# Patient Record
Sex: MALE | Race: WHITE | Employment: PART TIME | URBAN - METROPOLITAN AREA
[De-identification: names, ages, dates, MRNs, and addresses within clinical notes are randomized per-mention and may not be internally consistent; named-entity substitution may affect disease eponyms.]

---

## 2020-05-22 ENCOUNTER — HOSPITAL ENCOUNTER (EMERGENCY)
Age: 20
Discharge: HOME | End: 2020-05-23
Attending: EMERGENCY MEDICINE
Payer: COMMERCIAL

## 2020-05-22 ENCOUNTER — APPOINTMENT (OUTPATIENT)
Dept: GENERAL RADIOLOGY | Age: 20
End: 2020-05-22
Payer: COMMERCIAL

## 2020-05-22 PROCEDURE — 73502 X-RAY EXAM HIP UNI 2-3 VIEWS: CPT

## 2020-05-22 PROCEDURE — 73562 X-RAY EXAM OF KNEE 3: CPT

## 2020-05-22 PROCEDURE — 99283 EMERGENCY DEPT VISIT LOW MDM: CPT

## 2020-05-23 VITALS
RESPIRATION RATE: 16 BRPM | SYSTOLIC BLOOD PRESSURE: 101 MMHG | BODY MASS INDEX: 25.27 KG/M2 | HEART RATE: 80 BPM | DIASTOLIC BLOOD PRESSURE: 52 MMHG | WEIGHT: 180.5 LBS | TEMPERATURE: 98.7 F | HEIGHT: 71 IN | OXYGEN SATURATION: 96 %

## 2020-05-23 RX ORDER — METHOCARBAMOL 500 MG/1
500 TABLET, FILM COATED ORAL 4 TIMES DAILY
Qty: 40 TABLET | Refills: 0 | Status: SHIPPED | OUTPATIENT
Start: 2020-05-23 | End: 2020-06-02

## 2020-05-23 RX ORDER — MELOXICAM 7.5 MG/1
7.5 TABLET ORAL DAILY
Qty: 90 TABLET | Refills: 1 | Status: SHIPPED | OUTPATIENT
Start: 2020-05-23 | End: 2020-10-20 | Stop reason: ALTCHOICE

## 2020-05-23 NOTE — ED PROVIDER NOTES
of intermittent pain there is no obvious fracture or dislocation is no focal neurologic deficits, possible muscle skeletal irritation, if he has persistent symptoms he may require CT or MRI imaging. Outpatient follow-up orthopedics. With regard to his chronic abdominal pain this is been an ongoing issue since the age of 11 he was supposed to get a outpatient colonoscopy he will be followed up outpatient with GI and was given referral numbers. Muscle actions anti-inflammatories and is to return if he is worse or new symptoms    FINAL IMPRESSION    1. Hip pain left   2.   Chronic  abdominal pain       Collette Paschal, MD  00/06/54 7376

## 2020-05-23 NOTE — ED NOTES
Pt c/o left hip and knee pain that he has had for years but pain has been getting worse recently. States it is 8/10 with movement. Also c/o abd pain. Pt denies needs at this time. Pt placed cycling on monitor. VSS, see flowsheets. Dr. Laurel Gutierres at bedside. Will continue to monitor.           Nehemiah Connolly RN  05/22/20 7247

## 2020-05-28 ENCOUNTER — OFFICE VISIT (OUTPATIENT)
Dept: ORTHOPEDIC SURGERY | Age: 20
End: 2020-05-28
Payer: COMMERCIAL

## 2020-05-28 VITALS — BODY MASS INDEX: 25.2 KG/M2 | TEMPERATURE: 98.3 F | WEIGHT: 180 LBS | RESPIRATION RATE: 16 BRPM | HEIGHT: 71 IN

## 2020-05-28 PROBLEM — M54.32 LEFT SIDED SCIATICA: Status: ACTIVE | Noted: 2020-05-28

## 2020-05-28 PROCEDURE — G8427 DOCREV CUR MEDS BY ELIG CLIN: HCPCS | Performed by: ORTHOPAEDIC SURGERY

## 2020-05-28 PROCEDURE — G8419 CALC BMI OUT NRM PARAM NOF/U: HCPCS | Performed by: ORTHOPAEDIC SURGERY

## 2020-05-28 PROCEDURE — 99203 OFFICE O/P NEW LOW 30 MIN: CPT | Performed by: ORTHOPAEDIC SURGERY

## 2020-05-28 PROCEDURE — 4004F PT TOBACCO SCREEN RCVD TLK: CPT | Performed by: ORTHOPAEDIC SURGERY

## 2020-05-28 RX ORDER — METHYLPREDNISOLONE 4 MG/1
TABLET ORAL
Qty: 1 KIT | Refills: 0 | Status: SHIPPED | OUTPATIENT
Start: 2020-05-28 | End: 2020-08-03 | Stop reason: ALTCHOICE

## 2020-05-29 ENCOUNTER — TELEPHONE (OUTPATIENT)
Dept: ORTHOPEDIC SURGERY | Age: 20
End: 2020-05-29

## 2020-05-29 NOTE — TELEPHONE ENCOUNTER
Patient would like to clarify was he being referred to Neurology or Ortho for his spine. Please advise. [phone].  No referral in system to advise

## 2020-06-01 ENCOUNTER — OFFICE VISIT (OUTPATIENT)
Dept: ORTHOPEDIC SURGERY | Age: 20
End: 2020-06-01
Payer: COMMERCIAL

## 2020-06-01 VITALS — RESPIRATION RATE: 16 BRPM | HEIGHT: 71 IN | BODY MASS INDEX: 25.2 KG/M2 | TEMPERATURE: 98.1 F | WEIGHT: 180 LBS

## 2020-06-01 PROCEDURE — 4004F PT TOBACCO SCREEN RCVD TLK: CPT | Performed by: PHYSICIAN ASSISTANT

## 2020-06-01 PROCEDURE — G8427 DOCREV CUR MEDS BY ELIG CLIN: HCPCS | Performed by: PHYSICIAN ASSISTANT

## 2020-06-01 PROCEDURE — G8419 CALC BMI OUT NRM PARAM NOF/U: HCPCS | Performed by: PHYSICIAN ASSISTANT

## 2020-06-01 PROCEDURE — 99213 OFFICE O/P EST LOW 20 MIN: CPT | Performed by: PHYSICIAN ASSISTANT

## 2020-06-08 ENCOUNTER — HOSPITAL ENCOUNTER (OUTPATIENT)
Dept: PHYSICAL THERAPY | Age: 20
Setting detail: THERAPIES SERIES
Discharge: HOME | End: 2020-06-08
Payer: COMMERCIAL

## 2020-06-08 PROCEDURE — 97530 THERAPEUTIC ACTIVITIES: CPT

## 2020-06-08 PROCEDURE — 97110 THERAPEUTIC EXERCISES: CPT

## 2020-06-08 PROCEDURE — 97163 PT EVAL HIGH COMPLEX 45 MIN: CPT

## 2020-06-08 PROCEDURE — G0283 ELEC STIM OTHER THAN WOUND: HCPCS

## 2020-06-08 ASSESSMENT — PAIN SCALES - QUEBEC BACK PAIN DISABILITY SCALE
RIDE IN A CAR: 1
QUEBEC DISABILITY INDEX: 80-99%
WALK A FEW BLOCKS OR 300 TO 400M: 5
PULL OR PUSH HEAVY DOORS: 5
WALK SEVERAL KILOMETERS  OR MILES: 5
RUN ONE BLOCK OR 100M: 5
PUT ON SOCKS OR PANYHOSE: 2
MOVE A CHAIR: 3
TOTAL SCORE: 81
CARRY TWO BAGS OF GROCERIES: 5
REACH UP TO HIGH SHELVES: 5
TAKE FOOD OUT OF THE REFRIGERATOR: 2
SLEEP THROUGH THE NIGHT: 4
GET OUT OF BED: 2
QUEBEC CMS MODIFIER: CM
SIT IN A CHAIR FOR SEVERAL HOURS: 3
THROW A BALL: 5
LIFT AND CARRY A HEAVY SUITCASE: 5
CLIMB ONE FLIGHT OF STAIRS: 4
STAND UP FOR 20 TO 30 MINUTES: 5
TURN OVER IN BED: 5
MAKE YOUR BED: 5
BEND OVER TO CLEAN THE BATHTUB: 5

## 2020-06-08 NOTE — PLAN OF CARE
Outpatient Physical Therapy  [] [facility]    Phone: [phone]   Fax: [phone]   [x] [facility]  Phone: [phone]              Fax: [phone]  [] [facility]   Phone: [phone]   Fax: [phone]     To: Referring Practitioner: Briana Hutton      Patient: Jesu Schaefer   : 2000   MRN: [id number]  Evaluation Date: 2020      Diagnosis Information:  · Diagnosis: Left sided sciatica (M54.32 ICD-10-CM)   · Treatment Diagnosis: Lumbar sacral hypomobility/ mal alignment consistant with radiculopathy     Physical Therapy Certification/Re-Certification Form  Dear Briana Hutton  The following patient has been evaluated for physical therapy services and for therapy to continue, Medicare requires monthly physician review of the treatment plan. Please review the attached evaluation and/or summary of the patient's plan of care, and verify that you agree therapy should continue by signing the attached document and sending it back to our office. Plan of Care/Treatment to date:  [x] Therapeutic Exercise    [x] Modalities:  [x] Therapeutic Activity     [x] Ultrasound  [x] Electrical Stimulation  [] Gait Training      [] Cervical Traction [x] Lumbar Traction  [x] Neuromuscular Re-education    [x] Cold/hotpack [] Iontophoresis   [x] Instruction in HEP     Other:  [x] Manual Therapy      []             [] Aquatic Therapy      []           ? Frequency/Duration:  # Days per week: [] 1 day # Weeks: [] 1 week [] 5 weeks     [x] 2 days? [] 2 weeks [x] 6 weeks     [] 3 days   [] 3 weeks [] 7 weeks     [] 4 days   [] 4 weeks [x] 8 weeks    Rehab Potential: [] Excellent [x] Good [x] Fair  [] Poor       Electronically signed by:  Yoana Lemus PT      If you have any questions or concerns, please don't hesitate to call.   Thank you for your referral.      Physician Signature:________________________________Date:__________________  By signing above, therapists plan is approved by physician

## 2020-06-08 NOTE — PROGRESS NOTES
Physical Therapy  Initial Assessment  Date: 2020  Patient Name: Raquel Danielson  MRN: [id number]  : 2000     Treatment Diagnosis: Lumbar sacral hypomobility/ mal alignment consistent with radiculopathy    Restrictions  Position Activity Restriction  Other position/activity restrictions: not a fall risk    Subjective   General  Chart Reviewed:  Yes  Additional Pertinent Hx: h/o back pain  Referring Practitioner: Griselda Ink  Referral Date : 20  Diagnosis: Left sided sciatica (M54.32 ICD-10-CM)  General Comment  Comments: Pt fell at home while self medicating, landing on knees and then fell back, 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, activities include LARP, combat training  PT Visit Information  Onset Date: 20  PT Insurance Information: [employer] 34 PLAN  Subjective  Subjective: c/o constant LBP 3-9/10 that radiates from back to ankle but also has parethesia in his left foot, increased pain with standing, transfering from sit to stand, general movement, decreased pain with lying on right side, sleep is disturbed by pain, pain has gotten worse from onset,        Vision/Hearing       Orientation  Orientation  Overall Orientation Status: Within Normal Limits    Social/Functional History       Objective     Observation/Palpation  Posture: Poor  Palpation: TTP at Bilat dorssal LS pvm's Left > Right   Observation:  flexed at lumbar and laterally shifted to right   Body Mechanics: Gait is antalgic decreased step length, pace and slight decrease in LLE stance phase    AROM RLE (degrees)  RLE AROM: WFL  AROM LLE (degrees)  LLE AROM : WFL  Spine  Lumbar: Flexion 15-55* (* increased LS pain), Ext *-15 from neutral, lateral flexion Left 0-10* and right 0-15, rotation Left and right each WFL's    Strength RLE  Strength RLE: WFL  Strength LLE  L Hip Flexion: 4+/5  L Knee Flexion: 4+/5  L Knee Extension: 4+/5  L Ankle Dorsiflexion: 4/5  L Ankle

## 2020-06-08 NOTE — FLOWSHEET NOTE
lumbar support 6/8/20   standing     lifting               Therapeutic Activities; 6/8/20 Neutral Spine (NS) Instruction. The patient demonstrated good tolerance, technique  and verbalized understanding with and of NS instruction respectively. [facility] spine posture hand out issued. Manual Treatments:   Right lateral shift correction (tolerated well)  Left Sciatic N. Glides clementina fair to well   Man. PTx clementina. Poor (LLE symptoms)    Modalities:   IFC prone on mob table 15 min    Progression Towards Functional goals:  [] Patient is progressing as expected towards functional goals listed. [] Progression is slowed due to complexities listed. [] Progression has been slowed due to co-morbidities. [x] Plan just implemented, too soon to assess goals progression  [] Other:    Charges: Therapeutic Exercise:  [] (41334) Provided verbal/tactile cueing for activities to restore or maintain strength, flexibility, endurance, ROM for improvements with self-care, mobility, lifting and ambulation. Neuromuscular Re-Education  [] (72626) Provided verbal/tactile cueing for activities to restore or maintain balance, coordination, kinesthetic sense, posture, motor skill, proprioception for self-care, mobility, lifting, and ambulation. Therapeutic Activities:    [] (56018) Provided verbal/tactile cueing to address functional limitations related to loss of mobility, strength, balance, and coordination.      Gait Training:  [] (91245) Provided training and instruction to the patient for proper postural muscle recruitment and positioning with ambulation re-education     Home Exercise Program:    [] (33028) Reviewed/Progressed HEP activities related to strengthening, flexibility, endurance, ROM for functional self-care, mobility, lifting and ambulation   [] (26003) Reviewed/Progressed HEP activities related to improving balance, coordination, kinesthetic sense, posture, motor skill, proprioception for self-care, mobility, lifting, and ambulation      Manual Treatments:  MFR / STM / Oscillations-Mobs:  G-I, II, III, IV / Manipulation / MLD  [] (78460) Provided manual therapy to mobilize  soft tissue/joints/fluid for the purpose of modulating pain, promoting relaxation, increasing ROM, reducing/eliminating soft tissue swelling/inflammation/restriction, improving soft tissue extensibility and allowing for proper ROM for normal function with self- care, mobility, lifting and ambulation. Timed Code Treatment Minutes: 25   Total Treatment Minutes: 70     [] EVAL (LOW) 32771   [] EVAL (MOD) 96844   [x] EVAL (HIGH) 20048   [] RE-EVAL   [x] TE (18010) x     [] Aquatic (57464) x  [] NMR (48270)   x  [] Aquatic Group (84349) x  [] Manual (57704) x    [] Ultrasound (12388) x  [x] TA (92201) x  [] Mech Traction (24369)  [] Ionto (78377)           [x] ES (un) (08223):   [] Vasopump (81902) [] Other:      Assessment  [] Patient tolerated treatment well [] Patient limited by fatigue  [] Patient limited by pain  [] Patient limited by other medical complications  [x] Other: Javier.  Rx fair to well on mob table with flexion    Prognosis: [x] Good [x] Fair  [] Poor    Goals:    Short term goals  Time Frame for Short term goals: 2-3 weeks  Short term goal 1: Pt will stand upright without increased c/o pain to facilitate pt's goal      Long term goals  Time Frame for Long term goals : 6-8 weeks  Long term goal 1: All TLS spine ranges wnl's without increased c/o pain  Long term goal 2: Decrease c/o pain at LLE and Back 80% or better with standing, walking and transfers  Long term goal 3: improve LLE strength  1 step of a MMT grade to facilitate greater ease of mobility     Patient Requires Follow-up: [x] Yes  [] No    Plan:   [x] Continue per plan of care [] Alter current plan (see comments)  [] Plan of care initiated [] Hold pending MD visit [] Discharge    Plan for Next Session:  E stim on flex mob table, consider US,  postural correction, core

## 2020-06-18 ENCOUNTER — HOSPITAL ENCOUNTER (OUTPATIENT)
Dept: PHYSICAL THERAPY | Age: 20
Setting detail: THERAPIES SERIES
Discharge: HOME | End: 2020-06-18
Payer: COMMERCIAL

## 2020-06-18 PROCEDURE — G0283 ELEC STIM OTHER THAN WOUND: HCPCS

## 2020-06-18 PROCEDURE — 97035 APP MDLTY 1+ULTRASOUND EA 15: CPT

## 2020-06-18 PROCEDURE — 97110 THERAPEUTIC EXERCISES: CPT

## 2020-06-18 NOTE — FLOWSHEET NOTE
rotation L/R 20 x ea    Piriformis stretch 30\" x 4 L/R ea    SKTC 30\" 4 x L/R ea              Neutral Spine (NS) Activities     Bed mobility Seated <> SL'ing and log rolling 6/8/20   Seated  Pillow lumbar support 6/8/20   standing     lifting               Therapeutic Activities; 6/8/20 Neutral Spine (NS) Instruction. The patient demonstrated good tolerance, technique  and verbalized understanding with and of NS instruction respectively. [employer] spine posture hand out issued. 6/18/20 Reviewed NS trunk rolling R anD SL'ing <> sit. Pt demonstrated good tolerance and tech. Access Code: [email or web address]   URL: [email or web address]   Date: 06/18/2020   Prepared by: Neena Feliciano     Exercises   Supine Posterior Pelvic Tilt - 15 reps - 1-2 sets - 5 hold - 2-3x daily - 7x weekly   Hooklying Single Knee to Chest Stretch - 4 reps - 1 sets - 30 hold - 2-3x daily - 7x weekly   Sidelying Lumbar Rotation - 20 reps - 1-3 sets - 1-2 hold - 2-3x daily - 7x weekly   Supine Piriformis Stretch with Foot on Ground - 2-4 reps - 1 sets - 30 hold - 2-3x daily - 7x weekly     The patient demonstrated good tolerance to and understanding of the HEP. Written instructions have been issued. Manual Treatments:   Right lateral shift correction (tolerated well)  Left Sciatic N. Glides clementina fair to well   Man. PTx clementina. Poor (LLE symptoms)    Modalities:   IFC prone on mob table 15 min    Progression Towards Functional goals:  [] Patient is progressing as expected towards functional goals listed. [] Progression is slowed due to complexities listed. [] Progression has been slowed due to co-morbidities. [x] Plan just implemented, too soon to assess goals progression  [] Other:    Charges: Therapeutic Exercise:  [] (42473) Provided verbal/tactile cueing for activities to restore or maintain strength, flexibility, endurance, ROM for improvements with self-care, mobility, lifting and ambulation.     Neuromuscular Re-Education  [] (70595) Provided verbal/tactile cueing for activities to restore or maintain balance, coordination, kinesthetic sense, posture, motor skill, proprioception for self-care, mobility, lifting, and ambulation. Therapeutic Activities:    [] (67342) Provided verbal/tactile cueing to address functional limitations related to loss of mobility, strength, balance, and coordination. Gait Training:  [] (08092) Provided training and instruction to the patient for proper postural muscle recruitment and positioning with ambulation re-education     Home Exercise Program:    [] (09862) Reviewed/Progressed HEP activities related to strengthening, flexibility, endurance, ROM for functional self-care, mobility, lifting and ambulation   [] (74608) Reviewed/Progressed HEP activities related to improving balance, coordination, kinesthetic sense, posture, motor skill, proprioception for self-care, mobility, lifting, and ambulation      Manual Treatments:  MFR / STM / Oscillations-Mobs:  G-I, II, III, IV / Manipulation / MLD  [] (14028) Provided manual therapy to mobilize  soft tissue/joints/fluid for the purpose of modulating pain, promoting relaxation, increasing ROM, reducing/eliminating soft tissue swelling/inflammation/restriction, improving soft tissue extensibility and allowing for proper ROM for normal function with self- care, mobility, lifting and ambulation.         Timed Code Treatment Minutes: 45   Total Treatment Minutes: 65     [] EVAL (LOW) 43146   [] EVAL (MOD) 03706   [] EVAL (HIGH) 90682   [] RE-EVAL   [x] TE (83577) x2     [] Aquatic (72647) x  [] NMR (85802)   x  [] Aquatic Group (21071) x  [] Manual (16014) x    [x] Ultrasound (01099) x  [] TA (17763) x  [] Mech Traction (41780)  [] Ionto (94258)           [x] ES (un) (09463):   [] Vasopump (85604) [] Other:      Assessment  [] Patient tolerated treatment well [] Patient limited by fatigue  [] Patient limited by pain  [] Patient limited by other medical complications  [x] Other: Javier.  Rx fair to well on mob table with flexion    Prognosis: [x] Good [x] Fair  [] Poor    Goals:    Short term goals  Time Frame for Short term goals: 2-3 weeks  Short term goal 1: Pt will stand upright without increased c/o pain to facilitate pt's goal      Long term goals  Time Frame for Long term goals : 6-8 weeks  Long term goal 1: All TLS spine ranges wnl's without increased c/o pain  Long term goal 2: Decrease c/o pain at LLE and Back 80% or better with standing, walking and transfers  Long term goal 3: improve LLE strength  1 step of a MMT grade to facilitate greater ease of mobility     Patient Requires Follow-up: [x] Yes  [] No    Plan:   [x] Continue per plan of care [] Alter current plan (see comments)  [] Plan of care initiated [] Hold pending MD visit [] Discharge    Plan for Next Session:    E stim on flex mob table, consider US,  postural correction, core stabilization progress with Stevie Cranker ex if tolerated otherwise ( Lamine flexion)    Electronically signed by:  Rayshawn Javed, PT

## 2020-06-22 ENCOUNTER — HOSPITAL ENCOUNTER (OUTPATIENT)
Dept: PHYSICAL THERAPY | Age: 20
Setting detail: THERAPIES SERIES
Discharge: HOME | End: 2020-06-22
Payer: COMMERCIAL

## 2020-06-22 PROCEDURE — G0283 ELEC STIM OTHER THAN WOUND: HCPCS

## 2020-06-22 PROCEDURE — 97140 MANUAL THERAPY 1/> REGIONS: CPT

## 2020-06-22 PROCEDURE — 97110 THERAPEUTIC EXERCISES: CPT

## 2020-06-22 PROCEDURE — 97035 APP MDLTY 1+ULTRASOUND EA 15: CPT

## 2020-06-22 NOTE — FLOWSHEET NOTE
gluteal sets 5\" x 10 Clementina. Fair to poor   LTR mid rom        Piriformis stretch 30\" x 4 L/R ea    SKTC 30\" 4 x L/R ea              Neutral Spine (NS) Activities     Bed mobility Seated <> SL'ing and log rolling 6/8/20   Seated  Pillow lumbar support 6/8/20   standing     lifting               Therapeutic Activities; 6/8/20 Neutral Spine (NS) Instruction. The patient demonstrated good tolerance, technique  and verbalized understanding with and of NS instruction respectively. [facility] spine posture hand out issued. 6/18/20 Reviewed NS trunk rolling R anD SL'ing <> sit. Pt demonstrated good tolerance and tech. Access Code: [email or web address]   URL: [email or web address]   Date: 06/18/2020   Prepared by: Vic Coughlin     Exercises   Supine Posterior Pelvic Tilt - 15 reps - 1-2 sets - 5 hold - 2-3x daily - 7x weekly   Hooklying Single Knee to Chest Stretch - 4 reps - 1 sets - 30 hold - 2-3x daily - 7x weekly   Sidelying Lumbar Rotation - 20 reps - 1-3 sets - 1-2 hold - 2-3x daily - 7x weekly   Supine Piriformis Stretch with Foot on Ground - 2-4 reps - 1 sets - 30 hold - 2-3x daily - 7x weekly     The patient demonstrated good tolerance to and understanding of the HEP. Written instructions have been issued. Manual Treatments:   Right lateral shift correction (tolerated well)  Left Sciatic N. Glides clementina fair to well   Man. PTx clementina. Poor (LLE symptoms)  Right upslip gentle right leg pull, mfr to right quadratus which caused pain in left lb, (he has a left on right post sacral torsion but did not want to put him in position of correction )    Modalities:   ifc and hp supine with bolster x 15 min, us 1.5 w/cm2 to left L45S1 area x 10 min    Progression Towards Functional goals:  [] Patient is progressing as expected towards functional goals listed. [] Progression is slowed due to complexities listed. [] Progression has been slowed due to co-morbidities.   [x] Plan just implemented, too soon to assess goals

## 2020-06-29 ENCOUNTER — HOSPITAL ENCOUNTER (OUTPATIENT)
Dept: PHYSICAL THERAPY | Age: 20
Setting detail: THERAPIES SERIES
Discharge: HOME | End: 2020-06-29
Payer: COMMERCIAL

## 2020-06-29 PROCEDURE — 97035 APP MDLTY 1+ULTRASOUND EA 15: CPT

## 2020-06-29 PROCEDURE — G0283 ELEC STIM OTHER THAN WOUND: HCPCS

## 2020-06-29 PROCEDURE — 97140 MANUAL THERAPY 1/> REGIONS: CPT

## 2020-06-29 ASSESSMENT — PAIN SCALES - QUEBEC BACK PAIN DISABILITY SCALE
THROW A BALL: 5
TOTAL SCORE: 81
MAKE YOUR BED: 5
WALK A FEW BLOCKS OR 300 TO 400M: 5
BEND OVER TO CLEAN THE BATHTUB: 5
SLEEP THROUGH THE NIGHT: 4
PUT ON SOCKS OR PANYHOSE: 2
STAND UP FOR 20 TO 30 MINUTES: 5
QUEBEC CMS MODIFIER: CM
TAKE FOOD OUT OF THE REFRIGERATOR: 2
GET OUT OF BED: 2
RIDE IN A CAR: 1
QUEBEC DISABILITY INDEX: 80-99%
SIT IN A CHAIR FOR SEVERAL HOURS: 3
CARRY TWO BAGS OF GROCERIES: 5
REACH UP TO HIGH SHELVES: 5
TURN OVER IN BED: 5
RUN ONE BLOCK OR 100M: 5
CLIMB ONE FLIGHT OF STAIRS: 4
PULL OR PUSH HEAVY DOORS: 5
LIFT AND CARRY A HEAVY SUITCASE: 5
WALK SEVERAL KILOMETERS  OR MILES: 5
MOVE A CHAIR: 3

## 2020-06-29 NOTE — PROGRESS NOTES
should continue to improve in reasonable time if they continue HEP              []Patient has plateaued and is no longer responding to skilled PT intervention                        [x]Patient is getting worse and would benefit from return to referring MD              []Patient unable to adhere to initial POC              []Other:     Goals: Short term goals  Time Frame for Short term goals: 2-3 weeks  Short term goal 1: Pt will stand upright without increased c/o pain to facilitate pt's goal      Long term goals  Time Frame for Long term goals : 6-8 weeks  Long term goal 1: All TLS spine ranges wnl's without increased c/o pain  Long term goal 2: Decrease c/o pain at LLE and Back 80% or better with standing, walking and transfers  Long term goal 3: improve LLE strength  1 step of a MMT grade to facilitate greater ease of mobility     Goal Status:  [] Achieved [] Partially Achieved  [x] Not Achieved     Reason for Discharge:  [] Goals Met   [] Patient did not return after initial evaluation   [] Progress Plateaued [] Missed _____ scheduled appointments   [] No insurance coverage [x] Patient terminated therapy   [] Other:        PT recommendation:   [x] Patient now discharged with HEP      [] Other:    Discharge discussed with:  [x] Patient  [] Caregiver       [] Other        Electronically signed by:  Praneeth Gaspar PT    If you have any questions or concerns, please don't hesitate to call.   Thank you for your referral.

## 2020-06-29 NOTE — FLOWSHEET NOTE
hp supine with bolster x 15 min, us 1.5 w/cm2 to left L45S1 area x 8 min    Progression Towards Functional goals:  [] Patient is progressing as expected towards functional goals listed. [] Progression is slowed due to complexities listed. [] Progression has been slowed due to co-morbidities. [x] Plan just implemented, too soon to assess goals progression  [] Other:    Charges: Therapeutic Exercise:  [] (24127) Provided verbal/tactile cueing for activities to restore or maintain strength, flexibility, endurance, ROM for improvements with self-care, mobility, lifting and ambulation. Neuromuscular Re-Education  [] (93206) Provided verbal/tactile cueing for activities to restore or maintain balance, coordination, kinesthetic sense, posture, motor skill, proprioception for self-care, mobility, lifting, and ambulation. Therapeutic Activities:    [] (39779) Provided verbal/tactile cueing to address functional limitations related to loss of mobility, strength, balance, and coordination.      Gait Training:  [] (84142) Provided training and instruction to the patient for proper postural muscle recruitment and positioning with ambulation re-education     Home Exercise Program:    [] (59962) Reviewed/Progressed HEP activities related to strengthening, flexibility, endurance, ROM for functional self-care, mobility, lifting and ambulation   [] (85850) Reviewed/Progressed HEP activities related to improving balance, coordination, kinesthetic sense, posture, motor skill, proprioception for self-care, mobility, lifting, and ambulation      Manual Treatments:  MFR / STM / Oscillations-Mobs:  G-I, II, III, IV / Manipulation / MLD  [] (78095) Provided manual therapy to mobilize  soft tissue/joints/fluid for the purpose of modulating pain, promoting relaxation, increasing ROM, reducing/eliminating soft tissue swelling/inflammation/restriction, improving soft tissue extensibility and allowing for proper ROM for normal function with self- care, mobility, lifting and ambulation. Timed Code Treatment Minutes: 45   Total Treatment Minutes: 65     [] EVAL (LOW) 94215   [] EVAL (MOD) 37589   [] EVAL (HIGH) 02804   [] RE-EVAL   [] TE (85604) x     [] Aquatic (98432) x  [] NMR (97988)   x  [] Aquatic Group (72631) x  [x] Manual (17719) x 2  [x] Ultrasound (94057) x  [] TA (77034) x  [] Mech Traction (50147)  [] Ionto (91385)           [x] ES (un) (04626):   [] Vasopump (33228) [] Other:      Assessment  [] Patient tolerated treatment well [] Patient limited by fatigue  [] Patient limited by pain  [] Patient limited by other medical complications  [x] Other: Javier.  Rx fair to well on mob table with flexion    Prognosis: [x] Good [x] Fair  [] Poor    Goals:    Short term goals  Time Frame for Short term goals: 2-3 weeks  Short term goal 1: Pt will stand upright without increased c/o pain to facilitate pt's goal      Long term goals  Time Frame for Long term goals : 6-8 weeks  Long term goal 1: All TLS spine ranges wnl's without increased c/o pain  Long term goal 2: Decrease c/o pain at LLE and Back 80% or better with standing, walking and transfers  Long term goal 3: improve LLE strength  1 step of a MMT grade to facilitate greater ease of mobility     Patient Requires Follow-up: [x] Yes  [x] No    Plan:   [] Continue per plan of care [] Alter current plan (see comments)  [] Plan of care initiated [] Hold pending MD visit [x] Discharge    Plan for Next Session:        Electronically signed by:  Samira Noel PT

## 2020-06-30 ENCOUNTER — TELEPHONE (OUTPATIENT)
Dept: ORTHOPEDIC SURGERY | Age: 20
End: 2020-06-30

## 2020-06-30 NOTE — TELEPHONE ENCOUNTER
----- Message from Isaac Lozada PA-C sent at 6/30/2020  8:58 AM EDT -----  Received note from PT that his pain is worsening and unable to complete treatment. I would suggest calling patient to order lumbar MRI. Thanks.

## 2020-06-30 NOTE — TELEPHONE ENCOUNTER
Attempted to call patient to see about getting an Mri ordered. However number was not accepting calls at this time. I will try again later.

## 2020-07-01 NOTE — TELEPHONE ENCOUNTER
Attempted to call patient to see about getting an MRI ordered. However, patients number is not available for incoming calls.  Will try again later

## 2020-07-02 NOTE — TELEPHONE ENCOUNTER
Attempted to call patient find out where he wanted the MRI to be placed at. However the primary number isnt in service and the secondary number is incorrect. Patient is scheduled for a follow up visit with Everette Hawkins on 7.8.2020. I will try him again next week before his appt. If not then this can be discussed and placed then.

## 2020-07-08 ENCOUNTER — OFFICE VISIT (OUTPATIENT)
Dept: ORTHOPEDIC SURGERY | Age: 20
End: 2020-07-08
Payer: COMMERCIAL

## 2020-07-08 VITALS — HEIGHT: 71 IN | BODY MASS INDEX: 25.2 KG/M2 | WEIGHT: 180 LBS | TEMPERATURE: 98.4 F

## 2020-07-08 PROCEDURE — G8419 CALC BMI OUT NRM PARAM NOF/U: HCPCS | Performed by: PHYSICIAN ASSISTANT

## 2020-07-08 PROCEDURE — 99213 OFFICE O/P EST LOW 20 MIN: CPT | Performed by: PHYSICIAN ASSISTANT

## 2020-07-08 PROCEDURE — 4004F PT TOBACCO SCREEN RCVD TLK: CPT | Performed by: PHYSICIAN ASSISTANT

## 2020-07-08 PROCEDURE — G8427 DOCREV CUR MEDS BY ELIG CLIN: HCPCS | Performed by: PHYSICIAN ASSISTANT

## 2020-07-08 RX ORDER — CYCLOBENZAPRINE HCL 5 MG
5 TABLET ORAL 3 TIMES DAILY PRN
Qty: 30 TABLET | Refills: 0 | Status: SHIPPED | OUTPATIENT
Start: 2020-07-08 | End: 2020-07-18

## 2020-07-08 NOTE — PROGRESS NOTES
History of present illness:   Mr. Greg Sheppard  is a pleasant 23 y.o. male with a PMH of anxiety returning to the office regarding his LBP and left leg pain. He states his pain began after a fall about 2 months ago. Patient went to [facility] ED on 5/22/20. His pain has increased since last office visit. He rates his pain 9/10 today. He describes the pain as shooting, sharp, and constant that is worse with any movement and better with rest. The leg pain radiates down the lateral aspect of his left leg to his foot. He admits numbness and tingling in his left leg. He denies progressive weakness of his left leg and denies bowel or bladder dysfunction. His left hip was evaluated by Dr. Tejas Jorgensen and found no significant arthritis or hip pathology. He has tried medrol dose pack with some relief. Has been doing PT which initially helped but now feels his pain is worse. Physical examination:  Mr. Indio Cordoba most recent vitals: There is no height or weight on file to calculate BMI. General exam:  He is well-developed and well-nourished, is in obvious pain and alert and oriented to person, place, and time. He demonstrates appropriate mood and affect. His skin is warm and dry. His gait is normal and he walks heel to toe without limp or instability. Back:  He stands and walks with moderate lumbar flexion. His lumbar flexion, extension and lateral bending are moderately reduced with pain. He has moderate tenderness over his lumbar spine without obvious muscle spasm. Tenderness over left lumbar paraspinal muscles. The skin over his lumbar spine is normal without a surgical scar. Lower extremities:  He has 5/5 motor strength of bilateral lower extremities. He has a negative straight leg raise, bilaterally. Deep tendon reflexes at knees and achilles are 2+. Sensation is intact to light touch L3 to S1 bilaterally. He has no clonus. Hip range of motion painless.     Imaging:  I reviewed AP and lateral xray images of his lumbar spine from last office visit. Patient is in forward flexed position during xrays. No evidence of fracture or instability. Spondylosis most notable L4-5 and L5-S1    Assessment:  Lumbar spondylosis with radiculopathy    Plan:  We discussed treatment options including observation, additional oral steroids, physical therapy, epidural injection and additional imaging. He has not improved with outpatient PT therefore we will order lumbar MRI without contrast. We will call him with results. May try GILMA pending results.     Judy Rojas PA-C

## 2020-07-10 ENCOUNTER — TELEPHONE (OUTPATIENT)
Dept: ORTHOPEDIC SURGERY | Age: 20
End: 2020-07-10

## 2020-07-14 ENCOUNTER — HOSPITAL ENCOUNTER (OUTPATIENT)
Dept: MRI IMAGING | Age: 20
Discharge: HOME | End: 2020-07-14
Payer: COMMERCIAL

## 2020-07-14 PROCEDURE — 72148 MRI LUMBAR SPINE W/O DYE: CPT

## 2020-07-16 ENCOUNTER — TELEPHONE (OUTPATIENT)
Dept: ORTHOPEDIC SURGERY | Age: 20
End: 2020-07-16

## 2020-07-16 NOTE — TELEPHONE ENCOUNTER
Spoke with patient and went over his MRI results. Patient would like to proceed with an GILMA. A referral was placed to Dr. Carol Smith as he wanted someone who went to the [facility] side [facility].

## 2020-07-16 NOTE — TELEPHONE ENCOUNTER
----- Message from Marshal Hernandez PA-C sent at 7/15/2020 12:32 PM EDT -----  Please call patient with lumbar MRI results. They show left sided disc protrusion at L5-S1 which would explain his symptoms. He is welcome to try GILMA as discussed in office. Thanks.

## 2020-07-20 ENCOUNTER — TELEPHONE (OUTPATIENT)
Dept: ORTHOPEDIC SURGERY | Age: 20
End: 2020-07-20

## 2020-07-20 NOTE — TELEPHONE ENCOUNTER
Patient was referred to Dr. Manda Hammond for lumbar  MRI yes  PT reported in referring physician office note  MEDICAL HX negative  Patient is  approved to be seen at this time. Message sent to schedulers    Please notify referring physician if denied.

## 2020-08-03 ENCOUNTER — OFFICE VISIT (OUTPATIENT)
Dept: ORTHOPEDIC SURGERY | Age: 20
End: 2020-08-03
Payer: COMMERCIAL

## 2020-08-03 VITALS — RESPIRATION RATE: 12 BRPM | HEIGHT: 71 IN | BODY MASS INDEX: 25.19 KG/M2 | TEMPERATURE: 98.2 F | WEIGHT: 179.9 LBS

## 2020-08-03 PROCEDURE — 99244 OFF/OP CNSLTJ NEW/EST MOD 40: CPT | Performed by: PHYSICIAN ASSISTANT

## 2020-08-03 PROCEDURE — G8427 DOCREV CUR MEDS BY ELIG CLIN: HCPCS | Performed by: PHYSICIAN ASSISTANT

## 2020-08-03 PROCEDURE — G8419 CALC BMI OUT NRM PARAM NOF/U: HCPCS | Performed by: PHYSICIAN ASSISTANT

## 2020-08-03 NOTE — LETTER
Please schedule the following with:     Date:   20 @ 8:30    Account: [id number]  Patient: Beverley Alexander    : 2000  Address:  [address]    Phone (F):  [phone] (home)      ----------------------------------------------------------------------------------------------  Diagnosis:     ICD-10-CM    1. Lumbar radiculopathy  M54.16    2. HNP (herniated nucleus pulposus), lumbar  M51.26    3. DDD (degenerative disc disease), lumbar  M51.36      Procedure: Transforaminal epidural steroid injection     Levels:L5 and S1  Side: Left  CPT Codes 14485, 20366    ----------------------------------------------------------------------------------------------  Injection # 1  [facility]    Attending Physician       James Cordoba.  Anne Marie Archer MD.    ----------------------------------------------------------------------------------------------  Injection Scheduled For:    At:    1st Insurance [employer]  Pre-Cert#    2nd Insurance     Pre-Cert#    Comments:    · Infection control  · Tested positive for MRSA in past 12 months:  no  · Tested positive for MSSA \"staph infection\" in past 12 months: no  · Tested positive for VRE (Vancomycin Resistant Enterococci) in past 12 months:   no  · Currently on any antibiotics for an infection: no  · Anticoagulants:  · On a blood thinner:  no   · Any history of bleeding disorder: no   · Advanced Liver disease: no   · Advanced Renal disease: no   · Glaucoma: no   · Diabetes: no     Sedation:  Yes  -----------------------------------------------------------------------------------------------  No Known Allergies

## 2020-08-03 NOTE — PROGRESS NOTES
New Patient: SPINE    Referring Provider:  Elana Aguilar PA-C    Chief Complaint   Patient presents with    Lower Back Pain     NP LSP     Leg Pain     NP L LEG       HISTORY OF PRESENT ILLNESS:      · The patient is being sent at the request of Elana Aguilar PA-C in consultation as a new spine patient for low back pain and left leg pain. The patient is a 23 y.o. male whom reports symptoms for 3 months. Symptoms progressed over the last  3 months. Patient reports there was not a significant event to cause the symptoms, however he did have a fall in May that could have started these symptoms. Today discomfort is report at 8 out of 10, describing it as numbness, stabbing. Symptoms are aggravated by: changing positions. Patient has undergone recent treatment including, oral medications, activity modifications, physical therapy, heat, ice, TENS unit. Patient denies previous lumbar spine surgery. · Mr. Everardo Huerta presents today for low back pain with radicular symptoms in the entire left leg, into the foot. He describes his pain as constant in nature. He sustained a fall a few months ago, however this is not when his pain started. The symptoms have been getting progressively worse. There is more stabbing pain in the low back and numbness, shooting, electric pain in the left leg. His low back pain is worse with extension. The pain occasionally wakes him up at night. · Flexeril and Ibuprofen help him the most. Use of a TENS unit also helps, however he feels a significant plateau with his pain after attending physical therapy. He notes weakness and instability in his left leg. He denies falling due to this symptom. Patient does not present today with any ambulatory aids or bracing.      Pain Assessment  Location of Pain: Back(LSP)  Location Modifiers: Left(LEG)  Severity of Pain: 8  Quality of Pain: (NUMBNESS, STABBING)  Duration of Pain: Persistent  Frequency of Pain: Constant  Date Pain First Started: (3 cigarettes. He has been smoking about 0.50 packs per day. His smokeless tobacco use includes chew. He reports current alcohol use. He reports previous drug use. Family History:   Family History   Problem Relation Age of Onset    No Known Problems Mother     No Known Problems Father          REVIEW OF SYSTEMS: ROS - 14 point    Constitutional: No fevers, chills, night sweats, unexplained weight loss  Eye: No vision changes or diplopia  ENT: No nasal congestion, postnasal drip or sore throat. No tinnitus  Respiratory: No cough or SOB  CV: No chest pain or palpitations  GI: No nausea, abdominal pain, stool changes  : No dysuria or hematuria  Skin: No new or changing skin lesions, no rashes  MSK: No joint swelling, morning stiffness, unusual joint pain, + low back and left leg pain  Neurological: No headache, confusion, syncope  Psychiatric: No excessive anxiety or depression  Endocrine: No polyuria or polydipsia  Hematologic: No lymph node enlargement or excessive bleeding  Immunologic:No history of immune deficiency or immunomodulating drugs           PHYSICAL EXAM:    Vitals: Temperature 98.2 °F (36.8 °C), resp. rate 12, height 5' 10.98\" (1.803 m), weight 179 lb 14.3 oz (81.6 kg). GENERAL EXAM:  · General Apparence: Patient is adequately groomed with no evidence of malnutrition. · Psychiatric: Orientation: The patient is oriented to time, place and person. The patient's mood and affect are appropriate   · Vascular: Examination reveals no swelling and palpation reveals no tenderness in upper or lower extremities. Good capillary refill. · The lymphatic examination of the neck, axillae and groin reveals all areas to be without enlargement or induration   Sensation is intact without deficit in the upper  extremities to light touch and pinprick. Sensation is decreased to light touch and pinprick in the left lower extremity compared to right.    · Coordination of the upper and lower extremities are normal.    CERVICAL EXAMINATION:  · Inspection: Local inspection shows no step-off or bruising. Cervical alignment is normal. No instability is noted. · Palpation and Percussion: No evidence of tenderness at the midline. Paraspinal tenderness is not present. There is no paraspinal spasm. · Range of Motion:  pain-free ROM   · Strength: 5/5 bilateral upper extremities  · Special Tests:   Spurling's and Jameson's are negative bilaterally. Kiran and Impingement tests are negative bilaterally. · Skin:There are no rashes, ulcerations or lesions. · Reflexes: Bilaterally triceps, biceps and brachioradialis are 2+. Clonus absent bilaterally at the feet. No pathological reflexes are noted. · Gait & station:  antalgic on the left and no ataxia  · Additional Examinations:  · RIGHT UPPER EXTREMITY:  Inspection/examination of the right upper extremity does not show any tenderness, deformity or injury. Range of motion is normal and pain-free. There is no gross instability. There are no rashes, ulcerations or lesions. Strength and tone are normal. No atrophy or abnormal movements are noted. · LEFT UPPER EXTREMITY: Inspection/examination of the left upper extremity does not show any tenderness, deformity or injury. Range of motion is normal and pain-free. There is no gross instability. There are no rashes, ulcerations or lesions. Strength and tone are normal. No atrophy or abnormal movements are noted. LUMBAR/SACRAL EXAMINATION:  · Inspection: Local inspection shows no step-off or bruising. Lumbar alignment is normal. No instability is noted. · Palpation:   No evidence of tenderness at the midline. Lumbar paraspinal tenderness Mild L4/5 and L5/S1 tenderness  Bursal tenderness No tenderness bilaterally  There is no paraspinal spasm. · Range of Motion: very limited due to pain  · Strength:   Strength testing is 5/5 in all muscle groups tested.   · Special Tests:   Straight leg raise positive on the left, negative on the right and crossed SLR positive on the right. Gordon's testing is negative bilaterally. FADIR's testing is negative bilaterally. Slump test negative. Bowstring test negative  · Skin: There are no rashes, ulcerations or lesions. · Reflexes: Reflexes are symmetrically 2+ at the patellar and ankle tendons. Clonus absent bilaterally at the feet. · Gait & station: antalgic on the left and no ataxia  · Additional Examinations:  · RIGHT LOWER EXTREMITY: Inspection/examination of the right lower extremity does not show any tenderness, deformity or injury. Range of motion is unremarkable. There is no gross instability. There are no rashes, ulcerations or lesions. Strength and tone are normal. No atrophy or abnormal movements are noted. · LEFT LOWER EXTREMITY:  Inspection/examination of the left lower extremity does not show any tenderness, deformity or injury. Range of motion is unremarkable. There is no gross instability. There are no rashes, ulcerations or lesions. Strength and tone are normal. No atrophy or abnormal movements are noted. Diagnostic Testing:    Xrays:   I personally reviewed images of the lumbar films taken 6/1/2020.     MRI or CT: MRI of the Lumbar Spine from 7/14/2020 :   L1-L2: There is no significant disc herniation, spinal canal stenosis or    neural foraminal narrowing.         L2-L3: There is no significant disc herniation, spinal canal stenosis or    neural foraminal narrowing.         L3-L4: There is no significant disc herniation, spinal canal stenosis or    neural foraminal narrowing.         L4-L5: There is no significant disc herniation, spinal canal stenosis or    neural foraminal narrowing.         L5-S1: Broad disc osteophyte complex is identified with associated small left    paracentral disc protrusion.  There is resultant compression of the left S1    nerve root and impingement upon the right S1 nerve root.         At S1-S2 no significant focal disc protrusion or stenosis is appreciated.         EMG:  None  Results for orders placed or performed during the hospital encounter of 05/27/18   Urine Culture    Specimen: Urine, clean catch   Result Value Ref Range    Urine Culture, Routine      Organism Staphylococcus epidermidis (A)     Urine Culture, Routine >100,000 CFU/ml        Susceptibility    Staphylococcus epidermidis - BACTERIAL SUSCEPTIBILITY PANEL BY ALAINA     ceFAZolin <=4 Sensitive mcg/mL     ciprofloxacin <=1 Sensitive mcg/mL     nitrofurantoin <=32 Sensitive mcg/mL     oxacillin <=0.25 Sensitive mcg/mL     tetracycline <=4 Sensitive mcg/mL     trimethoprim-sulfamethoxazole >2/38 Resistant mcg/mL   CBC Auto Differential   Result Value Ref Range    WBC 8.6 4.0 - 11.0 K/uL    RBC 5.43 4.20 - 5.90 M/uL    Hemoglobin 16.0 13.5 - 17.5 g/dL    Hematocrit 46.7 40.5 - 52.5 %    MCV 86.0 80.0 - 100.0 fL    MCH 29.5 26.0 - 34.0 pg    MCHC 34.3 31.0 - 36.0 g/dL    RDW 12.7 12.4 - 15.4 %    Platelets 495 081 - 199 K/uL    MPV 7.9 5.0 - 10.5 fL    Neutrophils % 61.7 %    Lymphocytes % 28.7 %    Monocytes % 7.3 %    Eosinophils % 1.9 %    Basophils % 0.4 %    Neutrophils Absolute 5.3 1.7 - 7.7 K/uL    Lymphocytes Absolute 2.5 1.0 - 5.1 K/uL    Monocytes Absolute 0.6 0.0 - 1.3 K/uL    Eosinophils Absolute 0.2 0.0 - 0.6 K/uL    Basophils Absolute 0.0 0.0 - 0.2 K/uL   Comprehensive Metabolic Panel   Result Value Ref Range    Sodium 138 136 - 145 mmol/L    Potassium 4.0 3.5 - 5.1 mmol/L    Chloride 100 99 - 110 mmol/L    CO2 27 21 - 32 mmol/L    Anion Gap 11 3 - 16    Glucose 93 70 - 99 mg/dL    BUN 12 7 - 20 mg/dL    CREATININE 0.7 (L) 0.9 - 1.3 mg/dL    GFR Non-African American >60 >60    GFR African American >60 >60    Calcium 9.6 8.3 - 10.6 mg/dL    Total Protein 7.3 6.4 - 8.2 g/dL    Alb 4.4 3.4 - 5.0 g/dL    Albumin/Globulin Ratio 1.5 1.1 - 2.2    Total Bilirubin 0.4 0.0 - 1.0 mg/dL    Alkaline Phosphatase 120 40 - 129 U/L    ALT 15 10 - 40 U/L    AST 13 (L) 15 - 37 U/L    Globulin 2.9 g/dL   Urinalysis Reflex to Culture    Specimen: Urine, clean catch   Result Value Ref Range    Color, UA YELLOW Straw/Yellow    Clarity, UA Clear Clear    Glucose, Ur Negative Negative mg/dL    Bilirubin Urine Negative Negative    Ketones, Urine Negative Negative mg/dL    Specific Gravity, UA 1.024 1.005 - 1.030    Blood, Urine Negative Negative    pH, UA 6.0 5.0 - 8.0    Protein, UA Negative Negative mg/dL    Urobilinogen, Urine 0.2 <2.0 E.U./dL    Nitrite, Urine POSITIVE (A) Negative    Leukocyte Esterase, Urine Negative Negative    Microscopic Examination YES     Urine Reflex to Culture Yes     Urine Type Not Specified    Protime-INR   Result Value Ref Range    Protime 11.7 9.6 - 13.0 sec    INR 1.04 0.85 - 1.15   Lipase   Result Value Ref Range    Lipase 26.0 13.0 - 60.0 U/L   Blood Occult Stool Screen #1   Result Value Ref Range    Occult Blood Screening Result: Negative  Normal range: Negative      Microscopic Urinalysis   Result Value Ref Range    Bacteria, UA 4+ (A) /HPF    Hyaline Casts, UA 0 0 - 8 /LPF    WBC, UA 2 0 - 5 /HPF    RBC, UA 0 0 - 4 /HPF    Epithelial Cells, UA 0 0 - 5 /HPF       Impression (Medical Decision Making):       1. Lumbar radiculopathy    2. HNP (herniated nucleus pulposus), lumbar    3. DDD (degenerative disc disease), lumbar        Plan (Medical Decision Making):    I discussed the diagnosis and the treatment options with Kavita Ryan today. In Summary:  The various treatment options were outlined and discussed with Kavita Ryan including:  Conservative care options: physical therapy, ice, medications, bracing, and activity modification. The indications for therapeutic injections. The indications for additional imaging/laboratory studies. The indications for (possible future) interventions. After considering the various options discussed, Kavita Ryan elected to pursue a course of treatment that includes the followin. Medications:  The patient will continue to take Flexeril 10 mg 1 p.o. nightly. The patient will inform his pharmacy when a refill is needed and we will refill this at this time. 2. PT:  PT not indicated due to increased pain. 3. Further studies: COVID-19 testing prior to procedure. 4. Interventional:  We discussed pursuing a Left L5 and S1 transforaminal  epidural steroid injection to address the pain. Radiologic imaging and symptoms confirm the pain etiology. Risks, benefits and alternatives of interventional options were discussed. These include and are not limited to bleeding, infection, spinal headache, nerve injury, increased pain and lack of pain relief. The patient verbalized understanding and would like to proceed. The patient will be scheduled accordingly. GILMA #1. First Epidural steroid injection for therapeutic purposes    As such, I have confirmed the patient has met the general requirements including failure of conservative management including prescription strength analgesics, adjunctive medications were utilized , therapeutic exercise program, and no underlying addiction or behavioral disorders were identified to the ability of the provider. Symptoms are impacting their ADLs or iADLs such as walking, transferring, standing and significant pain was noted in the HPI. Imaging studies as noted in the diagnostic imaging section of the consultation were reviewed and correlated with clinical findings. Fluoroscopy is utilized for interventional procedures. The patient presents with radicular pain or claudication type symptoms associated with functional impairment. Review of diagnostic imaging in the diagnostic studies section of the consultation shows nerve root compression and correlates with clinical findings. The patient has failed at least 4 weeks of appropriate conservative management.     5. Healthy Lifestyle Measures:  Patient education material reviewing the following was distributed to [facility]  Anatomic drawings  Healthy lifestyle education  Osteoporosis prevention,   Back and neck pain educational information   Advanced imaging preparedness    Posture education   Proper lifting and carrying techniques,   Weight management  Quitting smoking and   Minor ways to treat back pain  For further information regarding the spine conditions and to review interventional treatments the patient was directed to [employer].    6.  Follow up:  4-6 weeks    Ga Escaalnte was instructed to call the office if his symptoms worsen or if new symptoms appear prior to the next scheduled visit. He is specifically instructed to contact the office between now & his scheduled appointment if he has concerns related to his condition or if he needs assistance in scheduling the above tests. He is welcome to call for an appointment sooner if he has any additional concerns or questions. Matt Barton ATC, am scribing for and in the presence of Eran Enriquez PA-C.    08/03/20 2:05 PM Sonido Young ATC    The physical examination was performed between the patient and Heavenly Wiseman PA-C All counseling during the appointment was performed between the patient and provider. Brigido Rizzo PA-C, personally performed the services described in this documentation as scribed by YULIANA Akhtar in my presence and it is both accurate and complete. GEORGE Monsivais PA-C  Board Certified by the [employer] on Certification of Physician Assistants  Marcia Franklin 56  Partner of [facility] ([facility])       This dictation was performed with a verbal recognition program [facility] ) and it was checked for errors. It is possible that there are still dictated errors within this office note. If so, please bring any errors to my attention for an addendum. All efforts were made to ensure that this office note is accurate.

## 2020-08-13 ENCOUNTER — TELEPHONE (OUTPATIENT)
Dept: ORTHOPEDIC SURGERY | Age: 20
End: 2020-08-13

## 2020-08-13 RX ORDER — CYCLOBENZAPRINE HCL 5 MG
5 TABLET ORAL NIGHTLY
Qty: 30 TABLET | Refills: 0 | Status: SHIPPED | OUTPATIENT
Start: 2020-08-13 | End: 2020-09-12

## 2020-08-13 NOTE — TELEPHONE ENCOUNTER
Medication has been sent to the pharmacy.
Pt called and stated prescription is not at the pharmacy and needs it called in
utilized , therapeutic exercise program, and no underlying addiction or behavioral disorders were identified to the ability of the provider. Symptoms are impacting their ADLs or iADLs such as walking, transferring, standing and significant pain was noted in the HPI. Imaging studies as noted in the diagnostic imaging section of the consultation were reviewed and correlated with clinical findings. Fluoroscopy is utilized for interventional procedures.     The patient presents with radicular pain or claudication type symptoms associated with functional impairment. Review of diagnostic imaging in the diagnostic studies section of the consultation shows nerve root compression and correlates with clinical findings. The patient has failed at least 4 weeks of appropriate conservative management.     5. Healthy Lifestyle Measures:  Patient education material reviewing the following was distributed to [facility]  Anatomic drawings  Healthy lifestyle education  Osteoporosis prevention,   Back and neck pain educational information   Advanced imaging preparedness    Posture education   Proper lifting and carrying techniques,   Weight management  Quitting smoking and   Minor ways to treat back pain  For further information regarding the spine conditions and to review interventional treatments the patient was directed to [employer].     6. Follow up:  4-6 weeks     [facility] was instructed to call the office if his symptoms worsen or if new symptoms appear prior to the next scheduled visit. He is specifically instructed to contact the office between now & his scheduled appointment if he has concerns related to his condition or if he needs assistance in scheduling the above tests. He is welcome to call for an appointment sooner if he has any additional concerns or questions.

## 2020-08-17 ENCOUNTER — TELEPHONE (OUTPATIENT)
Dept: ORTHOPEDIC SURGERY | Age: 20
End: 2020-08-17

## 2020-08-17 NOTE — TELEPHONE ENCOUNTER
Drea Kellogg [id number]    Date: 8/31/2020 thru 9/30/2020  Type of SX:  Outpatient GILMA  Location: [facility]  CPT: 25815, 60894   DX Code: M54.16, M51.26, M51.36  SX area: Lumbar spine  Insurance: [employer]

## 2020-08-26 ENCOUNTER — OFFICE VISIT (OUTPATIENT)
Dept: PRIMARY CARE CLINIC | Age: 20
End: 2020-08-26
Payer: COMMERCIAL

## 2020-08-26 PROCEDURE — G8419 CALC BMI OUT NRM PARAM NOF/U: HCPCS | Performed by: NURSE PRACTITIONER

## 2020-08-26 PROCEDURE — G8428 CUR MEDS NOT DOCUMENT: HCPCS | Performed by: NURSE PRACTITIONER

## 2020-08-26 PROCEDURE — 99211 OFF/OP EST MAY X REQ PHY/QHP: CPT | Performed by: NURSE PRACTITIONER

## 2020-08-26 NOTE — PATIENT INSTRUCTIONS
You have received a viral test for COVID-19. Below is education on quarantine per the CDC guidelines. For any symptoms, seek care from your PCP, call [phone] to establish care with a doctor, or go directly to an urgent care or the emergency room. Test results will take 2-7 days and will be sent to you in your [employer] account. If you test positive, you will be contacted via phone. If you test negative, the ONLY communication will be through [address] GO TO [employer] AND SIGN UP FOR [employer]  (LOWER LEFT OF THE HOME PAGE)  No test is 100%. If you have symptoms, you should follow the guidance of quarantine as previously stated. You can still be contagious if you have symptoms. Your [facility] will reach out to you if you have a positive result. They will provide you with a return to work date and note. If you were tested for a pre-op, then you should remain in quarantine until your procedure. How do I know if I need to be in quarantine? If you live in a community where COVID-19 is or might be spreading (currently, that is virtually everywhere in the [place])  Be alert for symptoms. Watch for fever, cough, shortness of breath, or other symptoms of COVID-19.  Take your temperature if symptoms develop.  Practice social distancing. Maintain 6 feet of distance from others and stay out of crowded places.  Follow CDC guidance if symptoms develop. If you feel healthy but:   Recently had close contact with a person with COVID-19 you need to Quarantine:   Stay home until 14 days after your last exposure.  Check your temperature twice a day and watch for symptoms of COVID-19.  If possible, stay away from people who are at higher-risk for getting very sick from COVID-19.   Stay Home and Monitor Your Health if you:   Have been diagnosed with COVID-19, or   Are waiting for test results, or   Have cough, fever, or shortness of breath, or symptoms of COVID-19      When You Can

## 2020-08-26 NOTE — PROGRESS NOTES
Phi Rodas received a viral test for COVID-19. They were educated on isolation and quarantine as appropriate. For any symptoms, they were directed to seek care from their PCP, given contact information to establish with a doctor, directed to an urgent care or the emergency room.

## 2020-08-28 LAB — SARS-COV-2, NAA: NOT DETECTED

## 2020-08-31 ENCOUNTER — HOSPITAL ENCOUNTER (OUTPATIENT)
Age: 20
Setting detail: OUTPATIENT SURGERY
Discharge: HOME | End: 2020-08-31
Attending: PHYSICAL MEDICINE & REHABILITATION | Admitting: PHYSICAL MEDICINE & REHABILITATION
Payer: COMMERCIAL

## 2020-08-31 ENCOUNTER — APPOINTMENT (OUTPATIENT)
Dept: GENERAL RADIOLOGY | Age: 20
End: 2020-08-31
Attending: PHYSICAL MEDICINE & REHABILITATION
Payer: COMMERCIAL

## 2020-08-31 VITALS
DIASTOLIC BLOOD PRESSURE: 78 MMHG | SYSTOLIC BLOOD PRESSURE: 123 MMHG | HEART RATE: 68 BPM | HEIGHT: 71 IN | OXYGEN SATURATION: 100 % | BODY MASS INDEX: 25.9 KG/M2 | RESPIRATION RATE: 16 BRPM | WEIGHT: 185 LBS | TEMPERATURE: 98.1 F

## 2020-08-31 PROCEDURE — 3610000056 HC PAIN LEVEL 4 BASE (NON-OR): Performed by: PHYSICAL MEDICINE & REHABILITATION

## 2020-08-31 PROCEDURE — 2500000003 HC RX 250 WO HCPCS: Performed by: PHYSICAL MEDICINE & REHABILITATION

## 2020-08-31 PROCEDURE — 99152 MOD SED SAME PHYS/QHP 5/>YRS: CPT | Performed by: PHYSICAL MEDICINE & REHABILITATION

## 2020-08-31 PROCEDURE — 2709999900 HC NON-CHARGEABLE SUPPLY: Performed by: PHYSICAL MEDICINE & REHABILITATION

## 2020-08-31 PROCEDURE — 6360000002 HC RX W HCPCS: Performed by: PHYSICAL MEDICINE & REHABILITATION

## 2020-08-31 PROCEDURE — 3209999900 FLUORO FOR SURGICAL PROCEDURES

## 2020-08-31 RX ORDER — LIDOCAINE HYDROCHLORIDE 10 MG/ML
INJECTION, SOLUTION INFILTRATION; PERINEURAL
Status: COMPLETED | OUTPATIENT
Start: 2020-08-31 | End: 2020-08-31

## 2020-08-31 RX ORDER — BUPIVACAINE HYDROCHLORIDE 5 MG/ML
INJECTION, SOLUTION EPIDURAL; INTRACAUDAL
Status: COMPLETED | OUTPATIENT
Start: 2020-08-31 | End: 2020-08-31

## 2020-08-31 RX ORDER — FENTANYL CITRATE 50 UG/ML
INJECTION, SOLUTION INTRAMUSCULAR; INTRAVENOUS
Status: COMPLETED | OUTPATIENT
Start: 2020-08-31 | End: 2020-08-31

## 2020-08-31 RX ORDER — BETAMETHASONE SODIUM PHOSPHATE AND BETAMETHASONE ACETATE 3; 3 MG/ML; MG/ML
INJECTION, SUSPENSION INTRA-ARTICULAR; INTRALESIONAL; INTRAMUSCULAR; SOFT TISSUE
Status: COMPLETED | OUTPATIENT
Start: 2020-08-31 | End: 2020-08-31

## 2020-08-31 RX ORDER — MIDAZOLAM HYDROCHLORIDE 1 MG/ML
INJECTION INTRAMUSCULAR; INTRAVENOUS
Status: COMPLETED | OUTPATIENT
Start: 2020-08-31 | End: 2020-08-31

## 2020-08-31 ASSESSMENT — PAIN - FUNCTIONAL ASSESSMENT
PAIN_FUNCTIONAL_ASSESSMENT: PREVENTS OR INTERFERES SOME ACTIVE ACTIVITIES AND ADLS
PAIN_FUNCTIONAL_ASSESSMENT: 0-10

## 2020-08-31 ASSESSMENT — PAIN SCALES - GENERAL
PAINLEVEL_OUTOF10: 0
PAINLEVEL_OUTOF10: 0

## 2020-08-31 ASSESSMENT — PAIN DESCRIPTION - DESCRIPTORS: DESCRIPTORS: OTHER (COMMENT)

## 2020-08-31 NOTE — OP NOTE
Patient:  Bhumi Morales  YOB: 2000  Medical Record #:  [id number]   Place: [address] [place] [address]  Date:  8/31/2020   Physician:  Jama Yi MD, YESSICA    Procedure: 1. Transforaminal Lumbar Epidural Steroid Injection -  left L5  CPT 87065          2. Transforaminal Lumbar Epidural Steroid Injection -  left S1  CPT 23030    Pre-Procedure Diagnosis: Lumbar radiculopathy    GILMA #1    Post-Procedure Diagnosis: Same    Sedation: Local with 1% Lidocaine 3 ml and 1 mg of IV Versed with 25 mcg of IV Fentanyl    EBL: None    Complications: None    Procedure Summary:        The patient was brought to the procedure suite and placed in the prone position. The skin overlying the lumbar spine was prepped and draped in the usual sterile fashion. Using fluoroscopic guidance, the left L5 foramen was identified. Through anesthetized skin, a 22 gauge 3.5 inch curved tip spinal needle was advanced into the foramen. Isovue M 300 was instilled showing an epidurogram/nerve root outline pattern without evidence of vascular or intrathecal spread. Following which, 7.5 mg of Celestone mixed with 1 ml of 0.5% Marcaine was instilled. The needle was removed. Using fluoroscopic guidance, the left S1 foramen was identified. Through anesthetized skin, a 22 gauge 3.5 inch curved tip spinal needle was advanced into the foramen. Isovue M 300 was instilled showing an epidurogram/nerve root outline pattern without evidence of vascular or intrathecal spread. Following which, 7.5 mg of Celestone mixed with 1 ml of 0.5% Marcaine was instilled. The needle was removed and band-aids were applied. The patient was transferred to the post-operative area in stable condition.

## 2020-08-31 NOTE — OP NOTE
Patient:  Sheila Schroeder  YOB: 2000  Medical Record #:  [id number]   Place: [facility]  Date:  8/31/2020   Physician:  Shelby Waterman MD, YESSICA    Procedure: 1. Transforaminal Lumbar Epidural Steroid Injection -  left L5  CPT 41682          2. Transforaminal Lumbar Epidural Steroid Injection -  left S1  CPT 51686    Pre-Procedure Diagnosis: Lumbar radiculopathy      Post-Procedure Diagnosis: Same    Sedation: Local with 1% Lidocaine 3 ml and 1 mg of IV Versed     EBL: None    Complications: None    Procedure Summary:        The patient was brought to the procedure suite and placed in the prone position. The skin overlying the lumbar spine was prepped and draped in the usual sterile fashion. Using fluoroscopic guidance, the left L5 foramen was identified. Through anesthetized skin, a 22 gauge 3.5 inch curved tip spinal needle was advanced into the foramen. Isovue M 300 was instilled showing an epidurogram/nerve root outline pattern without evidence of vascular or intrathecal spread. Following which, 7.5 mg of Celestone mixed with 1 ml of 0.5% Marcaine was instilled. The needle was removed. Using fluoroscopic guidance, the left S1 foramen was identified. (The S1 level is lumbarized per the MRI.) Through anesthetized skin, a 22 gauge 3.5 inch curved tip spinal needle was advanced into the foramen. Isovue M 300 was instilled showing an epidurogram/nerve root outline pattern without evidence of vascular or intrathecal spread. Following which, 7.5 mg of Celestone mixed with 1 ml of 0.5% Marcaine was instilled. The needle was removed and band-aids were applied. The patient was transferred to the post-operative area in stable condition.

## 2020-08-31 NOTE — H&P
HISTORY AND PHYSICAL/PRE-SEDATION ASSESSMENT    Patient:  Link Screen   :  2000  Medical Record No.:  [id number]   Date:  2020  Physician:  Checo Hernandez M.D. Facility: [address] [place] [address]    HISTORY OF PRESENT ILLNESS:                 The patient is a 23 y.o. male whom presents with low back and left leg pain. Review of the imaging and physical exam of the patient confirmed the pre-procedure diagnosis. After a thorough discussion of risks, benefits and alternatives informed consent was obtained. Past Medical History:   Past Medical History:   Diagnosis Date    Anxiety     Medical history reviewed with no changes     Sciatica, left side     left leg      Past Surgical History:     History reviewed. No pertinent surgical history. Current Medications:   Prior to Admission medications    Medication Sig Start Date End Date Taking? Authorizing Provider   cyclobenzaprine (FLEXERIL) 5 MG tablet Take 1 tablet by mouth nightly 20 Yes CAPRI Anderson   meloxicam (MOBIC) 7.5 MG tablet Take 1 tablet by mouth daily    Elizabeth Ryan MD     Allergies:  Patient has no known allergies. Social History:    reports that he has been smoking cigarettes. He has been smoking about 0.50 packs per day. He has never used smokeless tobacco. He reports current alcohol use. He reports previous drug use. Family History:   Family History   Problem Relation Age of Onset    No Known Problems Mother     No Known Problems Father        Vitals: Blood pressure 114/74, pulse 92, temperature 98.1 °F (36.7 °C), temperature source Temporal, resp. rate 17, height 5' 11\" (1.803 m), weight 185 lb (83.9 kg), SpO2 100 %. PHYSICAL EXAM:including affected areas  HENT: Airway patent and reviewed  Cardiovascular: Normal rate, regular rhythm, normal heart sounds. Pulmonary/Chest: No wheezes. No rhonchi. No rales. Abdominal: Soft.  Bowel sounds are normal. No distension. Extremities: Moves all extremities equally  Cervical and Lumbar Spine: Painful range of motion, no midline tenderness       Diagnosis:Lumbar radiculopathy  M54.16   M51.26   M51.36    Plan: Proceed with planned procedure      ASA CLASS:         []   I. Normal, healthy adult           [x]   II.  Mild systemic disease            []   III. Severe systemic disease      Mallampati: Mallampati Class II - (soft palate, fauces & uvula are visible)      Sedation plan:   [x]  Local              []  Minimal                  []  General anesthesia    Patient's condition acceptable for planned procedure/sedation. Post Procedure Plan   Return to same level of care   ______________________     The patient was counseled at length about the risks of aileen Covid-19 in the cuca-operative and post-operative states including the recovery window of their procedure. The patient was made aware that aileen Covid-19 after a surgical procedure may worsen their prognosis for recovering from the virus and lend to a higher morbidity and or mortality risk. The patient was given the options of postponing their procedure. All of the risks, benefits, and alternatives were discussed. The patient does wish to proceed with the procedure. The risks and benefits as well as alternatives to the procedure have been discussed with the patient and or family. The patient and or next of kin understands and agrees to proceed.     Hank Alexander M.D.

## 2020-09-21 ENCOUNTER — OFFICE VISIT (OUTPATIENT)
Dept: ORTHOPEDIC SURGERY | Age: 20
End: 2020-09-21
Payer: COMMERCIAL

## 2020-09-21 VITALS — TEMPERATURE: 98 F | WEIGHT: 185 LBS | BODY MASS INDEX: 25.9 KG/M2 | HEIGHT: 71 IN | RESPIRATION RATE: 12 BRPM

## 2020-09-21 PROCEDURE — 99213 OFFICE O/P EST LOW 20 MIN: CPT | Performed by: PHYSICIAN ASSISTANT

## 2020-09-21 PROCEDURE — 4004F PT TOBACCO SCREEN RCVD TLK: CPT | Performed by: PHYSICIAN ASSISTANT

## 2020-09-21 PROCEDURE — G8419 CALC BMI OUT NRM PARAM NOF/U: HCPCS | Performed by: PHYSICIAN ASSISTANT

## 2020-09-21 PROCEDURE — G8427 DOCREV CUR MEDS BY ELIG CLIN: HCPCS | Performed by: PHYSICIAN ASSISTANT

## 2020-09-21 NOTE — PROGRESS NOTES
Follow up: SPINE    Farida Plants  2000  [id number]         Chief Complaint   Patient presents with    Lower Back Pain     F/u Left L5 & Left S1 TF 8/31         HISTORY OF PRESENT ILLNESS:  Mr. Jaret Kennedy is a 23 y.o. male returns for a follow up visit for multiple medical problems. His current presenting problems are   1. Lumbar radiculopathy    2. HNP (herniated nucleus pulposus), lumbar    3. DDD (degenerative disc disease), lumbar    . As per information/history obtained from the PADT(patient assessment and documentation tool) - He complains of pain in the lower back. He rates the pain 0/10 and describes it as pressure. Pain is made worse by: nothing. He denies side effects from the current pain regimen. Patient reports that since the last follow up visit the physical functioning is better, family/social relationships are better, mood is better and sleep patterns are better, and that the overall functioning is better. Patient denies neurological bowel or bladder. Mr. Jaret Kennedy presents today for follow up of his ongoing low back pain. He was previously having left leg numbness but reports this has since improved. He recently underwent a left L5 and left S1 transforamial epidural steroid injection on 8/31/20. He reports overall feeling 99% improved from this procedure in regards to his back pain and functional ability. He notes having some continued mild \"pressure\" in his low back from time to time since his procedure but reports overall this does not stop him from function day to day. The patient denies any new injuries or triggers to his back since his procedure. He also denies any side effects to his procedure. He does not present today using any ambulatory devices or braces for support.        Associated signs and symptoms:   Neurogenic bowel or bladder symptoms:  no   Perceived weakness:  no   Difficulty walking:  no            Past medical, surgical, social and family history reviewed with the patient. Past Medical History:   Past Medical History:   Diagnosis Date    Anxiety     Medical history reviewed with no changes     Sciatica, left side     left leg      Past Surgical History:     Past Surgical History:   Procedure Laterality Date    PAIN MANAGEMENT PROCEDURE Left 8/31/2020    LEFT L5 AND S1 TRANSFORAMINAL EPIDURAL STEROID INJECTION performed by Jennifer Ellington MD at [facility]     Current Medications:     Current Outpatient Medications:     meloxicam (MOBIC) 7.5 MG tablet, Take 1 tablet by mouth daily, Disp: 90 tablet, Rfl: 1  Allergies:  Patient has no known allergies. Social History:    reports that he has been smoking cigarettes. He has been smoking about 0.50 packs per day. He has never used smokeless tobacco. He reports current alcohol use. He reports previous drug use. Family History:   Family History   Problem Relation Age of Onset    No Known Problems Mother     No Known Problems Father        REVIEW OF SYSTEMS:   CONSTITUTIONAL: Denies unexplained weight loss, fevers, chills or fatigue  NEUROLOGICAL: Denies unsteady gait or progressive weakness  MUSCULOSKELETAL: Denies joint swelling or redness  GI: Denies nausea, vomiting, diarrhea   : Denies bowel or bladder issues       PHYSICAL EXAM:    Vitals: Temperature 98 °F (36.7 °C), resp. rate 12, height 5' 11\" (1.803 m), weight 185 lb (83.9 kg). GENERAL EXAM:  · General Apparence: Patient is adequately groomed with no evidence of malnutrition. · Psychiatric: Orientation: The patient is oriented to time, place and person. The patient's mood and affect are appropriate   · Vascular: Examination reveals no swelling and palpation reveals no tenderness in upper or lower extremities. Good capillary refill.    · The lymphatic examination of the neck, axillae and groin reveals all areas to be without enlargement or induration  · Sensation is intact without deficit in the upper and lower extremities to light touch and pinprick  · Coordination of the upper and lower extremities are normal.  · RIGHT UPPER EXTREMITY:  Inspection/examination of the right upper extremity does not show any tenderness, deformity or injury. Range of motion is unremarkable and pain-free. There is no gross instability. There are no rashes, ulcerations or lesions. Strength and tone are normal. No atrophy or abnormal movements are noted. · LEFT UPPER EXTREMITY: Inspection/examination of the left upper extremity does not show any tenderness, deformity or injury. Range of motion is unremarkable and pain-free. There is no gross instability. There are no rashes, ulcerations or lesions. Strength and tone are normal. No atrophy or abnormal movements are noted. LUMBAR/SACRAL EXAMINATION:  · Inspection: Local inspection shows no step-off or bruising. Lumbar alignment is normal. No instability is noted. · Palpation:   No evidence of tenderness at the midline. Lumbar paraspinal tenderness: No tenderness to palpation of the lumbar paraspinals  Bursal tenderness No tenderness bilaterally  There is no paraspinal spasm. · Range of Motion: pain-free ROM  · Strength:   Strength testing is 5/5 in all muscle groups tested. · Special Tests:   Straight leg raise and crossed SLR negative. Gordon's testing is negative bilaterally. FADIR's testing is negative bilaterally. Bowstring test negative. Slump test negative. · Skin: There are no rashes, ulcerations or lesions. · Reflexes: Reflexes are symmetrically 1+ at the patellar and ankle tendons. Clonus absent bilaterally at the feet. · Gait & station: normal, patient ambulates without assistance and no ataxia  · Additional Examinations:  · RIGHT LOWER EXTREMITY: Inspection/examination of the right lower extremity does not show any tenderness, deformity or injury. Range of motion is normal and pain-free. There is no gross instability. There are no rashes, ulcerations or lesions.  Strength and tone are normal. No atrophy or abnormal movements are noted. · LEFT LOWER EXTREMITY:  Inspection/examination of the left lower extremity does not show any tenderness, deformity or injury. Range of motion is normal and pain-free. There is no gross instability. There are no rashes, ulcerations or lesions. Strength and tone are normal. No atrophy or abnormal movements are noted. Diagnostic Testing:    MR Lumbar spine shows  20:  Impression    Broad disc osteophyte complex at L5-S1 with a small associated focal left    paracentral component.  These changes result in compression of the left S1    nerve root and mild impingement upon the right S1 nerve.         The S1 vertebral body is transitional and lumbarized. Results for orders placed or performed in visit on 20   COVID-19   Result Value Ref Range    SARS-CoV-2, SMITHA NOT DETECTED NOT DETECTED     Impression:       1. Lumbar radiculopathy    2. HNP (herniated nucleus pulposus), lumbar    3. DDD (degenerative disc disease), lumbar        Plan:  Clinical Course: Above diagnoses are improving     I discussed the diagnosis and the treatment options with Amado Machado today. In Summary:  The various treatment options were outlined and discussed with Amado Machado including:  Conservative care options: physical therapy, ice, medications, bracing, and activity modification. The indications for therapeutic injections. The indications for additional imaging/laboratory studies. The indications for (possible future) interventions. After considering the various options discussed, Amado Machado elected to pursue a course of treatment that includes the followin. Medications:  No further recommendations for new medications. 2. PT:  Encouraged to continue with Home exercise program.    3. Further studies: No further studies. 4. Interventional:  No further intervention at this time. The patient is 99% improved at this time following a LESI.      5. Follow up:  As needed. Cassi Chappell was instructed to call the office if his symptoms worsen or if new symptoms appear prior to the next scheduled visit. He is specifically instructed to contact the office between now & his scheduled appointment if he has concerns related to his condition or if he needs assistance in scheduling the above tests. He is welcome to call for an appointment sooner if he has any additional concerns or questions. IMichelle, am scribing for [employer] Osorio.  09/21/20 1:58 PM Michelle Truong. The physical examination was performed between the patient and CAPRI Castillo. All counseling during the appointment was performed between the patient and the provider. Kevin Haywood PA-C, personally performed the services described in this documentation as scribed by Michelle Turcios ATC in my presence and it is both accurate and complete. GEORGE Gonzales PA-C  Board Certified by the [employer] on Certification of Physician Assistants  [address] [place] [address]  Partner of [facility] ([facility])           This dictation was performed with a verbal recognition program [facility]) and it was checked for errors. It is possible that there are still dictated errors within this office note. If so, please bring any errors to my attention for an addendum. All efforts were made to ensure that this office note is accurate.

## 2020-10-01 ENCOUNTER — TELEPHONE (OUTPATIENT)
Dept: INTERNAL MEDICINE CLINIC | Age: 20
End: 2020-10-01

## 2020-10-01 NOTE — TELEPHONE ENCOUNTER
----- Message from Gisela De La Paz sent at 10/1/2020  3:32 PM EDT -----  Subject: Message to Provider    QUESTIONS  Information for Provider? Patient has appt with Juanis Dill on 10/20 but is   need of refill of medication before then. Meds are not on list but is 5mg   tab 1x daily Cylobenzaprine.  ---------------------------------------------------------------------------  --------------  CALL BACK INFO  What is the best way for the office to contact you? OK to leave message on   voicemail  Preferred Call Back Phone Number? [phone]  ---------------------------------------------------------------------------  --------------  SCRIPT ANSWERS  Relationship to Patient?  Self

## 2020-10-19 ASSESSMENT — ENCOUNTER SYMPTOMS
SORE THROAT: 0
SHORTNESS OF BREATH: 0
WHEEZING: 0
COUGH: 0
VOMITING: 0
SINUS PAIN: 0
DIARRHEA: 0
NAUSEA: 0
CONSTIPATION: 0
ABDOMINAL PAIN: 0
BLOOD IN STOOL: 0

## 2020-10-19 NOTE — PROGRESS NOTES
New Patient Office Visit  10/20/2020    Subjective:  Chief Complaint   Patient presents with    Establish Care     muscle spasms-  was taking cyclobenzaprine      HPI:   Julia Adan is a 23 y.o. male who presents to the clinic today to establish care. Low back/left leg pain-following with Dr. Winter Taylor, pain management. Injections have been given. Also saw ortho. Reports his pain is resolved at this time-he is following with this provider on an as-needed basis. Muscle spasms- states his muscles are weak d/t the months of immobility from sciatica pains. Performed PT and states he does the exercises. Muscles \"throughout my body- my neck, arms, legs, stomach, back, anywhere really. \" States he takes cyclobenzaprine to help relieve this. States this gives him more energy. He would like to take muscle relaxants daily- states he needs a script. Has used anti-inflammatories in the past per pt- states he is unsure what he used. He states he is unsure how much water he drinks per day. States he thinks he is hydrated. This has been present since birth per pt. States he thinks these are worsening since being off of muscle relaxants. Abdominal spasms occur are the worst per pt-occurring 6-7 days a week-occurs for 1-2 hours and resolves spontaneously. States that this occurs worse when he lies down. States this \"feels like acid. \" He also reports constipation - takes OTC stool softeners with relief. No blood in stool today- states he did when he was younger and he \"did not follow through with treatment. \" Uses TUMS and states this helps intermittently. Anxiety- pt reports this was in the past. States this is now well controlled with lifestyle modifications. Intermittent panic attacks- 2x/month. Does not take medications or see psychology. Previously took medications- but states he forgets what this is called. ADHD- states he thinks he has this. Previously took medications-  Adderall.  Has not seen psychology. Smoker- 0.5 ppd. Trying to cut down. Not ready to quit yet per pt. Using vape occasionally to cut down. Mom lives nearby. 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 Enjoys video games and computers. Reports: \"I am kind of a hermit. \"    Review of Systems   Constitutional: Negative for chills, fatigue, fever and unexpected weight change. HENT: Negative for congestion, postnasal drip, sinus pain, sore throat and tinnitus. Respiratory: Negative for cough, shortness of breath and wheezing. Cardiovascular: Negative for chest pain, palpitations and leg swelling. Gastrointestinal: Negative for abdominal pain, blood in stool, constipation, diarrhea, nausea and vomiting. Genitourinary: Negative for dysuria, frequency, hematuria and urgency. Musculoskeletal: Negative for gait problem, myalgias and neck pain. Muscle spasms throughout body   Skin: Negative for pallor and rash. Neurological: Negative for dizziness, weakness, light-headedness, numbness and headaches. Psychiatric/Behavioral: Negative for behavioral problems, confusion, dysphoric mood, sleep disturbance and suicidal ideas. The patient is nervous/anxious.       No Known Allergies     Family History   Problem Relation Age of Onset    Depression Mother     Anemia Mother     High Blood Pressure Mother     Alcohol Abuse Mother     Alcohol Abuse Father     Prostate Cancer Neg Hx     Heart Attack Neg Hx     Stroke Neg Hx      Social History     Socioeconomic History    Marital status: Single     Spouse name: Not on file    Number of children: Not on file    Years of education: Not on file    Highest education level: Not on file   Occupational History    Not on file   Social Needs    Financial resource strain: Not on file    Food insecurity     Worry: Not on file     Inability: Not on file    Transportation needs     Medical: Not on file     Non-medical: Not on file   Tobacco Use    Smoking status: Current Every Day Smoker Packs/day: 0.50     Types: Cigarettes    Smokeless tobacco: Never Used   Substance and Sexual Activity    Alcohol use: Yes     Comment: every three months    Drug use: Not Currently     Comment: pt reports he smokes lavendar.  Sexual activity: Not on file   Lifestyle    Physical activity     Days per week: Not on file     Minutes per session: Not on file    Stress: Not on file   Relationships    Social connections     Talks on phone: Not on file     Gets together: Not on file     Attends Manda[employer] service: Not on file     Active member of club or organization: Not on file     Attends meetings of clubs or organizations: Not on file     Relationship status: Not on file    Intimate partner violence     Fear of current or ex partner: Not on file     Emotionally abused: Not on file     Physically abused: Not on file     Forced sexual activity: Not on file   Other Topics Concern    Not on file   Social History Narrative    Mom lives nearby. 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 Enjoys video games and [employer]. Reports: \"I am kind of a hermit. \"     Past Medical History:   Diagnosis Date    Anxiety     Sciatica, left side     left leg     Patient Active Problem List   Diagnosis    Left sided sciatica    Anxiety      Wt Readings from Last 3 Encounters:   10/20/20 194 lb (88 kg)   09/21/20 185 lb (83.9 kg)   08/31/20 185 lb (83.9 kg)     BP Readings from Last 3 Encounters:   10/20/20 120/79   08/31/20 123/78   05/23/20 (!) 101/52     PHQ-9 Total Score: 0 (10/20/2020 12:38 PM)    Objective/Physical Exam:  /79   Pulse 82   Temp 96.9 °F (36.1 °C) (Temporal)   Ht 5' 11\" (1.803 m)   Wt 194 lb (88 kg)   SpO2 98%   BMI 27.06 kg/m²   Body mass index is 27.06 kg/m². Physical Exam  Vitals signs reviewed. Constitutional:       General: He is not in acute distress. Appearance: He is well-developed. He is not diaphoretic. HENT:      Head: Normocephalic and atraumatic.       Right Ear: Tympanic membrane and external ear normal.      Left Ear: Tympanic membrane and external ear normal.   Eyes:      Conjunctiva/sclera: Conjunctivae normal.      Pupils: Pupils are equal, round, and reactive to light. Cardiovascular:      Rate and Rhythm: Normal rate and regular rhythm. Pulmonary:      Effort: Pulmonary effort is normal. No respiratory distress. Breath sounds: Normal breath sounds. No wheezing or rales. Chest:      Chest wall: No tenderness. Abdominal:      General: Bowel sounds are normal. There is no distension. Palpations: Abdomen is soft. Tenderness: There is no abdominal tenderness. There is no guarding. Genitourinary:     Rectum: Normal. Guaiac result negative. Musculoskeletal: Normal range of motion. General: No tenderness or deformity. Skin:     General: Skin is warm and dry. Coloration: Skin is not pale. Findings: No erythema or rash. Neurological:      Mental Status: He is alert and oriented to person, place, and time. Coordination: Coordination normal.   Psychiatric:         Mood and Affect: Mood normal.       A chaperone, Keiry Lewis MA was present during the entire rectal exam.    Assessment and Plan:  Anushka Dillard was seen today for establish care. Diagnoses and all orders for this visit:    Need for influenza vaccination  -     INFLUENZA, QUADV, 3 YRS AND OLDER, IM PF, PREFILL SYR OR SDV, 0.5ML ([employer], PF) - patient education handout provided and reviewed with the pt. Encounter to establish care with new doctor   - Lifestyle modifications such as exercise, weight loss and healthy diet encouraged and reviewed with the pt. - Labs today     Anxiety  -    Reviewed options on medications. The patient declines. - Recommend psychology referral.  Patient is agreeable to the plan. - Ambulatory referral to Psychology    Left sided sciatica   - Well controlled at this time.     - Continue with pain management    Smoker   - Smoking cessation recommended    Muscle spasm  -    No joint pains. Patient reports muscle spasms occurring since he was a child. He states that since he was started on the muscle relaxants, he realized how much they help and he does not want to stop them. - Reviewed the risks of long-term muscle relaxants. Recommended that we find the cause of the muscle spasms to prevent them. Recommend labs. - Patient reports muscle spasms are worse in his stomach. Worse when lying down and feel \"like acid. \"  Recommend the patient take Prilosec daily and reevaluate. Pt agreeable. Lifestyle modifications also reviewed. - Will prescribe 10 tablets of tizanidine PRN. Reviewed this will not be continued long term. - Recommend pt keep a symptom log of when/where symptoms occur/What makes them go away/How long they last/What he was doing when they began. Pt agreeable. - COMPREHENSIVE METABOLIC PANEL; Future  -     CBC Auto Differential; Future  -     MAGNESIUM; Future  -     tiZANidine (ZANAFLEX) 2 MG tablet; Take 1 tablet by mouth every 8 hours as needed (muscle spasms) - patient education handout provided and reviewed with the pt. Side effects reviewed. -     omeprazole (PRILOSEC) 40 MG delayed release capsule; Take 1 capsule by mouth daily - patient education handout provided and reviewed with the pt. - Pt will call if symptoms worsen or fail to improve. Screening, lipid  -     Lipid, Fasting; Future    Screening for HIV (human immunodeficiency virus)  -     HIV Screen; Future    Blood in stool  -    Patient denies recent blood in the stool. However, he states this was present in the past and he was supposed to follow-up but never did. He states he was noncompliant and lost to follow-up. - Patient reports intermittent constipation, relieved with over-the-counter stool softeners. - POCT occult blood stool; negative  - Pt will call if symptoms occur again.  - Reviewed that further imaging such as colonoscopy may be needed.  Pt states he will monitor symptoms. Over 50 minutes were spent coordinating care of the patient; over 45 minutes of which were spent face to face reviewing health education, health maintenance, and coordination of care. Return in about 4 weeks (around 11/17/2020) for muscle/abdominal spasms f/u, or sooner if needed. Pt will call if symptoms worsen or fail to improve. All questions answered. Pt states no further questions or concerns at this time.    Electronically signed by: Handy Echeverria 10/20/20

## 2020-10-20 ENCOUNTER — OFFICE VISIT (OUTPATIENT)
Dept: INTERNAL MEDICINE CLINIC | Age: 20
End: 2020-10-20
Payer: COMMERCIAL

## 2020-10-20 VITALS
HEART RATE: 82 BPM | SYSTOLIC BLOOD PRESSURE: 120 MMHG | BODY MASS INDEX: 27.16 KG/M2 | TEMPERATURE: 96.9 F | DIASTOLIC BLOOD PRESSURE: 79 MMHG | HEIGHT: 71 IN | OXYGEN SATURATION: 98 % | WEIGHT: 194 LBS

## 2020-10-20 PROBLEM — F41.9 ANXIETY: Status: ACTIVE | Noted: 2020-10-20

## 2020-10-20 LAB
HEMOCCULT STL QL: NORMAL

## 2020-10-20 PROCEDURE — G8427 DOCREV CUR MEDS BY ELIG CLIN: HCPCS | Performed by: NURSE PRACTITIONER

## 2020-10-20 PROCEDURE — 90686 IIV4 VACC NO PRSV 0.5 ML IM: CPT | Performed by: NURSE PRACTITIONER

## 2020-10-20 PROCEDURE — 4004F PT TOBACCO SCREEN RCVD TLK: CPT | Performed by: NURSE PRACTITIONER

## 2020-10-20 PROCEDURE — 99204 OFFICE O/P NEW MOD 45 MIN: CPT | Performed by: NURSE PRACTITIONER

## 2020-10-20 PROCEDURE — G8419 CALC BMI OUT NRM PARAM NOF/U: HCPCS | Performed by: NURSE PRACTITIONER

## 2020-10-20 PROCEDURE — 82270 OCCULT BLOOD FECES: CPT | Performed by: NURSE PRACTITIONER

## 2020-10-20 PROCEDURE — G8482 FLU IMMUNIZE ORDER/ADMIN: HCPCS | Performed by: NURSE PRACTITIONER

## 2020-10-20 RX ORDER — TIZANIDINE 2 MG/1
2 TABLET ORAL EVERY 8 HOURS PRN
Qty: 10 TABLET | Refills: 0 | Status: SHIPPED | OUTPATIENT
Start: 2020-10-20 | End: 2020-11-17

## 2020-10-20 RX ORDER — OMEPRAZOLE 40 MG/1
40 CAPSULE, DELAYED RELEASE ORAL DAILY
Qty: 30 CAPSULE | Refills: 0 | Status: SHIPPED | OUTPATIENT
Start: 2020-10-20 | End: 2020-11-17

## 2020-10-20 ASSESSMENT — PATIENT HEALTH QUESTIONNAIRE - PHQ9
SUM OF ALL RESPONSES TO PHQ9 QUESTIONS 1 & 2: 0
SUM OF ALL RESPONSES TO PHQ QUESTIONS 1-9: 0
SUM OF ALL RESPONSES TO PHQ QUESTIONS 1-9: 0
1. LITTLE INTEREST OR PLEASURE IN DOING THINGS: 0
SUM OF ALL RESPONSES TO PHQ QUESTIONS 1-9: 0
2. FEELING DOWN, DEPRESSED OR HOPELESS: 0

## 2020-10-20 NOTE — PATIENT INSTRUCTIONS
Please get your fasting lab work (no food or drink for 10-12 hours prior besides water) completed M-F [id number] at our office. [facility] lab has walk-in hours available as well - they are open Saturday 7a-3p - no appointment is needed. We will call with your results. Schedule with psychology    Keep a symptom log- when/where do these occur. What makes them go away. How long do they last. What were you doing when they began. Patient Education        omeprazole  Pronunciation:  oh MEP ra zol  Brand:  FIRST Omeprazole, Omeprazole + SyrSpend SF Мария, PriLOSEC, PriLOSEC OTC  What is the most important information I should know about omeprazole? Omeprazole can cause kidney problems. Tell your doctor if you are urinating less than usual, or if you have blood in your urine. Diarrhea may be a sign of a new infection. Call your doctor if you have diarrhea that is watery or has blood in it. Omeprazole may cause new or worsening symptoms of lupus. Tell your doctor if you have joint pain and a skin rash on your cheeks or arms that worsens in sunlight. You may be more likely to have a broken bone while taking this medicine long term or more than once per day. What is omeprazole? Omeprazole is a proton pump inhibitor that decreases the amount of acid produced in the stomach. Omeprazole is used to treat symptoms of gastroesophageal reflux disease (GERD) and other conditions caused by excess stomach acid. Omeprazole is also used to promote healing of erosive esophagitis (damage to your esophagus caused by stomach acid). Omeprazole may also be given together with antibiotics to treat gastric ulcer caused by infection with Helicobacter pylori (H. pylori). Over-the-counter (OTC) omeprazole is used in adults to help control heartburn that occurs 2 or more days per week. This medicine not for immediate relief of heartburn symptoms.  OTC omeprazole must be taken on a regular basis for 14 days in a (liquid) before you measure a dose. Use the dosing syringe provided, or use a medicine dose-measuring device (not a kitchen spoon). If you cannot swallow a capsule whole, open it and sprinkle the medicine into a spoonful of applesauce. Swallow the mixture right away without chewing. Do not save it for later use. You must dissolve omeprazole powder in a small amount of water. This mixture can either be swallowed or given through a nasogastric (NG) feeding tube using a catheter-tipped syringe. Use this medicine for the full prescribed length of time, even if your symptoms quickly improve. OTC omeprazole should be taken for only 14 days in a row. It may take 1 to 4 days before your symptoms improve. Allow at least 4 months to pass before you start a new 14-day course of treatment. Call your doctor if your symptoms do not improve, or if they get worse. Some conditions are treated with a combination of omeprazole and antibiotics. Use all medications as directed. This medicine can affect the results of certain medical tests. Tell any doctor who treats you that you are using omeprazole. Store at room temperature away from moisture and heat. What happens if I miss a dose? Take the medicine as soon as you can, but skip the missed dose if it is almost time for your next dose. Do not take two doses at one time. What happens if I overdose? Seek emergency medical attention or call the Poison Help line at 1-439.532.7723. What should I avoid while taking omeprazole? This medicine can cause diarrhea, which may be a sign of a new infection. If you have diarrhea that is watery or bloody, call your doctor before using anti-diarrhea medicine. What are the possible side effects of omeprazole? Get emergency medical help if you have signs of an allergic reaction: hives; difficulty breathing; swelling of your face, lips, tongue, or throat.   Stop using omeprazole and call your doctor at once if you have:  · severe stomach pain, diarrhea that is watery or bloody;  · new or unusual pain in your wrist, thigh, hip, or back;  · seizure (convulsions);  · kidney problems --little or no urination, blood in your urine, swelling, rapid weight gain;  · low magnesium --dizziness, irregular heartbeats, feeling jittery, muscle cramps, muscle spasms, cough or choking feeling; or  · new or worsening symptoms of lupus --joint pain, and a skin rash on your cheeks or arms that worsens in sunlight. Taking omeprazole long-term may cause you to develop stomach growths called fundic gland polyps. Talk with your doctor about this risk. If you use omeprazole for longer than 3 years, you could develop a vitamin B-12 deficiency. Talk to your doctor about how to manage this condition if you develop it. Common side effects may include:  · stomach pain, gas;  · nausea, vomiting, diarrhea; or  · headache. This is not a complete list of side effects and others may occur. Call your doctor for medical advice about side effects. You may report side effects to FDA at 2-303-FDA-4878. What other drugs will affect omeprazole? Sometimes it is not safe to use certain medications at the same time. Some drugs can affect your blood levels of other drugs you take, which may increase side effects or make the medications less effective. Tell your doctor about all your current medicines. Many drugs can affect omeprazole, especially:  · clopidogrel;  · methotrexate;  · Yordy's wort; or  · an antibiotic --amoxicillin, clarithromycin, rifampin. This list is not complete and many other drugs may affect omeprazole. This includes prescription and over-the-counter medicines, vitamins, and herbal products. Not all possible drug interactions are listed here. Where can I get more information? Your pharmacist can provide more information about omeprazole.   Remember, keep this and all other medicines out of the reach of children, never share your medicines with others, and use this medication only for the indication prescribed. Every effort has been made to ensure that the information provided [facility] is accurate, up-to-date, and complete, but no guarantee is made to that effect. Drug information contained herein may be time sensitive. [facility] information has been compiled for use by healthcare practitioners and consumers in the [place] and therefore [facility] does not warrant that uses outside of the [place] are appropriate, unless specifically indicated otherwise. [facility]'s drug information does not endorse drugs, diagnose patients or recommend therapy. [facility]'s drug information is an informational resource designed to assist licensed healthcare practitioners in caring for their patients and/or to serve consumers viewing this service as a supplement to, and not a substitute for, the expertise, skill, knowledge and judgment of healthcare practitioners. The absence of a warning for a given drug or drug combination in no way should be construed to indicate that the drug or drug combination is safe, effective or appropriate for any given patient. [facility] does not assume any responsibility for any aspect of healthcare administered with the aid of information [facility] provides. The information contained herein is not intended to cover all possible uses, directions, precautions, warnings, drug interactions, allergic reactions, or adverse effects. If you have questions about the drugs you are taking, check with your doctor, nurse or pharmacist.  Copyright 9714-8107 [address]: 20.01. Revision date: 4/11/2019. Care instructions adapted under license by [facility] ([facility]). If you have questions about a medical condition or this instruction, always ask your healthcare professional. [place] [address] any warranty or liability for your use of this information.        Patient Education        tizanidine  Pronunciation:  viktoriya Cleaning i valentine  Brand:  Zanaflex  What is the most important information I should know about tizanidine? You should not take tizanidine if you are also taking fluvoxamine (Luvox) or ciprofloxacin (Cipro). Do not use tizanidine at a time when you need muscle tone for safe balance and movement during certain activities. What is tizanidine? Tizanidine is a short-acting muscle relaxer. It works by blocking nerve impulses (pain sensations) that are sent to your brain. Tizanidine is used to treat spasticity by temporarily relaxing muscle tone. Tizanidine may also be used for purposes not listed in this medication guide. What should I discuss with my healthcare provider before taking tizanidine? You should not use tizanidine if you are allergic to it, or if:  · you also take the antidepressant fluvoxamine (Luvox); or  · you also take the antibiotic ciprofloxacin (Cipro). To make sure tizanidine is safe for you, tell your doctor if you have:  · liver disease;  · kidney disease; or  · low blood pressure. It is not known whether this medicine will harm an unborn baby. Tell your doctor if you are pregnant or plan to become pregnant. It is not known whether tizanidine passes into breast milk or if it could harm a nursing baby. Tell your doctor if you are breast-feeding a baby. How should I take tizanidine? Follow all directions on your prescription label. Your doctor may occasionally change your dose to make sure you get the best results. Do not use this medicine in larger or smaller amounts or for longer than recommended. In most cases you may take tizanidine up to 3 times in one day if needed. Allow 6 to 8 hours to pass between doses. You may take tizanidine with or without food, but take it the same way each time. Switching between taking tizanidine with food and taking it without food can make the medicine less effective or cause increased side effects.   Switching between tizanidine tablets and capsules can also cause changes in side effects or how well the medicine works. · Taking the tablets with food can increase your blood levels of tizanidine. · Taking the capsules with food can decrease your blood levels of tizanidine. Follow your doctor's instructions carefully. After making any changes in how you take tizanidine, contact your doctor if you notice any change in side effects or in how well the medicine works. Tizanidine is a short-acting medication, and its effects will be most noticeable between 1 and 3 hours after you take it. You should take tizanidine only for daily activities that require relief from muscle spasms. Do not take more than three doses (36 mg) in a 24-hour period. Too much of this medicine can damage your liver. You will need frequent blood tests to check your liver function. If you stop using tizanidine suddenly after long-term use, you may have withdrawal symptoms such as dizziness, fast heartbeats, tremors, and anxiety. Ask your doctor how to safely stop using this medicine. Store at room temperature away from moisture and heat. What happens if I miss a dose? Take the missed dose as soon as you remember. Skip the missed dose if it is almost time for your next scheduled dose. Do not take extra medicine to make up the missed dose. What happens if I overdose? Seek emergency medical attention or call the Poison Help line at 1-960.372.2945. Overdose symptoms may include weakness, drowsiness, confusion, slow heart rate, shallow breathing, feeling light-headed, or fainting. What should I avoid while taking tizanidine? Do not use tizanidine at a time when you need muscle tone for safe balance and movement during certain activities. In some situations, it may be dangerous for you to have reduced muscle tone. Drinking alcohol with this medicine can cause side effects. This medicine may impair your thinking or reactions. Be careful if you drive or do anything that requires you to be alert.  Avoid getting up too fast from a sitting or lying position, or you may feel dizzy. Get up slowly and steady yourself to prevent a fall. What are the possible side effects of tizanidine? Get emergency medical help if you have signs of an allergic reaction: hives; difficult breathing; swelling of your face, lips, tongue, or throat. Call your doctor at once if you have:  · a light-headed feeling, like you might pass out;  · weak or shallow breathing;  · confusion, hallucinations; or  · pain or burning when you urinate. Common side effects may include:  · drowsiness, dizziness, weakness;  · feeling nervous;  · blurred vision;  · flu-like symptoms;  · dry mouth, trouble speaking;  · abnormal liver function tests;  · runny nose, sore throat;  · urination problems;  · vomiting, constipation; or  · uncontrolled muscle movements. This is not a complete list of side effects and others may occur. Call your doctor for medical advice about side effects. You may report side effects to FDA at 0-466-FDA-7032. What other drugs will affect tizanidine? Taking tizanidine with other drugs that make you sleepy or slow your breathing can cause dangerous side effects or death. Ask your doctor before taking a sleeping pill, narcotic pain medicine, prescription cough medicine, a muscle relaxer, or medicine for anxiety, depression, or seizures. Tell your doctor about all your current medicines and any you start or stop using, especially:  · acyclovir;  · ticlopidine;  · zileuton;  · birth control pills;  · an antibiotic --ciprofloxacin, gemifloxacin, levofloxacin, moxifloxacin, or ofloxacin;  · blood pressure medicine --clonidine, guanfacine, methyldopa;  · heart rhythm medicine --amiodarone, mexiletine, propafenone, verapamil; or  · stomach acid medicine --cimetidine, famotidine. This list is not complete. Other drugs may interact with tizanidine, including prescription and over-the-counter medicines, vitamins, and herbal products.  Not all possible interactions are listed in this medication guide. Where can I get more information? Your pharmacist can provide more information about tizanidine. Remember, keep this and all other medicines out of the reach of children, never share your medicines with others, and use this medication only for the indication prescribed. Every effort has been made to ensure that the information provided by [facility] is accurate, up-to-date, and complete, but no guarantee is made to that effect. Drug information contained herein may be time sensitive. [facility] information has been compiled for use by healthcare practitioners and consumers in the [place] and therefore [facility] does not warrant that uses outside of the [place] are appropriate, unless specifically indicated otherwise. [facility]'s drug information does not endorse drugs, diagnose patients or recommend therapy. [facility]'s drug information is an informational resource designed to assist licensed healthcare practitioners in caring for their patients and/or to serve consumers viewing this service as a supplement to, and not a substitute for, the expertise, skill, knowledge and judgment of healthcare practitioners. The absence of a warning for a given drug or drug combination in no way should be construed to indicate that the drug or drug combination is safe, effective or appropriate for any given patient. [facility] does not assume any responsibility for any aspect of healthcare administered with the aid of information [facility] provides. The information contained herein is not intended to cover all possible uses, directions, precautions, warnings, drug interactions, allergic reactions, or adverse effects. If you have questions about the drugs you are taking, check with your doctor, nurse or pharmacist.  Copyright 7558-5730 [address] [place] [address] Dr HENDRICKS. Version: 3.01. Revision date: 2/27/2017. Care instructions adapted under license by [facility] ([facility]). If you have questions about a medical condition or this instruction, always ask your healthcare professional. [place] [address] any warranty or liability for your use of this information.

## 2020-10-22 DIAGNOSIS — Z11.4 SCREENING FOR HIV (HUMAN IMMUNODEFICIENCY VIRUS): ICD-10-CM

## 2020-10-22 DIAGNOSIS — Z13.220 SCREENING, LIPID: ICD-10-CM

## 2020-10-22 DIAGNOSIS — M62.838 MUSCLE SPASM: ICD-10-CM

## 2020-10-22 LAB
A/G RATIO: 2 (ref 1.1–2.2)
ALBUMIN SERPL-MCNC: 4.3 G/DL (ref 3.4–5)
ALP BLD-CCNC: 97 U/L (ref 40–129)
ALT SERPL-CCNC: 22 U/L (ref 10–40)
ANION GAP SERPL CALCULATED.3IONS-SCNC: 12 MMOL/L (ref 3–16)
AST SERPL-CCNC: 15 U/L (ref 15–37)
BASOPHILS ABSOLUTE: 0 K/UL (ref 0–0.2)
BASOPHILS RELATIVE PERCENT: 0.3 %
BILIRUB SERPL-MCNC: 0.6 MG/DL (ref 0–1)
BUN BLDV-MCNC: 11 MG/DL (ref 7–20)
CALCIUM SERPL-MCNC: 8.8 MG/DL (ref 8.3–10.6)
CHLORIDE BLD-SCNC: 100 MMOL/L (ref 99–110)
CHOLESTEROL, FASTING: 140 MG/DL (ref 0–199)
CO2: 28 MMOL/L (ref 21–32)
CREAT SERPL-MCNC: 0.7 MG/DL (ref 0.9–1.3)
EOSINOPHILS ABSOLUTE: 0.1 K/UL (ref 0–0.6)
EOSINOPHILS RELATIVE PERCENT: 2 %
GFR AFRICAN AMERICAN: >60
GFR NON-AFRICAN AMERICAN: >60
GLOBULIN: 2.1 G/DL
GLUCOSE BLD-MCNC: 83 MG/DL (ref 70–99)
HCT VFR BLD CALC: 44.2 % (ref 40.5–52.5)
HDLC SERPL-MCNC: 45 MG/DL (ref 40–60)
HEMOGLOBIN: 15.3 G/DL (ref 13.5–17.5)
LDL CHOLESTEROL CALCULATED: 83 MG/DL
LYMPHOCYTES ABSOLUTE: 1.8 K/UL (ref 1–5.1)
LYMPHOCYTES RELATIVE PERCENT: 28.2 %
MAGNESIUM: 2.3 MG/DL (ref 1.8–2.4)
MCH RBC QN AUTO: 30.7 PG (ref 26–34)
MCHC RBC AUTO-ENTMCNC: 34.5 G/DL (ref 31–36)
MCV RBC AUTO: 89 FL (ref 80–100)
MONOCYTES ABSOLUTE: 0.5 K/UL (ref 0–1.3)
MONOCYTES RELATIVE PERCENT: 7.3 %
NEUTROPHILS ABSOLUTE: 3.9 K/UL (ref 1.7–7.7)
NEUTROPHILS RELATIVE PERCENT: 62.2 %
PDW BLD-RTO: 12.6 % (ref 12.4–15.4)
PLATELET # BLD: 149 K/UL (ref 135–450)
PMV BLD AUTO: 9 FL (ref 5–10.5)
POTASSIUM SERPL-SCNC: 4.1 MMOL/L (ref 3.5–5.1)
RBC # BLD: 4.97 M/UL (ref 4.2–5.9)
SODIUM BLD-SCNC: 140 MMOL/L (ref 136–145)
TOTAL PROTEIN: 6.4 G/DL (ref 6.4–8.2)
TRIGLYCERIDE, FASTING: 62 MG/DL (ref 0–150)
VLDLC SERPL CALC-MCNC: 12 MG/DL
WBC # BLD: 6.3 K/UL (ref 4–11)

## 2020-10-23 LAB
HIV AG/AB: NORMAL
HIV ANTIGEN: NORMAL
HIV-1 ANTIBODY: NORMAL
HIV-2 AB: NORMAL

## 2020-11-17 ENCOUNTER — OFFICE VISIT (OUTPATIENT)
Dept: INTERNAL MEDICINE CLINIC | Age: 20
End: 2020-11-17
Payer: COMMERCIAL

## 2020-11-17 VITALS
WEIGHT: 187 LBS | SYSTOLIC BLOOD PRESSURE: 134 MMHG | HEART RATE: 95 BPM | BODY MASS INDEX: 26.08 KG/M2 | TEMPERATURE: 97.5 F | OXYGEN SATURATION: 98 % | DIASTOLIC BLOOD PRESSURE: 84 MMHG

## 2020-11-17 DIAGNOSIS — M62.838 MUSCLE SPASM: ICD-10-CM

## 2020-11-17 LAB
C-REACTIVE PROTEIN: 2.5 MG/L (ref 0–5.1)
RHEUMATOID FACTOR: <10 IU/ML

## 2020-11-17 PROCEDURE — 4004F PT TOBACCO SCREEN RCVD TLK: CPT | Performed by: NURSE PRACTITIONER

## 2020-11-17 PROCEDURE — G8482 FLU IMMUNIZE ORDER/ADMIN: HCPCS | Performed by: NURSE PRACTITIONER

## 2020-11-17 PROCEDURE — G8419 CALC BMI OUT NRM PARAM NOF/U: HCPCS | Performed by: NURSE PRACTITIONER

## 2020-11-17 PROCEDURE — G8427 DOCREV CUR MEDS BY ELIG CLIN: HCPCS | Performed by: NURSE PRACTITIONER

## 2020-11-17 PROCEDURE — 99214 OFFICE O/P EST MOD 30 MIN: CPT | Performed by: NURSE PRACTITIONER

## 2020-11-17 RX ORDER — DICYCLOMINE HCL 20 MG
20 TABLET ORAL 3 TIMES DAILY PRN
Qty: 90 TABLET | Refills: 0 | Status: SHIPPED | OUTPATIENT
Start: 2020-11-17 | End: 2020-12-15 | Stop reason: SDUPTHER

## 2020-11-17 RX ORDER — FAMOTIDINE 20 MG/1
20 TABLET, FILM COATED ORAL
Qty: 60 TABLET | Refills: 0 | Status: SHIPPED | OUTPATIENT
Start: 2020-11-17 | End: 2020-12-15 | Stop reason: SDUPTHER

## 2020-11-17 ASSESSMENT — ENCOUNTER SYMPTOMS
SHORTNESS OF BREATH: 0
NAUSEA: 0
VOMITING: 0
CONSTIPATION: 0
WHEEZING: 0
ABDOMINAL PAIN: 0
DIARRHEA: 0
COUGH: 0

## 2020-11-17 NOTE — PATIENT INSTRUCTIONS
breast-feeding a baby. To make sure you can safely take dicyclomine, tell your doctor if you have any of these other conditions:  · ulcerative colitis;  · an ileostomy or colostomy;  · a nerve problem (such as numbness or tingling);  · liver or kidney disease;  · heart disease, congestive heart failure, high blood pressure, or a heart rhythm disorder;  · hiatal hernia; or  · an enlarged prostate. FDA pregnancy category B. This medication is not expected to be harmful to an unborn baby. Tell your doctor if you are pregnant or plan to become pregnant during treatment. Dicyclomine can pass into breast milk and can cause breathing problems or other life-threatening side effects in infants younger than 10months of age. Do not breast feed a baby while taking this medication. Older adults may be more likely to have side effects from this medicine. Dicyclomine should not be given to a child younger than 7 months old. How should I take dicyclomine? Take exactly as prescribed by your doctor. Do not take in larger or smaller amounts or for longer than recommended. Follow the directions on your prescription label. Dicyclomine is usually taken 4 times each day. Your doctor may occasionally change your dose to make sure you get the best results. Take this medicine with a full glass of water. Measure liquid medicine with a special dose-measuring spoon or cup, not a regular table spoon. If you do not have a dose-measuring device, ask your pharmacist for one. Talk with your doctor if your symptoms do not improve after 2 weeks of treatment. Store at room temperature away from moisture and heat. What happens if I miss a dose? Take the missed dose as soon as you remember. Skip the missed dose if it is almost time for your next scheduled dose. Do not take extra medicine to make up the missed dose. What happens if I overdose? Seek emergency medical attention or call the Poison Help line at 1-438.933.7476.   Overdose symptoms may include nausea, vomiting, dilated pupils, weakness or loss of movement in any part of your body, trouble swallowing, fainting, or seizure (convulsions). What should I avoid while taking dicyclomine? This medication may cause blurred vision and may impair your thinking or reactions. Be careful if you drive or do anything that requires you to be alert and able to see clearly. Avoid becoming overheated or dehydrated during exercise and in hot weather. Dicyclomine can cause decreased sweating, which can lead to heat stroke in a hot environment. Drinking alcohol can increase certain side effects of dicyclomine. Avoid using antacids without your doctor's advice. Use only the type of antacid your doctor recommends. Some antacids can make it harder for your body to absorb dicyclomine. What are the possible side effects of dicyclomine? Get emergency medical help if you have any of these signs of an allergic reaction: hives; difficult breathing; swelling of your face, lips, tongue, or throat. Stop using dicyclomine and call your doctor at once if you have a serious side effect such as:  · severe constipation, bloating, or stomach pain;  · worsening of diarrhea or other irritable bowel symptoms;  · feeling very thirsty or hot, being unable to urinate, heavy sweating, or hot and dry skin  · confusion, hallucinations, unusual thoughts or behavior; or  · pounding heartbeats or fluttering in your chest.  Less serious side effects may include:  · drowsiness, dizziness, weakness, nervousness;  · blurred vision;  · dry mouth, stuffy nose; or  · mild constipation. This is not a complete list of side effects and others may occur. Call your doctor for medical advice about side effects. You may report side effects to FDA at 3-586-FDA-8514. What other drugs will affect dicyclomine?   Before using dicyclomine, tell your doctor if you regularly use other medicines that make you sleepy (such as cold or allergy medicine, sedatives, narcotic pain medicine, sleeping pills, muscle relaxers, and medicine for seizures, depression, or anxiety). They can add to sleepiness caused by dicyclomine. Tell your doctor about all other medicines you use, especially:  · amantadine (Symmetrel);  · digoxin (Lanoxin, Lanoxicaps);  · metoclopramide (Reglan);  · atropine (Atreza, Tyrone-Tropine), belladonna (Donnatal, and others), benztropine (Cogentin), dimenhydrinate (Dramamine), methscopolamine (Pamine), or scopolamine (Transderm Scop);  · bronchodilators such as ipratropium (Atrovent) or tiotropium (Spiriva);  · bladder or urinary medications such as darifenacin (Enablex), flavoxate (Urispas), oxybutynin (Ditropan, Oxytrol), tolterodine (Detrol), or solifenacin (Vesicare);  · a heart rhythm medication such as quinidine (Janay-G), procainamide (Procan, Pronestyl), disopyramide (Norpace), flecaininde (Tambocor), mexiletine (Mexitil), propafenone, (Rythmol), and others;  · irritable bowel medications such as hyoscyamine (Hyomax) or propantheline (Pro Banthine);  · an MAO inhibitor such as furazolidone (Furoxone), isocarboxazid (Marplan), phenelzine (Nardil), rasagiline (Azilect), selegiline (Eldepryl, Emsam, Zelapar), or tranylcypromine (Parnate);  · nitrate medication, such as nitroglycerin (Nitro Dur, Nitrolingual, Nitrostat, Transderm Nitro, and others), isosorbide dinitrate (Dilatrate, Isordil, Isochron), or isosorbide mononitrate (Imdur, ISMO, Monoket);  · phenothiazines such as chlorpromazine (Thorazine), fluphenazine (Permitil, Prolixin), perphenazine (Trilafon), prochlorperazine (Compazine, Compro), promethazine (Pentazine, Phenergan, Anergan, Antinaus), thioridazine (Mellaril), or trifluoperazine (Stelazine);  · steroid medication such as prednisone and others; or  · ulcer medication such as glycopyrrolate (Robinul) or mepenzolate (Cantil). This list is not complete and other drugs may interact with dicyclomine.  Tell your doctor about all medications you use. This includes prescription, over-the-counter, vitamin, and herbal products. Do not start a new medication without telling your doctor. Where can I get more information? Your pharmacist can provide more information about dicyclomine. Remember, keep this and all other medicines out of the reach of children, never share your medicines with others, and use this medication only for the indication prescribed. Every effort has been made to ensure that the information provided by [facility] is accurate, up-to-date, and complete, but no guarantee is made to that effect. Drug information contained herein may be time sensitive. [facility] information has been compiled for use by healthcare practitioners and consumers in the [place] and therefore [facility] does not warrant that uses outside of the [place] are appropriate, unless specifically indicated otherwise. [facility]'s drug information does not endorse drugs, diagnose patients or recommend therapy. [facility]'s drug information is an informational resource designed to assist licensed healthcare practitioners in caring for their patients and/or to serve consumers viewing this service as a supplement to, and not a substitute for, the expertise, skill, knowledge and judgment of healthcare practitioners. The absence of a warning for a given drug or drug combination in no way should be construed to indicate that the drug or drug combination is safe, effective or appropriate for any given patient. [facility] does not assume any responsibility for any aspect of healthcare administered with the aid of information [facility] provides. The information contained herein is not intended to cover all possible uses, directions, precautions, warnings, drug interactions, allergic reactions, or adverse effects.  If you have questions about the drugs you are taking, check with your doctor, nurse or pharmacist.  Copyright 7128-2012 [address] [place] Version: 5.01. Revision date: 11/8/2011. Care instructions adapted under license by [facility] ([facility]). If you have questions about a medical condition or this instruction, always ask your healthcare professional. [address] any warranty or liability for your use of this information. Patient Education        famotidine (oral/injection)  Pronunciation:  fam OH ti valentine  Brand:  Heartburn Relief, Leader Acid Reducer, Pepcid, Pepcid AC, Pepcid AC Maximum Strength  What is the most important information I should know about famotidine? Follow all directions on your medicine label and package. Tell each of your healthcare providers about all your medical conditions, allergies, and all medicines you use. What is famotidine? Famotidine is used to treat and prevent ulcers in the stomach and intestines. It also treats conditions in which the stomach produces too much acid, such as Zollinger-Mari syndrome. Famotidine also treats gastroesophageal reflux disease (GERD) and other conditions in which acid backs up from the stomach into the esophagus, causing heartburn. Famotidine may also be used for purposes not listed in this medication guide. What should I discuss with my healthcare provider before taking famotidine? Heartburn can mimic early symptoms of a heart attack. Get emergency medical help if you have chest pain that spreads to your jaw or shoulder and you feel anxious or light-headed. You should not use this medicine if you are allergic to famotidine or similar medicines such as ranitidine (Zantac), cimetidine (Tagamet), or nizatidine (Axid). Ask a doctor or pharmacist if this medicine is safe to use if you have:  · kidney disease;  · liver disease;  · cancer stomach; or  · long QT syndrome (in you or a family member). Ask a doctor before using this medicine if you are pregnant or breastfeeding. How should I take famotidine?   Use exactly as directed on the label, or as prescribed by your doctor. Famotidine oral is taken by mouth. Famotidine injection is given as an infusion into a vein. A healthcare provider will give you this injection if you are unable to take the medicine by mouth. You may take famotidine oral  with or without food. Measure liquid medicine carefully. Use the dosing syringe provided, or use a medicine dose-measuring device (not a kitchen spoon). Most ulcers heal within 4 weeks of famotidine treatment, but it may take up to 8 weeks of using this medicine before your ulcer heals. Keep using the medication as directed. Call your doctor if the condition you are treating with famotidine does not improve, or if it gets worse while using famotidine. Famotidine may be only part of a complete program of treatment that also includes changes in diet or lifestyle habits. Follow all instructions of your doctor or dietitian. Store at room temperature away from moisture, heat, and light. Do not allow the liquid  medicine to freeze. Throw away any unused famotidine liquid that is older than 30 days. What happens if I miss a dose? Take the medicine as soon as you can, but skip the missed dose if it is almost time for your next dose. Do not take two doses at one time. Because you will receive famotidine injection in a clinical setting, you are not likely to miss a dose. What happens if I overdose? Seek emergency medical attention or call the Poison Help line at 1-940.816.2191. What should I avoid while taking famotidine? Avoid drinking alcohol. It can increase the risk of damage to your stomach. Avoid taking other stomach acid reducers unless your doctor has told you to. However, you may take an antacid (such as Maalox, Mylanta, Gaviscon, Milk of Magnesia, Rolaids, or Tums) with famotidine. What are the possible side effects of famotidine?   Get emergency medical help if you have signs of an allergic reaction: hives; difficult breathing; swelling of your face, lips, tongue, or throat. Stop using famotidine and call your doctor at once if you have:  · confusion, hallucinations, agitation, lack of energy;  · a seizure;  · fast or pounding heartbeats, sudden dizziness (like you might pass out); or  · unexplained muscle pain, tenderness, or weakness especially if you also have fever, unusual tiredness, and dark colored urine. Some side effects may be more likely in older adults and in people who have severe kidney disease. Common side effects may include:  · headache;  · dizziness; or  · constipation or diarrhea. This is not a complete list of side effects and others may occur. Call your doctor for medical advice about side effects. You may report side effects to FDA at 9-937-DYK-3932. What other drugs will affect famotidine? Famotidine oral can make it harder for your body to absorb other medicines you take by mouth. Tell your doctor if you are taking:  · cefditoren;  · dasatinib;  · delavirdine;  · fosamprenavir; or  · tizanidine (if you are taking famotidine liquid). This list is not complete. Other drugs may affect famotidine oral or injection, including prescription and over-the-counter medicines, vitamins, and herbal products. Not all possible drug interactions are listed here. Where can I get more information? Your doctor or pharmacist can provide more information about famotidine. Remember, keep this and all other medicines out of the reach of children, never share your medicines with others, and use this medication only for the indication prescribed. Every effort has been made to ensure that the information provided by [address] is accurate, up-to-date, and complete, but no guarantee is made to that effect. Drug information contained herein may be time sensitive.  [facility] information has been compiled for use by healthcare practitioners and consumers in the [place] and therefore [facility] does not warrant that uses outside of the [place] are appropriate, unless specifically indicated otherwise. [facility] drug information does not endorse drugs, diagnose patients or recommend therapy. [facility] drug information is an informational resource designed to assist licensed healthcare practitioners in caring for their patients and/or to serve consumers viewing this service as a supplement to, and not a substitute for, the expertise, skill, knowledge and judgment of healthcare practitioners. The absence of a warning for a given drug or drug combination in no way should be construed to indicate that the drug or drug combination is safe, effective or appropriate for any given patient. [facility] does not assume any responsibility for any aspect of healthcare administered with the aid of information [facility] provides. The information contained herein is not intended to cover all possible uses, directions, precautions, warnings, drug interactions, allergic reactions, or adverse effects. If you have questions about the drugs you are taking, check with your doctor, nurse or pharmacist.  Copyright 2826-0427 [address]: .01. Revision date: 3/25/2020. Care instructions adapted under license by [facility] ([facility]). If you have questions about a medical condition or this instruction, always ask your healthcare professional. [place] [address] any warranty or liability for your use of this information.

## 2020-11-17 NOTE — PROGRESS NOTES
Office Visit  11/17/2020    Subjective:  Chief Complaint   Patient presents with    Spasms    Gastroesophageal Reflux     HPI:   Joe Gomez is a 23 y.o. male who presents to the clinic today for follow up. Low back/left leg pain-following with Dr. Cely Titus, pain management and ortho. Injections have been given. Reports his pain is resolved at this time-he is following with this provider on an as-needed basis. Muscle spasms- states his muscles are weak d/t the months of immobility from sciatica pains. Performed PT and states he does the exercises. Muscles \"throughout my body- my neck, arms, legs, stomach, back, anywhere really. \" Also, pain in hands. Only occurs at rest- states he forgets about pains when he is focused on something else. Pains have been present since birth per pt. Used tizanidine and states he did not like the effects of the medication. He no longer wants to take as needed medication for this. Abdominal spasms that occur are the worst per pt-occurring 6-7 days a week-occurs for 1-2 hours and resolves spontaneously. States that this occurs worse when he lies down. States this \"feels like acid. \" This has been present since birth per pt. Last visit, prilosec was prescribed. Pt reports prilosec helps-denies nausea and acid reflux. States that this medication can make spasms more manageable, but they are still occurring. States this medication feels too strong and he would like to decrease the dose. Constipation - resolved since last visit. No blood in stool today.      Anxiety-pt reports that he forgot to schedule. Intermittent panic attacks- 2x/month. Referred to psychology- Has not seen psychology. ADHD- Previously took medications-   Adderall.     Smoker- 0.5 ppd. Not ready to quit yet per pt. Using vape occasionally to cut down. Review of Systems   Constitutional: Negative for chills, fatigue, fever and unexpected weight change. Eyes: Negative for visual disturbance. Respiratory: Negative for cough, shortness of breath and wheezing. Cardiovascular: Negative for chest pain, palpitations and leg swelling. Gastrointestinal: Negative for abdominal pain, constipation, diarrhea, nausea and vomiting. Intermittent abdominal spasms   Musculoskeletal:        Intermittent joint and muscle pains   Skin: Negative for pallor and rash. Neurological: Negative for dizziness, weakness, light-headedness, numbness and headaches. Psychiatric/Behavioral: Negative for dysphoric mood, self-injury, sleep disturbance and suicidal ideas. The patient is not nervous/anxious. No Known Allergies    Current Outpatient Rx   Medication Sig Dispense Refill    famotidine (PEPCID) 20 MG tablet Take 1 tablet by mouth every morning (before breakfast) As needed for GERD 60 tablet 0    dicyclomine (BENTYL) 20 MG tablet Take 1 tablet by mouth 3 times daily as needed (abdominal spasms) 90 tablet 0    tiZANidine (ZANAFLEX) 2 MG tablet Take 1 tablet by mouth every 8 hours as needed (muscle spasms) (Patient not taking: Reported on 11/17/2020) 10 tablet 0     Patient Active Problem List   Diagnosis    Left sided sciatica    Anxiety     Wt Readings from Last 3 Encounters:   11/17/20 187 lb (84.8 kg)   10/20/20 194 lb (88 kg)   09/21/20 185 lb (83.9 kg)     BP Readings from Last 3 Encounters:   11/17/20 134/84   10/20/20 120/79   08/31/20 123/78     Objective/Physical Exam:  /84   Pulse 95   Temp 97.5 °F (36.4 °C) (Temporal)   Wt 187 lb (84.8 kg)   SpO2 98%   BMI 26.08 kg/m²   Body mass index is 26.08 kg/m². Physical Exam  Vitals signs reviewed. Constitutional:       General: He is not in acute distress. Appearance: He is well-developed. He is not diaphoretic. HENT:      Head: Normocephalic and atraumatic. Eyes:      Pupils: Pupils are equal, round, and reactive to light. Cardiovascular:      Rate and Rhythm: Normal rate and regular rhythm.    Pulmonary:      Effort: Pulmonary psychology. Smoker   - Cessation recommended. Return for 4-6 weeks abdominal/muscle spasms, or sooner if needed. Pt will call if symptoms worsen or fail to improve. All questions answered. Pt states no further questions or concerns at this time.    Electronically signed by: Pia Otero 11/17/20

## 2020-11-18 ENCOUNTER — TELEPHONE (OUTPATIENT)
Dept: INTERNAL MEDICINE CLINIC | Age: 20
End: 2020-11-18

## 2020-11-18 LAB — CYCLIC CITRULLINATED PEPTIDE ANTIBODY IGG: <0.5 U/ML (ref 0–2.9)

## 2020-12-15 ENCOUNTER — OFFICE VISIT (OUTPATIENT)
Dept: INTERNAL MEDICINE CLINIC | Age: 20
End: 2020-12-15
Payer: COMMERCIAL

## 2020-12-15 VITALS
WEIGHT: 182 LBS | HEART RATE: 64 BPM | DIASTOLIC BLOOD PRESSURE: 60 MMHG | SYSTOLIC BLOOD PRESSURE: 100 MMHG | BODY MASS INDEX: 25.38 KG/M2 | TEMPERATURE: 97.1 F

## 2020-12-15 PROCEDURE — G8482 FLU IMMUNIZE ORDER/ADMIN: HCPCS | Performed by: NURSE PRACTITIONER

## 2020-12-15 PROCEDURE — 4004F PT TOBACCO SCREEN RCVD TLK: CPT | Performed by: NURSE PRACTITIONER

## 2020-12-15 PROCEDURE — G8427 DOCREV CUR MEDS BY ELIG CLIN: HCPCS | Performed by: NURSE PRACTITIONER

## 2020-12-15 PROCEDURE — 99213 OFFICE O/P EST LOW 20 MIN: CPT | Performed by: NURSE PRACTITIONER

## 2020-12-15 PROCEDURE — G8419 CALC BMI OUT NRM PARAM NOF/U: HCPCS | Performed by: NURSE PRACTITIONER

## 2020-12-15 RX ORDER — DICYCLOMINE HCL 20 MG
20 TABLET ORAL 3 TIMES DAILY PRN
Qty: 90 TABLET | Refills: 0 | Status: SHIPPED | OUTPATIENT
Start: 2020-12-15 | End: 2021-06-22 | Stop reason: SDUPTHER

## 2020-12-15 RX ORDER — FAMOTIDINE 20 MG/1
20 TABLET, FILM COATED ORAL
Qty: 90 TABLET | Refills: 0 | Status: SHIPPED | OUTPATIENT
Start: 2020-12-15 | End: 2021-04-12 | Stop reason: SDUPTHER

## 2020-12-15 ASSESSMENT — ENCOUNTER SYMPTOMS
SHORTNESS OF BREATH: 0
VOMITING: 0
COUGH: 0
DIARRHEA: 0
ABDOMINAL PAIN: 0
BLOOD IN STOOL: 0
NAUSEA: 0
CONSTIPATION: 0

## 2020-12-15 NOTE — PROGRESS NOTES
Office Visit  12/15/2020    Subjective:  Chief Complaint   Patient presents with    Gastroesophageal Reflux     muscle spasms and GERD better on meds -- asking for refill     HPI:  Marissa Lozano is a 23 y.o. male who presents to the clinic today for follow up. Low back/left leg painfollowing with Dr. Ratna Chauhan, pain management and ortho. Controlled.      Muscle spasms- states these are \"all gone. \" Well controlled per pt.      Abdominal spasms- Last visit, Pepcid daily was prescribed with as needed Bentyl (uses this 1-2x/day). States this is working \"great, fantastic. \"  Denies D/C/N/V. No blood in stool today. Last BM was today.     Anxiety-pt reports that he forgot to schedule with psychology. States mood is better now that pains have improved. Denies SI/HI.     Smoker- 3 cig - 0.5 ppd. Not ready to quit yet per pt. Using vape occasionally to cut down. Review of Systems   Constitutional: Negative for appetite change, chills, fatigue and fever. Respiratory: Negative for cough and shortness of breath. Cardiovascular: Negative for chest pain. Gastrointestinal: Negative for abdominal pain, blood in stool, constipation, diarrhea, nausea and vomiting. Genitourinary: Negative for decreased urine volume, difficulty urinating, dysuria, flank pain, frequency, genital sores, hematuria and urgency. Skin: Negative for pallor. Psychiatric/Behavioral: Negative for dysphoric mood, self-injury, sleep disturbance and suicidal ideas. The patient is nervous/anxious.       No Known Allergies    Current Outpatient Rx   Medication Sig Dispense Refill    famotidine (PEPCID) 20 MG tablet Take 1 tablet by mouth every morning (before breakfast) As needed for GERD 90 tablet 0    dicyclomine (BENTYL) 20 MG tablet Take 1 tablet by mouth 3 times daily as needed (abdominal spasms) 90 tablet 0     Patient Active Problem List   Diagnosis    Left sided sciatica    Anxiety      Wt Readings from Last 3 Encounters: 12/15/20 182 lb (82.6 kg)   11/17/20 187 lb (84.8 kg)   10/20/20 194 lb (88 kg)     BP Readings from Last 3 Encounters:   12/15/20 100/60   11/17/20 134/84   10/20/20 120/79     Objective/Physical Exam:  /60   Pulse 64   Temp 97.1 °F (36.2 °C)   Wt 182 lb (82.6 kg)   BMI 25.38 kg/m²   Body mass index is 25.38 kg/m². Physical Exam  Vitals signs reviewed. Constitutional:       General: He is not in acute distress. Appearance: He is well-developed. He is not diaphoretic. HENT:      Head: Normocephalic. Mouth/Throat:      Mouth: Mucous membranes are moist.   Cardiovascular:      Rate and Rhythm: Normal rate and regular rhythm. Pulmonary:      Effort: Pulmonary effort is normal. No respiratory distress. Breath sounds: Normal breath sounds. No wheezing. Abdominal:      General: Bowel sounds are normal. There is no distension. Palpations: Abdomen is soft. There is no mass. Tenderness: There is no abdominal tenderness. There is no guarding or rebound. Genitourinary:     Comments: No CVA tenderness noted  Skin:     General: Skin is warm and dry. Coloration: Skin is not pale. Findings: No erythema or rash. Neurological:      Mental Status: He is alert and oriented to person, place, and time. Psychiatric:         Mood and Affect: Mood normal.       Assessment and Plan:  Salvatore Bonilla was seen today for gastroesophageal reflux. Diagnoses and all orders for this visit:    Left sided sciatica   - Continue with ortho. Muscle spasm   - Resolved. Abdominal spasms  -     Well controlled on the current regimen. Denies side effects.   - Continue current regimen.  - famotidine (PEPCID) 20 MG tablet; Take 1 tablet by mouth every morning (before breakfast) As needed for GERD - refilled today  -     dicyclomine (BENTYL) 20 MG tablet;  Take 1 tablet by mouth 3 times daily as needed (abdominal spasms)- refilled today    Anxiety - Recommend pt schedule with psychology as previously discussed. Pt agreeable. Smoker   - Cessation recommended. Return in about 3 months (around 3/15/2021) for abdominal spasm f/u, or sooner if needed. Pt will call if symptoms worsen or fail to improve. All questions answered. Pt states no further questions or concerns at this time.    Electronically signed by: Priti Shah 12/15/20

## 2021-01-04 ENCOUNTER — VIRTUAL VISIT (OUTPATIENT)
Dept: PSYCHOLOGY | Age: 21
End: 2021-01-04
Payer: COMMERCIAL

## 2021-01-04 DIAGNOSIS — F41.0 PANIC ATTACK: Primary | ICD-10-CM

## 2021-01-04 PROCEDURE — 90791 PSYCH DIAGNOSTIC EVALUATION: CPT | Performed by: PSYCHOLOGIST

## 2021-01-04 NOTE — PROGRESS NOTES
Behavioral Health Consultation  Alysha Quan, Ph.D.  Clinical Psychologist  1/4/2021  10:16 AM      Time spent with Patient: 30 minutes  This is patient's first [facility] appointment. Reason for Consult:  anxiety    Pt provided informed consent for the behavioral health program. Discussed with patient model of service to include the limits of confidentiality (i.e. abuse reporting, suicide intervention, etc.) and short-term intervention focused approach. Pt indicated understanding. Feedback given to PCP. Pursuant to the emergency declaration under the [address] [place] [address], [facility] waiver authority and the [employer] Act, this Virtual Visit was conducted, with patient's consent, to reduce the patient's risk of exposure to COVID-19 and provide continuity of care for an established patient. Services were provided through a video synchronous discussion virtually to substitute for in-person clinic visit. Pt gave verbal informed consent to participate in telehealth services. Conducted a risk-benefit analysis and determined that the patient's presenting problems are consistent with the use of telepsychology. Determined that the patient has sufficient knowledge and skills in the use of technology enabling them to adequately benefit from telepsychology. It was determined that this patient was able to be properly treated without an in-person session. Patient verified that they were currently located at the [facility] address that was provided during registration.     Verified the following information:  Patient's identification: Yes  Patient location: [address] [place] [address]   Patient's call back number: [phone]   Patient's emergency contact's name and number, as well as permission to contact them if needed: Extended Emergency Contact Information  Primary Emergency Contact: Shadia Rodas  Address: [address] [facility] [address] [place], [address] [facility]  Home Phone: [phone]  Mobile Phone: [phone]  Relation: Brother/Sister  Secondary Emergency Contact: Karen Walden  Address: [address], [address] Phone: [phone]  Mobile Phone: [phone]  Relation: Parent     Provider location: [place], [place]    S:  Pt seen per PCP re: panic attacks and ADHD. Visit began 13 mins late d/t pt oversleeping. Then experienced technology issues, so visit was abbreviated. Pt reported sxs of panic attack, including heart palpitations, trembling, SOB, chocking sensation, chest pain, nausea, dizziness, chills/hot flushes, paresthesias, derealization/depersonalization, fear of losing control, fear of dying. Stated he will rarely close his eyes and an image of a person or animal will come into his mind and then that picture will expand \"like a balloon\" and start spinning, pt will feel dizzy. Stated he has not been sleeping well \"I want to clean my home all the time. \" Later noted he gets 12-14 hrs/night and 8 hrs on work nights. Stated other days he will be awake most of the night bc he wants to clean. Stated this began 2 mos ago when he moved into his own trailer, the first time he has lived alone. Formerly would have this desire to clean excessively about 1x every 3 mos. Stated he is not generally a tidy person. Stated during this time his thoughts race during this time. Noted his self-esteem has improved lately, but not to the level of grandiosity. Wants to talk about things he's come to terms w from his past, but not today. 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 His brother and nephew lives next door and pt visits 3x/wk. Most of his social support is through his online community through [employer], hasn't been inocencio much. Formerly smoked marijuana, but stopped bc it made anxiety worse. Smokes about 10 cig/day. Had gotten it down to 4/day, but had money to buy more. Caffeine: Drinks a Monster Tea and 1 can or bottle of caffeinnated soda/day. Provided psychoeducation on relationship between caffeine and anxiety. Pt agreed to consider reduction. O:  MSE:    Appearance    alert, cooperative  Impulsive behavior No  Speech    spontaneous, normal rate, normal volume and well articulated  Mood    Anxious  Affect    anxiety  Thought Content    intact and cognitive distortions  Thought Process    goal directed and coherent  Associations    logical connections  Insight    Fair  Judgment    Intact  Orientation    oriented to person, place, time, and general circumstances  Memory    recent and remote memory intact  Attention/Concentration    impaired  Morbid ideation No  Suicide Assessment    no suicidal ideation    History:    Social History:   Social History     Socioeconomic History    Marital status: Single     Spouse name: Not on file    Number of children: Not on file    Years of education: Not on file    Highest education level: Not on file   Occupational History    Not on file   Social Needs    Financial resource strain: Not on file    Food insecurity     Worry: Not on file     Inability: Not on file   [employer] needs     Medical: Not on file     Non-medical: Not on file   Tobacco Use    Smoking status: Current Every Day Smoker     Packs/day: 0.50     Types: Cigarettes    Smokeless tobacco: Never Used   Substance and Sexual Activity    Alcohol use: Yes     Comment: every three months    Drug use: Not Currently     Comment: pt reports he smokes lavendar.      Sexual activity: Not on file   Lifestyle    Physical activity     Days per week: Not on file     Minutes per session: Not on file    Stress: Not on file   Relationships    Social connections     Talks on phone: Not on file     Gets together: Not on file     Attends Sikhism service: Not on file

## 2021-02-04 ENCOUNTER — NURSE TRIAGE (OUTPATIENT)
Dept: OTHER | Facility: CLINIC | Age: 21
End: 2021-02-04

## 2021-02-04 ENCOUNTER — HOSPITAL ENCOUNTER (EMERGENCY)
Age: 21
Discharge: HOME | End: 2021-02-04
Attending: EMERGENCY MEDICINE
Payer: COMMERCIAL

## 2021-02-04 VITALS
HEIGHT: 71 IN | DIASTOLIC BLOOD PRESSURE: 68 MMHG | BODY MASS INDEX: 25.84 KG/M2 | OXYGEN SATURATION: 97 % | TEMPERATURE: 98.9 F | WEIGHT: 184.56 LBS | HEART RATE: 77 BPM | SYSTOLIC BLOOD PRESSURE: 119 MMHG | RESPIRATION RATE: 16 BRPM

## 2021-02-04 DIAGNOSIS — M54.16 LUMBAR RADICULOPATHY: Primary | ICD-10-CM

## 2021-02-04 PROCEDURE — 6370000000 HC RX 637 (ALT 250 FOR IP): Performed by: EMERGENCY MEDICINE

## 2021-02-04 PROCEDURE — 6360000002 HC RX W HCPCS: Performed by: EMERGENCY MEDICINE

## 2021-02-04 PROCEDURE — 99283 EMERGENCY DEPT VISIT LOW MDM: CPT

## 2021-02-04 PROCEDURE — 96372 THER/PROPH/DIAG INJ SC/IM: CPT

## 2021-02-04 RX ORDER — KETOROLAC TROMETHAMINE 30 MG/ML
15 INJECTION, SOLUTION INTRAMUSCULAR; INTRAVENOUS ONCE
Status: COMPLETED | OUTPATIENT
Start: 2021-02-04 | End: 2021-02-04

## 2021-02-04 RX ORDER — METHOCARBAMOL 750 MG/1
750-1500 TABLET, FILM COATED ORAL EVERY 8 HOURS PRN
Qty: 30 TABLET | Refills: 0 | Status: SHIPPED | OUTPATIENT
Start: 2021-02-04 | End: 2021-02-14

## 2021-02-04 RX ORDER — NAPROXEN 500 MG/1
500 TABLET ORAL 2 TIMES DAILY WITH MEALS
Qty: 20 TABLET | Refills: 0 | Status: SHIPPED | OUTPATIENT
Start: 2021-02-04 | End: 2021-03-16

## 2021-02-04 RX ORDER — ORPHENADRINE CITRATE 30 MG/ML
60 INJECTION INTRAMUSCULAR; INTRAVENOUS ONCE
Status: COMPLETED | OUTPATIENT
Start: 2021-02-04 | End: 2021-02-04

## 2021-02-04 RX ORDER — DEXAMETHASONE SODIUM PHOSPHATE 10 MG/ML
10 INJECTION, SOLUTION INTRAMUSCULAR; INTRAVENOUS ONCE
Status: COMPLETED | OUTPATIENT
Start: 2021-02-04 | End: 2021-02-04

## 2021-02-04 RX ORDER — ACETAMINOPHEN 325 MG/1
650 TABLET ORAL ONCE
Status: COMPLETED | OUTPATIENT
Start: 2021-02-04 | End: 2021-02-04

## 2021-02-04 RX ADMIN — ACETAMINOPHEN 650 MG: 325 TABLET ORAL at 14:50

## 2021-02-04 RX ADMIN — ORPHENADRINE CITRATE 60 MG: 30 INJECTION INTRAMUSCULAR; INTRAVENOUS at 14:59

## 2021-02-04 RX ADMIN — KETOROLAC TROMETHAMINE 15 MG: 30 INJECTION, SOLUTION INTRAMUSCULAR at 14:59

## 2021-02-04 RX ADMIN — DEXAMETHASONE SODIUM PHOSPHATE 10 MG: 10 INJECTION, SOLUTION INTRAMUSCULAR; INTRAVENOUS at 14:50

## 2021-02-04 ASSESSMENT — PAIN SCALES - GENERAL
PAINLEVEL_OUTOF10: 4
PAINLEVEL_OUTOF10: 5

## 2021-02-04 NOTE — ED PROVIDER NOTES
EMERGENCY DEPARTMENT PROVIDER NOTE    Patient Identification  Pt Name: Carl Yuan  MRN: [id number]  Art[place]fgerson 2000  Date of evaluation: 2/4/2021  Provider: Mario Mccartney DO  PCP: RADHA Neri CNP    Chief Complaint  Back Pain (c/o left sided back pain x 2 weeks. had trouble walking this morning. hx of sciatica)      HPI  (History provided by patient)  This is a 23 y.o. male with pertinent past medical history of chronic low back pain and sciatica who was brought in by friend for left sided low back and leg pain worsening over the past 2 weeks. Patient states this morning woke up and his leg pain was acutely worse and his leg felt weak. Has been having difficulty walking. Pain worsened with movement, nothing seems to make it better. 9/10 in severity. Denies any fevers, chills, numbness, tingling or  symptoms. No trauma. Denies IVDA. ROS  . Const:  No fevers, no chills, no generalized weakness  Skin:  No rash, no lesions  Card:  No chest pain, no palpitations, no edema  Resp:  No shortness of breath, no cough, no wheezing  Abd:  No abdominal pain, no nausea, no vomiting, no diarrhea  Genitourinary:  No dysuria, no hematuria, no urinary retention or incontinence  MSK:  +low back pain, no myalgia  Neuro:  +focal weakness, no headache, no paresthesia    All other systems reviewed and negative unless otherwise noted in HPI        I have reviewed the following nursing documentation:  Allergies: Patient has no known allergies.     Past medical history:   Past Medical History:   Diagnosis Date    Anxiety     Sciatica, left side     left leg     Past surgical history:   Past Surgical History:   Procedure Laterality Date    PAIN MANAGEMENT PROCEDURE Left 8/31/2020    LEFT L5 AND S1 TRANSFORAMINAL EPIDURAL STEROID INJECTION performed by Jony Chin MD at [address] [place] [address] medications:   Discharge Medication List as of 2/4/2021  4:00 PM CONTINUE these medications which have NOT CHANGED    Details   famotidine (PEPCID) 20 MG tablet Take 1 tablet by mouth every morning (before breakfast) As needed for GERD, Disp-90 tablet, R-0Normal      dicyclomine (BENTYL) 20 MG tablet Take 1 tablet by mouth 3 times daily as needed (abdominal spasms), Disp-90 tablet, R-0Normal             Social history:  reports that he has been smoking cigarettes. He has been smoking about 0.50 packs per day. He has never used smokeless tobacco. He reports current alcohol use. He reports previous drug use. Family history:    Family History   Problem Relation Age of Onset    Depression Mother     Anemia Mother     High Blood Pressure Mother     Alcohol Abuse Mother     Alcohol Abuse Father     Prostate Cancer Neg Hx     Heart Attack Neg Hx     Stroke Neg Hx          Exam  ED Triage Vitals [02/04/21 1333]   BP Temp Temp Source Pulse Resp SpO2 Height Weight   102/73 98.9 °F (37.2 °C) Oral 85 18 97 % 5' 11\" (1.803 m) 184 lb 9 oz (83.7 kg)     Nursing note and vitals reviewed. Constitutional: Well developed, well nourished. Non-toxic in appearance. HENT:      Head: Normocephalic and atraumatic. Ears: External ears normal.      Nose: Nose normal.     Mouth: Membrane mucosa moist and pink. Eyes: Anicteric sclera. No discharge. Neck: Supple. Trachea midline. Cardiovascular: RRR; no murmurs, rubs, or gallops. DP 2+ and symmetric  Pulmonary/Chest: Effort normal. No respiratory distress. CTAB. No stridor. No wheezes. No rales. Abdominal: Soft. No distension.  Nontender to deep palpation all quadants  Rectal: Normal rectal tone, nontender, no masses or lesions, Elzbieta RASHID as chaperone Musculoskeletal: Moves all extremities. No gross deformity. Tenderness to palpation left lumbar paraspinals and overlying SI joint, no focal midline tenderness, no overlying skin changes. Neurological: Alert and oriented. Face symmetric. Speech is clear. 4/5 motor in flexion left hip limited by pain, 5/5 motor remainder of left extremity and RLE. Sensation intact BLE's. Skin: Warm and dry. No rash. Psychiatric: Normal mood and affect. Behavior is normal.    Procedures        Radiology  No orders to display       Labs  No results found for this visit on 02/04/21. Screenings           MDM and ED Course    Patient afebrile and nontoxic. No distress. No findings on exam to suggest skin or soft tissue infection. No midline tenderness, no trauma, no indication for acute imaging. No risk factors or other symptoms to suggest SEA and clinical suspicion is low. Patient neurovascularly intact aside from mild weakness in left hip flexion which seems more secondary to back pain. Rectal tone is normal. I have low suspicion for cauda equina. Patient felt improved after NSAIDs, decadron and muscle relaxant, after which was able to ambulate. Suspect acute on chronic lumbar radiculopathy/sciatica. Patient felt safe for discharge to self care with close PCP follow up and he is agreeable. Strict return precautions were discussed. Final Impression  1. Lumbar radiculopathy        Blood pressure 119/68, pulse 77, temperature 98.9 °F (37.2 °C), temperature source Oral, resp. rate 16, height 5' 11\" (1.803 m), weight 184 lb 9 oz (83.7 kg), SpO2 97 %.      Disposition:  DISPOSITION Decision To Discharge 02/04/2021 03:57:10 PM      Patient Referrals:  RADHA Espinoza CNP  [address] [id number] [address]  [phone]    In 2 days        Discharge Medications:  Discharge Medication List as of 2/4/2021  4:00 PM      START taking these medications    Details methocarbamol (ROBAXIN-750) 750 MG tablet Take 1-2 tablets by mouth every 8 hours as needed (muscle cramps or pain), Disp-30 tablet, R-0Print      naproxen (NAPROSYN) 500 MG tablet Take 1 tablet by mouth 2 times daily (with meals) for 10 days, Disp-20 tablet, R-0Print             Discontinued Medications:  Discharge Medication List as of 2/4/2021  4:00 PM          This chart was generated using the [address] [place] [address] [employer] system. I created this record but it may contain dictation errors given the limitations of this technology.     Chiquita Orozco DO (electronically signed)  Attending Emergency Physician       Chiquita Orozco DO  02/05/21 1891

## 2021-02-04 NOTE — PROGRESS NOTES
Office Visit   2/5/2021    Subjective:  Chief Complaint   Patient presents with    Follow-up     leg getting better, can stand today      HPI:  Sahara Good is a 23 y.o. male who presents to the clinic today for follow up. Patient was seen in the emergency room for left-sided low back pain for 2 weeks and he woke up yesterday morning with acute worsening of the pain and weakness in his bilateral lower extremities. Patient was given naproxen with as needed muscle relaxants and patient reports that the pain is improving. States he is able to ambulate today. States the numbness in the right leg resolved. The left leg still has some numbness, but is significantly better than yesterday. Low back pain present for 2 weeks. States Dr. Cathy Guzman left and he needs a new orthopedic. Review of Systems   Constitutional: Negative for chills, fatigue, fever and unexpected weight change. Eyes: Negative for visual disturbance. Respiratory: Negative for cough, shortness of breath and wheezing. Cardiovascular: Negative for chest pain, palpitations and leg swelling. Gastrointestinal: Negative for abdominal pain, constipation, diarrhea, nausea and vomiting. Musculoskeletal: Positive for back pain. Left leg numbness improving   Skin: Negative for pallor and rash. Neurological: Negative for dizziness, tremors, seizures, syncope, facial asymmetry, speech difficulty, weakness, light-headedness and headaches.      No Known Allergies    Current Outpatient Rx   Medication Sig Dispense Refill    methocarbamol (ROBAXIN-750) 750 MG tablet Take 1-2 tablets by mouth every 8 hours as needed (muscle cramps or pain) 30 tablet 0    naproxen (NAPROSYN) 500 MG tablet Take 1 tablet by mouth 2 times daily (with meals) for 10 days 20 tablet 0    famotidine (PEPCID) 20 MG tablet Take 1 tablet by mouth every morning (before breakfast) As needed for GERD 90 tablet 0  dicyclomine (BENTYL) 20 MG tablet Take 1 tablet by mouth 3 times daily as needed (abdominal spasms) 90 tablet 0     Patient Active Problem List   Diagnosis    Left sided sciatica    Anxiety      Wt Readings from Last 3 Encounters:   02/05/21 185 lb (83.9 kg)   02/04/21 184 lb 9 oz (83.7 kg)   12/15/20 182 lb (82.6 kg)     BP Readings from Last 3 Encounters:   02/05/21 128/84   02/04/21 119/68   12/15/20 100/60     PHQ-9 Total Score: 0 (2/5/2021  3:03 PM)    Objective/Physical Exam:  /84   Pulse 94   Temp 97.3 °F (36.3 °C) (Temporal)   Wt 185 lb (83.9 kg)   SpO2 98%   BMI 25.80 kg/m²   Body mass index is 25.8 kg/m². Physical Exam  Vitals signs reviewed. Constitutional:       General: He is not in acute distress. Appearance: He is well-developed. He is not diaphoretic. HENT:      Head: Normocephalic and atraumatic. Eyes:      Pupils: Pupils are equal, round, and reactive to light. Cardiovascular:      Rate and Rhythm: Normal rate and regular rhythm. Pulmonary:      Effort: Pulmonary effort is normal. No respiratory distress. Breath sounds: Normal breath sounds. No wheezing or rales. Chest:      Chest wall: No tenderness. Abdominal:      General: Bowel sounds are normal.   Skin:     General: Skin is warm and dry. Coloration: Skin is not pale. Findings: No erythema or rash. Neurological:      General: No focal deficit present. Mental Status: He is alert and oriented to person, place, and time. Cranial Nerves: No cranial nerve deficit. Sensory: No sensory deficit. Motor: No weakness. Coordination: Coordination normal.      Gait: Gait normal.   Psychiatric:         Mood and Affect: Mood normal.       Assessment and Plan:  Neda Messina was seen today for follow-up. Diagnoses and all orders for this visit:    Left sided sciatica/Left leg numbness  -    Neuro exam normal today. Ambulation normal.  Strength equal bilaterally. - Evaluated in the emergency room yesterday. Patient reports symptoms are improving significantly with anti-inflammatories and as needed muscle relaxants. - He states that he was seeing a back specialist and this provider left [facility]. He would like a new back specialist.  - Recommended an EMG due to left lower extremity numbness. Pt agreeable. - Moe Goldstein MD, Orthopedic Surgery, [facility]  -     EMG-left lower extremity  - Patient will call symptoms worsen or fail to improve  - Red flag warning signs reviewed with the patient he will go to the ER if these occur. Strict protocol reviewed. Return for as previously scheduled or sooner if needed. Pt will call if symptoms worsen or fail to improve. All questions answered. Pt states no further questions or concerns at this time.    Electronically signed by: Niecy Garcia 02/05/21

## 2021-02-04 NOTE — TELEPHONE ENCOUNTER
Reason for Disposition   Weakness of a leg or foot (e.g., unable to bear weight, dragging foot)    Answer Assessment - Initial Assessment Questions  1. ONSET: \"When did the pain begin? \"       2 weeks ago started with mild back pain and this morning could not walk    2. LOCATION: \"Where does it hurt? \" (upper, mid or lower back)      Lower back    3. SEVERITY: \"How bad is the pain? \"  (e.g., Scale 1-10; mild, moderate, or severe)    - MILD (1-3): doesn't interfere with normal activities     - MODERATE (4-7): interferes with normal activities or awakens from sleep     - SEVERE (8-10): excruciating pain, unable to do any normal activities       6-7 pulsing pain    4. PATTERN: \"Is the pain constant? \" (e.g., yes, no; constant, intermittent)       Constant, unable to straighten back    5. RADIATION: \"Does the pain shoot into your legs or elsewhere? \"      Both legs but worse in left leg    6. CAUSE:  \"What do you think is causing the back pain? \"       Has sciatica on left side    7. BACK OVERUSE:  Majel Hawking recent lifting of heavy objects, strenuous work or exercise? \"      No, works in [employer], doesn't lift more than 20lbs    8. MEDICATIONS: \"What have you taken so far for the pain? \" (e.g., nothing, acetaminophen, NSAIDS)      Nothing    9. NEUROLOGIC SYMPTOMS: \"Do you have any weakness, numbness, or problems with bowel/bladder control? \"      Legs feel numb and hurt     10. OTHER SYMPTOMS: \"Do you have any other symptoms? \" (e.g., fever, abdominal pain, burning with urination, blood in urine)        Abdominal pain b/c hasn't taken stomach meds yet    11. PREGNANCY: \"Is there any chance you are pregnant? \" (e.g., yes, no; LMP)        No    Protocols used: BACK PAIN-ADULT-[place]    Patient called Kristina at [facility])  with red flag complaint. Brief description of triage: hx of sciatica. Woke up today in pain and could not walk.      Triage indicates for patient to go to ED (or 2nd level triage, but caller refuses), caller states that he is going to ED, but wants to schedule a f/u appt. Care advice provided, patient verbalizes understanding; denies any other questions or concerns; instructed to call back for any new or worsening symptoms. Writer provided warm transfer to [facility] for appointment scheduling. Attention Provider: Thank you for allowing me to participate in the care of your patient. The patient was connected to triage in response to information provided to the ECC. Please do not respond through this encounter as the response is not directed to a shared pool.

## 2021-02-04 NOTE — ED NOTES
Patient discharged at this time in no acute distress after verbalizing understanding of discharge instructions and need for follow up with PCP .   Pt left ambulatory to discharge area after reviewing and receiving copy of ov AVS.   Patient education  Learner- Patient   Motivation and readiness to learn- Medium to High  Barriers to learning- None  Learning preference/provided instructions- Both written and verbal discharge instructions     Lady Lázaro RN  02/04/21 3421

## 2021-02-04 NOTE — LETTER
[facility] Emergency Department      [address] [place] [address], [address]            PROOF OF PRESENCE      To Whom It May Concern:    Derek Medina  was present in the Emergency Department at [facility] on 2/4/2021.                                      Sincerely,        Hilary Aponte RN

## 2021-02-05 ENCOUNTER — OFFICE VISIT (OUTPATIENT)
Dept: INTERNAL MEDICINE CLINIC | Age: 21
End: 2021-02-05
Payer: COMMERCIAL

## 2021-02-05 VITALS
WEIGHT: 185 LBS | BODY MASS INDEX: 25.8 KG/M2 | OXYGEN SATURATION: 98 % | HEART RATE: 94 BPM | SYSTOLIC BLOOD PRESSURE: 128 MMHG | TEMPERATURE: 97.3 F | DIASTOLIC BLOOD PRESSURE: 84 MMHG

## 2021-02-05 DIAGNOSIS — R20.0 LEFT LEG NUMBNESS: ICD-10-CM

## 2021-02-05 DIAGNOSIS — M54.32 LEFT SIDED SCIATICA: Primary | ICD-10-CM

## 2021-02-05 PROCEDURE — 99213 OFFICE O/P EST LOW 20 MIN: CPT | Performed by: NURSE PRACTITIONER

## 2021-02-05 PROCEDURE — G8419 CALC BMI OUT NRM PARAM NOF/U: HCPCS | Performed by: NURSE PRACTITIONER

## 2021-02-05 PROCEDURE — G8427 DOCREV CUR MEDS BY ELIG CLIN: HCPCS | Performed by: NURSE PRACTITIONER

## 2021-02-05 PROCEDURE — 4004F PT TOBACCO SCREEN RCVD TLK: CPT | Performed by: NURSE PRACTITIONER

## 2021-02-05 PROCEDURE — G8482 FLU IMMUNIZE ORDER/ADMIN: HCPCS | Performed by: NURSE PRACTITIONER

## 2021-02-05 ASSESSMENT — ENCOUNTER SYMPTOMS
COUGH: 0
NAUSEA: 0
WHEEZING: 0
BACK PAIN: 1
VOMITING: 0
ABDOMINAL PAIN: 0
CONSTIPATION: 0
SHORTNESS OF BREATH: 0
DIARRHEA: 0

## 2021-02-05 ASSESSMENT — PATIENT HEALTH QUESTIONNAIRE - PHQ9: SUM OF ALL RESPONSES TO PHQ QUESTIONS 1-9: 0

## 2021-02-17 ENCOUNTER — TELEPHONE (OUTPATIENT)
Dept: ORTHOPEDIC SURGERY | Age: 21
End: 2021-02-17

## 2021-02-17 NOTE — TELEPHONE ENCOUNTER
Appointment Request     Patient requesting earlier appointment: Yes  Appointment offered to patient: Yes  Patient Contact Number: [phone]

## 2021-03-02 ENCOUNTER — HOSPITAL ENCOUNTER (OUTPATIENT)
Dept: NEUROLOGY | Age: 21
Discharge: HOME | End: 2021-03-02
Payer: COMMERCIAL

## 2021-03-02 PROCEDURE — 95908 NRV CNDJ TST 3-4 STUDIES: CPT | Performed by: PSYCHIATRY & NEUROLOGY

## 2021-03-02 PROCEDURE — 95886 MUSC TEST DONE W/N TEST COMP: CPT

## 2021-03-02 PROCEDURE — 95908 NRV CNDJ TST 3-4 STUDIES: CPT

## 2021-03-02 PROCEDURE — 95886 MUSC TEST DONE W/N TEST COMP: CPT | Performed by: PSYCHIATRY & NEUROLOGY

## 2021-03-02 NOTE — PROCEDURES
[facility]                     350 Located within [facility], [address]                             ELECTROMYOGRAM REPORT    PATIENT NAME: Pieter Adams                    :        2000  MED REC NO:   [id number]                          ROOM:  ACCOUNT NO:   [de-identified]                           ADMIT DATE: 2021  PROVIDER:     Selena Gaona MD    DATE OF EM2021    REASON FOR EMG:  Left leg numbness, low-back pain, rule out  radiculopathy. SUMMARY:  The left peroneal motor had a borderline amplitude. The left  posterior tibial motor and sural sensory nerve studies were normal.   Needle EMG of several muscles in the left lower extremity including the  lumbar paraspinal muscles was normal.    EMG DIAGNOSIS:  This is a normal EMG and nerve conduction study of the  left lower extremity. There is no electrophysiological evidence for  neuropathy or radiculopathy in this study.         Omid Leyva MD    D: 2021 13:42:12       T: 2021 13:48:43     [id number]  Job#: [id number]     Doc#: [id number]    CC:

## 2021-03-08 ENCOUNTER — VIRTUAL VISIT (OUTPATIENT)
Dept: PSYCHOLOGY | Age: 21
End: 2021-03-08
Payer: COMMERCIAL

## 2021-03-08 DIAGNOSIS — F41.0 PANIC ATTACK: Primary | ICD-10-CM

## 2021-03-08 PROCEDURE — 90832 PSYTX W PT 30 MINUTES: CPT | Performed by: PSYCHOLOGIST

## 2021-03-08 NOTE — PROGRESS NOTES
TELEHEALTH VISIT -- Audio/Visual (During KXJPU-99 public health emergency)  }  Pursuant to the emergency declaration under the [address] [place] [address] [facility] authority and the [employer] Act, this Virtual Visit was conducted, with patient's consent, to reduce the patient's risk of exposure to COVID-19 and provide continuity of care for an established patient. Services were provided through a video synchronous discussion virtually to substitute for in-person clinic visit. Pt gave verbal informed consent to participate in telehealth services. Conducted a risk-benefit analysis and determined that the patient's presenting problems are consistent with the use of telepsychology. Determined that the patient has sufficient knowledge and skills in the use of technology enabling them to adequately benefit from telepsychology. It was determined that this patient was able to be properly treated without an in-person session. Patient verified that they were currently located at the [facility] address that was provided during registration or another [facility] address, if noted below.     Verified the following information:  Patient's identification: Yes  Patient location: [address] [place] [address]   Patient's call back number: [phone]   Patient's emergency contact's name and number, as well as permission to contact them if needed: Extended Emergency Contact Information  Primary Emergency Contact: Cele Rodas  Address: [address] Phone: [phone]  Mobile Phone: [phone]  Relation: Parent  Secondary Emergency Contact: Ervin Joaquin  Address: [address]           [address]  Home Phone: [phone]  Mobile Phone: [phone]  Relation: Brother/Sister     Provider location: [place], [address] Consultation Sam Valentin, Ph.D.  Psychologist  3/8/2021  11:32 AM      Time spent with Patient: 28 minutes  This is patient's second  [facility] appointment. Reason for Consult:    Chief Complaint   Patient presents with    Stress       Feedback given to PCP. S:  Pt seen for f/u of anxiety. Pt reported increased anxiety 2/2 increased sciatic nerve pain. Frustration w waiting for orthopedic appt while waiting on mom's scheduling. Frustrated that he cannot keep his house as clean as he would like. Has been trying to quit smoking for past 3 wks and noted anxiety is increased. Has been cutting back on cigarettes (down to 3/day from 1ppd), replacing w vaping. Handling increased anxiety w work, video games, and talking to friends. Having panic attacks about every 5 days and \"lesser ones\" in btwn. Frequently occur before work, work is stressful right now d/t it being [place]. He has his temps and a vehicle, but not licensed and doesn't have insurance. Wants to take his test when he feels ready, but isn't there yet. Stated he is very tired today bc he was up very early to play video games. Struggled to answer questions and he attributes this to fatigue. Typically sleeps 8 hrs.      O:  MSE:    Appearance    cooperative  Appetite normal  Sleep disturbance No  Fatigue No  Loss of pleasure No  Impulsive behavior No  Speech    spontaneous, normal rate and normal volume  Mood    Anxious  Affect    normal affect  Thought Content    intact  Thought Process    goal directed and coherent  Associations    logical connections  Insight    Fair  Judgment    Intact  Orientation    oriented to person, place, time, and general circumstances  Memory    recent and remote memory intact  Attention/Concentration    impaired  Morbid ideation No  Suicide Assessment    no suicidal ideation    History:  Social History:   Social History     Socioeconomic History    Marital status: Single     Spouse name: Not on file    Number of children: Not on file  Years of education: Not on file    Highest education level: Not on file   Occupational History    Not on file   Social Needs    Financial resource strain: Not on file    Food insecurity     Worry: Not on file     Inability: Not on file    Transportation needs     Medical: Not on file     Non-medical: Not on file   Tobacco Use    Smoking status: Current Every Day Smoker     Packs/day: 0.50     Types: Cigarettes    Smokeless tobacco: Never Used   Substance and Sexual Activity    Alcohol use: Yes     Comment: every three months    Drug use: Not Currently     Comment: pt reports he smokes lavendar.  Sexual activity: Not on file   Lifestyle    Physical activity     Days per week: Not on file     Minutes per session: Not on file    Stress: Not on file   Relationships    Social connections     Talks on phone: Not on file     Gets together: Not on file     Attends Zoroastrianism service: Not on file     Active member of club or organization: Not on file     Attends meetings of clubs or organizations: Not on file     Relationship status: Not on file    Intimate partner violence     Fear of current or ex partner: Not on file     Emotionally abused: Not on file     Physically abused: Not on file     Forced sexual activity: Not on file   Other Topics Concern    Not on file   Social History Narrative    Mom lives nearby. 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 Enjoys video games and computers. Reports: \"I am kind of a hermit. \"     TOBACCO:   reports that he has been smoking cigarettes. He has been smoking about 0.50 packs per day. He has never used smokeless tobacco.  ETOH:   reports current alcohol use. Diagnosis:  Panic Attack    Plan:  Pt interventions:  Discussed self-care (sleep, nutrition, rewarding activities, social support, exercise) and Supportive techniques        Documentation was done using voice recognition dragon software.   Every effort was made to ensure accuracy; however, inadvertent, unintentional computerized transcription errors may be present.

## 2021-03-15 NOTE — PROGRESS NOTES
184 lb 9 oz (83.7 kg)     BP Readings from Last 3 Encounters:   03/16/21 120/76   02/05/21 128/84   02/04/21 119/68     Objective/Physical Exam:  /76 (Site: Left Upper Arm, Position: Sitting, Cuff Size: Large Adult)   Pulse 88   Temp 97.3 °F (36.3 °C) (Temporal)   Resp 16   Ht 5' 11\" (1.803 m)   Wt 196 lb 3.2 oz (89 kg)   SpO2 98%   BMI 27.36 kg/m²   Body mass index is 27.36 kg/m². Physical Exam  Vitals signs reviewed. Constitutional:       General: He is not in acute distress. Appearance: He is well-developed. He is not diaphoretic. HENT:      Head: Normocephalic and atraumatic. Eyes:      Pupils: Pupils are equal, round, and reactive to light. Cardiovascular:      Rate and Rhythm: Normal rate and regular rhythm. Pulmonary:      Effort: Pulmonary effort is normal. No respiratory distress. Breath sounds: Normal breath sounds. No wheezing or rales. Chest:      Chest wall: No tenderness. Abdominal:      General: Bowel sounds are normal.      Palpations: Abdomen is soft. Skin:     General: Skin is warm and dry. Neurological:      Mental Status: He is alert and oriented to person, place, and time. Coordination: Coordination normal.   Psychiatric:         Mood and Affect: Mood normal.       Assessment and Plan:  Albino Courser was seen today for follow-up. Diagnoses and all orders for this visit:    Left sided sciatica   - EMG results reviewed. - Pains stable. - Recommended pt schedule with spine surgeon.   - Patient reports that naproxen from the emergency room was helping with pains PRN. He would like a refill until he can see the spine surgeon.   - Red flag warning signs reviewed with the patient and he will go to the ER if these occur    Anxiety   - Continue with psychology. Smoker   - Vape cessation recommended. Offered a 6-month follow-up. Patient requesting a 3-month follow-up  Return in about 3 months (around 6/16/2021) for back pain f/u, or sooner if needed.  Pt will call if symptoms worsen or fail to improve. All questions answered. Pt states no further questions or concerns at this time.    Electronically signed by: Leigh Ann Molina 03/16/21

## 2021-03-16 ENCOUNTER — OFFICE VISIT (OUTPATIENT)
Dept: INTERNAL MEDICINE CLINIC | Age: 21
End: 2021-03-16
Payer: COMMERCIAL

## 2021-03-16 VITALS
RESPIRATION RATE: 16 BRPM | BODY MASS INDEX: 27.47 KG/M2 | DIASTOLIC BLOOD PRESSURE: 76 MMHG | HEIGHT: 71 IN | HEART RATE: 88 BPM | OXYGEN SATURATION: 98 % | WEIGHT: 196.2 LBS | TEMPERATURE: 97.3 F | SYSTOLIC BLOOD PRESSURE: 120 MMHG

## 2021-03-16 DIAGNOSIS — F41.9 ANXIETY: ICD-10-CM

## 2021-03-16 DIAGNOSIS — F17.200 SMOKER: ICD-10-CM

## 2021-03-16 DIAGNOSIS — M54.32 LEFT SIDED SCIATICA: Primary | ICD-10-CM

## 2021-03-16 PROCEDURE — 99213 OFFICE O/P EST LOW 20 MIN: CPT | Performed by: NURSE PRACTITIONER

## 2021-03-16 PROCEDURE — 4004F PT TOBACCO SCREEN RCVD TLK: CPT | Performed by: NURSE PRACTITIONER

## 2021-03-16 PROCEDURE — G8482 FLU IMMUNIZE ORDER/ADMIN: HCPCS | Performed by: NURSE PRACTITIONER

## 2021-03-16 PROCEDURE — G8427 DOCREV CUR MEDS BY ELIG CLIN: HCPCS | Performed by: NURSE PRACTITIONER

## 2021-03-16 PROCEDURE — G8419 CALC BMI OUT NRM PARAM NOF/U: HCPCS | Performed by: NURSE PRACTITIONER

## 2021-03-16 RX ORDER — NAPROXEN 500 MG/1
500 TABLET ORAL 2 TIMES DAILY PRN
Qty: 30 TABLET | Refills: 0 | Status: SHIPPED | OUTPATIENT
Start: 2021-03-16 | End: 2021-04-28

## 2021-03-16 ASSESSMENT — ENCOUNTER SYMPTOMS
SHORTNESS OF BREATH: 0
NAUSEA: 0
CONSTIPATION: 0
WHEEZING: 0
ABDOMINAL PAIN: 0
VOMITING: 0
DIARRHEA: 0
COUGH: 0

## 2021-03-16 NOTE — PATIENT INSTRUCTIONS
Patient Education        naproxen  Pronunciation:  na PROX en  Brand:  Aleve, Anaprox-DS, Midol Extended Relief, Naprelan 500, Naprosyn  What is the most important information I should know about naproxen? Naproxen can increase your risk of fatal heart attack or stroke, even if you don't have any risk factors. Do not use this medicine just before or after heart bypass surgery (coronary artery bypass graft, or CABG). Naproxen may also cause stomach or intestinal bleeding, which can be fatal. These conditions can occur without warning while you are using naproxen, especially in older adults. What is naproxen? Naproxen is a nonsteroidal anti-inflammatory drug (NSAID). Naproxen is used to treat pain or inflammation caused by conditions such as arthritis, ankylosing spondylitis, tendinitis, bursitis, gout, or menstrual cramps. The delayed-release or extended-release tablets are slower-acting forms of naproxen that are used only for treating chronic conditions such as arthritis or ankylosing spondylitis. These forms of naproxen will not work fast enough to treat acute pain. Naproxen may also be used for purposes not listed in this medication guide. What should I discuss with my healthcare provider before taking naproxen? Naproxen can increase your risk of fatal heart attack or stroke, even if you don't have any risk factors. Do not use this medicine just before or after heart bypass surgery (coronary artery bypass graft, or CABG). Naproxen may also cause stomach or intestinal bleeding, which can be fatal. These conditions can occur without warning while you are using naproxen, especially in older adults. You should not use naproxen if you are allergic to it, or if you have ever had an asthma attack or severe allergic reaction after taking aspirin or an NSAID. Ask a doctor before giving naproxen to a child younger than 15years old.   Ask a doctor or pharmacist if this medicine is safe to use if you have:  · medicines for pain, fever, swelling, or cold/flu symptoms. They may contain ingredients similar to naproxen (such as aspirin, ibuprofen, or ketoprofen). Ask your doctor before using an antacid, and use only the type your doctor recommends. Some antacids can make it harder for your body to absorb naproxen. What are the possible side effects of naproxen? Get emergency medical help if you have signs of an allergic reaction (runny or stuffy nose, wheezing or trouble breathing, hives, swelling in your face or throat) or a severe skin reaction (fever, sore throat, burning eyes, skin pain, red or purple skin rash with blistering and peeling). Get emergency medical help if you have signs of a heart attack or stroke: chest pain spreading to your jaw or shoulder, sudden numbness or weakness on one side of the body, slurred speech, leg swelling, feeling short of breath. Stop using naproxen and call your doctor at once if you have:  · shortness of breath (even with mild exertion); · swelling or rapid weight gain;  · the first sign of any skin rash or blister, no matter how mild;  · signs of stomach bleeding --bloody or tarry stools, coughing up blood or vomit that looks like coffee grounds;  · liver problems --nausea, upper stomach pain, loss of appetite, dark urine, aron-colored stools, jaundice (yellowing of the skin or eyes);  · kidney problems --little or no urination, painful urination, swelling in your feet or ankles; or  · low red blood cells (anemia) --pale skin, unusual tiredness, feeling light-headed or short of breath, cold hands and feet. Common side effects may include:  · headache;  · indigestion, heartburn, stomach pain; or  · flu symptoms; This is not a complete list of side effects and others may occur. Call your doctor for medical advice about side effects. You may report side effects to FDA at 6-524-FDA-6096. What other drugs will affect naproxen?   Ask your doctor before using naproxen if you take an antidepressant. Taking certain antidepressants with an NSAID may cause you to bruise or bleed easily. Ask a doctor or pharmacist before using naproxen with any other medications, especially:  · other NSAIDs or salicylates (diflunisal, salsalate);  · antacids and sucralfate;  · cholestyramine;  · cyclosporine;  · digoxin;  · lithium;  · methotrexate;  · pemetrexed;  · probenecid;  · warfarin (Coumadin, Catha Armen) or similar blood thinners;  · a diuretic or \"water pill\"; or  · heart or blood pressure medication. This list is not complete. Other drugs may affect naproxen, including prescription and over-the-counter medicines, vitamins, and herbal products. Not all possible drug interactions are listed here. Where can I get more information? Your pharmacist can provide more information about naproxen. Remember, keep this and all other medicines out of the reach of children, never share your medicines with others, and use this medication only for the indication prescribed. Every effort has been made to ensure that the information provided by [facility] [address] is accurate, up-to-date, and complete, but no guarantee is made to that effect. Drug information contained herein may be time sensitive. [facility] information has been compiled for use by healthcare practitioners and consumers in the [employer] and therefore [facility] does not warrant that uses outside of the [employer] are appropriate, unless specifically indicated otherwise. [facility] drug information does not endorse drugs, diagnose patients or recommend therapy. [facility] drug information is an informational resource designed to assist licensed healthcare practitioners in caring for their patients and/or to serve consumers viewing this service as a supplement to, and not a substitute for, the expertise, skill, knowledge and judgment of healthcare practitioners.  The absence of a warning for a given drug or drug combination in no way should be construed to indicate that the drug or drug combination is safe, effective or appropriate for any given patient. [facility] does not assume any responsibility for any aspect of healthcare administered with the aid of information [facility] provides. The information contained herein is not intended to cover all possible uses, directions, precautions, warnings, drug interactions, allergic reactions, or adverse effects. If you have questions about the drugs you are taking, check with your doctor, nurse or pharmacist.  Copyright 5192-9687 [address]: 17.01. Revision date: 11/3/2020. Care instructions adapted under license by [facility] ([facility]). If you have questions about a medical condition or this instruction, always ask your healthcare professional. [place] [address] any warranty or liability for your use of this information.

## 2021-03-29 ENCOUNTER — TELEPHONE (OUTPATIENT)
Dept: PSYCHOLOGY | Age: 21
End: 2021-03-29

## 2021-03-29 NOTE — TELEPHONE ENCOUNTER
Called mobile number listed, woman answered and stated pt was not home. Sent doxy. me invites to mobile number 2x and email 1x.

## 2021-04-05 ENCOUNTER — OFFICE VISIT (OUTPATIENT)
Dept: ORTHOPEDIC SURGERY | Age: 21
End: 2021-04-05
Payer: COMMERCIAL

## 2021-04-05 VITALS — WEIGHT: 196 LBS | BODY MASS INDEX: 27.44 KG/M2 | HEIGHT: 71 IN

## 2021-04-05 DIAGNOSIS — M51.26 HNP (HERNIATED NUCLEUS PULPOSUS), LUMBAR: ICD-10-CM

## 2021-04-05 DIAGNOSIS — M51.36 DDD (DEGENERATIVE DISC DISEASE), LUMBAR: ICD-10-CM

## 2021-04-05 DIAGNOSIS — M47.26 OTHER SPONDYLOSIS WITH RADICULOPATHY, LUMBAR REGION: ICD-10-CM

## 2021-04-05 DIAGNOSIS — M54.16 LUMBAR RADICULOPATHY: Primary | ICD-10-CM

## 2021-04-05 PROCEDURE — 99213 OFFICE O/P EST LOW 20 MIN: CPT | Performed by: PHYSICIAN ASSISTANT

## 2021-04-05 PROCEDURE — 4004F PT TOBACCO SCREEN RCVD TLK: CPT | Performed by: PHYSICIAN ASSISTANT

## 2021-04-05 PROCEDURE — G8427 DOCREV CUR MEDS BY ELIG CLIN: HCPCS | Performed by: PHYSICIAN ASSISTANT

## 2021-04-05 PROCEDURE — G8419 CALC BMI OUT NRM PARAM NOF/U: HCPCS | Performed by: PHYSICIAN ASSISTANT

## 2021-04-09 ENCOUNTER — TELEPHONE (OUTPATIENT)
Dept: INTERNAL MEDICINE CLINIC | Age: 21
End: 2021-04-09

## 2021-04-09 DIAGNOSIS — R10.9 ABDOMINAL SPASMS: ICD-10-CM

## 2021-04-09 NOTE — TELEPHONE ENCOUNTER
----- Message from Kevin Darrel sent at 4/9/2021  1:33 PM EDT -----  Subject: Refill Request    QUESTIONS  Name of Medication? famotidine (PEPCID) 20 MG tablet  Patient-reported dosage and instructions? 20MG   1 daily   How many days do you have left? 0  [facility]? [id number]  Pharmacy phone number (if available)? [phone]  Additional Information for Provider? Patient say pharmacy is out of   refills. Patient appt is on 6/22  ---------------------------------------------------------------------------  --------------  CALL BACK INFO  What is the best way for the office to contact you? OK to leave message on   voicemail  Preferred Call Back Phone Number?  [phone]

## 2021-04-12 RX ORDER — FAMOTIDINE 20 MG/1
20 TABLET, FILM COATED ORAL
Qty: 90 TABLET | Refills: 0 | Status: SHIPPED | OUTPATIENT
Start: 2021-04-12 | End: 2021-06-22 | Stop reason: SDUPTHER

## 2021-04-13 ENCOUNTER — APPOINTMENT (OUTPATIENT)
Dept: CT IMAGING | Age: 21
End: 2021-04-13
Payer: COMMERCIAL

## 2021-04-13 ENCOUNTER — HOSPITAL ENCOUNTER (EMERGENCY)
Age: 21
Discharge: HOME | End: 2021-04-13
Payer: COMMERCIAL

## 2021-04-13 VITALS
RESPIRATION RATE: 16 BRPM | HEIGHT: 71 IN | SYSTOLIC BLOOD PRESSURE: 128 MMHG | OXYGEN SATURATION: 100 % | DIASTOLIC BLOOD PRESSURE: 76 MMHG | HEART RATE: 68 BPM | BODY MASS INDEX: 28 KG/M2 | WEIGHT: 200 LBS | TEMPERATURE: 97.1 F

## 2021-04-13 DIAGNOSIS — M54.42 LEFT-SIDED LOW BACK PAIN WITH LEFT-SIDED SCIATICA, UNSPECIFIED CHRONICITY: Primary | ICD-10-CM

## 2021-04-13 PROCEDURE — 6360000002 HC RX W HCPCS: Performed by: PHYSICIAN ASSISTANT

## 2021-04-13 PROCEDURE — 72131 CT LUMBAR SPINE W/O DYE: CPT

## 2021-04-13 PROCEDURE — 96372 THER/PROPH/DIAG INJ SC/IM: CPT

## 2021-04-13 PROCEDURE — 99283 EMERGENCY DEPT VISIT LOW MDM: CPT

## 2021-04-13 RX ORDER — CYCLOBENZAPRINE HCL 10 MG
10 TABLET ORAL 3 TIMES DAILY PRN
Qty: 21 TABLET | Refills: 0 | Status: SHIPPED | OUTPATIENT
Start: 2021-04-13 | End: 2021-04-23

## 2021-04-13 RX ORDER — IBUPROFEN 600 MG/1
600 TABLET ORAL EVERY 6 HOURS PRN
Qty: 30 TABLET | Refills: 0 | OUTPATIENT
Start: 2021-04-13 | End: 2021-04-28

## 2021-04-13 RX ORDER — PREDNISONE 10 MG/1
60 TABLET ORAL DAILY
Qty: 30 TABLET | Refills: 0 | Status: SHIPPED | OUTPATIENT
Start: 2021-04-13 | End: 2021-04-18

## 2021-04-13 RX ORDER — ORPHENADRINE CITRATE 30 MG/ML
60 INJECTION INTRAMUSCULAR; INTRAVENOUS ONCE
Status: COMPLETED | OUTPATIENT
Start: 2021-04-13 | End: 2021-04-13

## 2021-04-13 RX ORDER — LIDOCAINE 50 MG/G
1 PATCH TOPICAL DAILY
Qty: 30 PATCH | Refills: 0 | Status: SHIPPED | OUTPATIENT
Start: 2021-04-13 | End: 2021-04-28

## 2021-04-13 RX ORDER — KETOROLAC TROMETHAMINE 30 MG/ML
30 INJECTION, SOLUTION INTRAMUSCULAR; INTRAVENOUS ONCE
Status: COMPLETED | OUTPATIENT
Start: 2021-04-13 | End: 2021-04-13

## 2021-04-13 RX ADMIN — ORPHENADRINE CITRATE 60 MG: 30 INJECTION INTRAMUSCULAR; INTRAVENOUS at 12:07

## 2021-04-13 RX ADMIN — KETOROLAC TROMETHAMINE 30 MG: 30 INJECTION, SOLUTION INTRAMUSCULAR at 12:07

## 2021-04-13 ASSESSMENT — PAIN SCALES - GENERAL: PAINLEVEL_OUTOF10: 0

## 2021-04-13 ASSESSMENT — ENCOUNTER SYMPTOMS
VOMITING: 0
DIARRHEA: 0
BACK PAIN: 1

## 2021-04-13 NOTE — ED PROVIDER NOTES
[address] [facility]        Pt Name: Whit Vaughan  MRN: [id number]  [place] 2000  Date of evaluation: 4/13/2021  Provider: Lovely Chaidez PA-C  PCP: RADHA Todd CNP    MAIKEL. I have evaluated this patient. My supervising physician was available for consultation. CHIEF COMPLAINT       Chief Complaint   Patient presents with    Back Pain     States back pain that radiates down left side started this morning. HISTORY OF PRESENT ILLNESS   (Location, Timing/Onset, Context/Setting, Quality, Duration, Modifying Factors, Severity, Associated Signs and Symptoms)  Note limiting factors. Whit Vaughan is a 23 y.o. male who presents to the emergency department today for evaluation for back pain. The patient states that he does have a bulging disc in his back, and he states that due to this, he states that he has had sciatica. The patient states that he has been seen by orthopedics in the past, and he states that he is actually scheduled to have a injection performed in his back at [facility] next month. The patient states that yesterday he did notice some increasing pain to the left side of his lower back, and he states that he tried to get out of bed this morning, he noticed worsening pain to the left side of his lower back with radiation down his left leg. The patient states that his pain has continued, he states his pain is sharp, constant is rated as a 10/10. His pain is worse with touch and certain movements, and improves with resting. The patient denies any bowel or bladder incontinence or retention. He has no saddle anesthesias. He has no numbness, tingling or weakness. No fever chills. No nausea or vomiting. No recent injections into the back. No history of IV drug use. Patient is unsure if he slept on his back wrong, and this could be causing his pain.   He otherwise denies falling or injuring himself. Nursing Notes were all reviewed and agreed with or any disagreements were addressed in the HPI. REVIEW OF SYSTEMS    (2-9 systems for level 4, 10 or more for level 5)     Review of Systems   Constitutional: Negative for activity change, appetite change and fever. Gastrointestinal: Negative for diarrhea and vomiting. Genitourinary: Negative for difficulty urinating and dysuria. Musculoskeletal: Positive for back pain. Skin: Negative for wound. Neurological: Negative for weakness and numbness. Positives and Pertinent negatives as per HPI. Except as noted above in the ROS, all other systems were reviewed and negative. PAST MEDICAL HISTORY     Past Medical History:   Diagnosis Date    Anxiety     Sciatica, left side     left leg         SURGICAL HISTORY     Past Surgical History:   Procedure Laterality Date    PAIN MANAGEMENT PROCEDURE Left 8/31/2020    LEFT L5 AND S1 TRANSFORAMINAL EPIDURAL STEROID INJECTION performed by Zeb López MD at [address] [place] [address]       Discharge Medication List as of 4/13/2021  1:47 PM      CONTINUE these medications which have NOT CHANGED    Details   famotidine (PEPCID) 20 MG tablet Take 1 tablet by mouth every morning (before breakfast) As needed for GERD, Disp-90 tablet, R-0Normal      naproxen (NAPROSYN) 500 MG tablet Take 1 tablet by mouth 2 times daily as needed for Pain, Disp-30 tablet, R-0Normal      dicyclomine (BENTYL) 20 MG tablet Take 1 tablet by mouth 3 times daily as needed (abdominal spasms), Disp-90 tablet, R-0Normal               ALLERGIES     Patient has no known allergies.     FAMILYHISTORY       Family History   Problem Relation Age of Onset    Depression Mother     Anemia Mother     High Blood Pressure Mother     Alcohol Abuse Mother     Alcohol Abuse Father     Prostate Cancer Neg Hx     Heart Attack Neg Hx     Stroke Neg Hx           SOCIAL HISTORY       Social History     Tobacco Use    Smoking status: Current Every Day Smoker     Packs/day: 0.50     Types: Cigarettes    Smokeless tobacco: Never Used   Substance Use Topics    Alcohol use: Yes     Comment: every three months    Drug use: Not Currently     Comment: pt reports he smokes lavendar. SCREENINGS             PHYSICAL EXAM    (up to 7 for level 4, 8 or more for level 5)     ED Triage Vitals [04/13/21 1145]   BP Temp Temp Source Pulse Resp SpO2 Height Weight   (!) 150/88 97.1 °F (36.2 °C) Oral 89 16 100 % 5' 11\" (1.803 m) 200 lb (90.7 kg)       Physical Exam  Vitals signs and nursing note reviewed. Constitutional:       Appearance: He is well-developed. He is not diaphoretic. HENT:      Head: Normocephalic and atraumatic. Right Ear: External ear normal.      Left Ear: External ear normal.      Nose: Nose normal.   Eyes:      General:         Right eye: No discharge. Left eye: No discharge. Neck:      Musculoskeletal: Normal range of motion and neck supple. Trachea: No tracheal deviation. Cardiovascular:      Pulses: Normal pulses. Pulmonary:      Effort: Pulmonary effort is normal. No respiratory distress. Musculoskeletal: Normal range of motion. Comments: There is tenderness to palpation to the left lower lumbar spine creasing over the SI joint. No midline tenderness. No bony step offs or crepitus. Skin:     General: Skin is warm and dry. Neurological:      Mental Status: He is alert and oriented to person, place, and time. Comments: Motor strength of bilateral lower extremities is 5/5 throughout. Normal sensation light touch. Patellar reflexes and S1 reflexes are 2+ bilaterally. Posterior tibialis pulse and dorsalis pedis pulses are 2+ bilaterally. Negative straight leg raise. Patient is able to ambulate without difficulty. Normal dorsiflexion and plantar flexion. Full range of motion of hip, knee and ankle joints.      Psychiatric:         Behavior: Behavior normal.         DIAGNOSTIC RESULTS   LABS:    Labs Reviewed - No data to display    All other labs were within normal range or not returned as of this dictation. EKG: All EKG's are interpreted by the Emergency Department Physician in the absence of a cardiologist.  Please see their note for interpretation of EKG. RADIOLOGY:   Non-plain film images such as CT, Ultrasound and MRI are read by the radiologist. Plain radiographic images are visualized and preliminarily interpreted by the ED Provider with the below findings:        Interpretation per the Radiologist below, if available at the time of this note:    [place]   Final Result   No evidence of acute process. No fracture. No noncontrast CT evidence   of significant change of the patient's degenerative disease including the   disc osteophyte complex at L5-S1 from the July 2020 MRI. If persistent   unexplained symptomatology consider follow-up MRI. Ct Lumbar Spine Wo Contrast    Result Date: 4/13/2021  EXAMINATION: CT OF THE LUMBAR SPINE WITHOUT CONTRAST  4/13/2021 TECHNIQUE: CT of the lumbar spine was performed without the administration of intravenous contrast. Multiplanar reformatted images are provided for review. Dose modulation, iterative reconstruction, and/or weight based adjustment of the mA/kV was utilized to reduce the radiation dose to as low as reasonably achievable. COMPARISON: Lumbar spine MRI July 14, 2020 HISTORY: ORDERING SYSTEM PROVIDED HISTORY: low back pain TECHNOLOGIST PROVIDED HISTORY: Reason for exam:->low back pain Decision Support Exception->Emergency Medical Condition (MA) Reason for Exam: low back pain Acuity: Unknown Type of Exam: Unknown FINDINGS: BONES/ALIGNMENT: There is normal alignment of the spine. The vertebral body heights are maintained. No osseous destructive lesion is seen.  DEGENERATIVE CHANGES: Grossly no definitive significant change of the degenerative disc disease including disc osteophyte complex at ibuprofen, Flexeril, prednisone for home. Recommended that he follow-up with his primary care physician within 2 to 3 days for reevaluation. He is to return to the ED for any new or worsening symptoms. The patient was understanding agreeable with plan. Stable for discharge. My suspicion is low at this time for occult fracture, dislocation, subluxation, trial occlusion, cauda equina syndrome, epidural abscess, discitis, epidural hematoma or other emergent etiology    FINAL IMPRESSION      1.  Left-sided low back pain with left-sided sciatica, unspecified chronicity          DISPOSITION/PLAN   DISPOSITION Decision To Discharge 04/13/2021 01:40:32 PM      PATIENT REFERREDTO:  RADHA Hollins - CNP  [facility]  [phone]    Schedule an appointment as soon as possible for a visit in 2 days      [facility] Emergency Department  [address] [place] [address]  [phone]    As needed, If symptoms worsen      DISCHARGE MEDICATIONS:  Discharge Medication List as of 4/13/2021  1:47 PM      START taking these medications    Details   ibuprofen (ADVIL;MOTRIN) 600 MG tablet Take 1 tablet by mouth every 6 hours as needed for Pain, Disp-30 tablet, R-0Normal      cyclobenzaprine (FLEXERIL) 10 MG tablet Take 1 tablet by mouth 3 times daily as needed for Muscle spasms, Disp-21 tablet, R-0Normal      lidocaine (LIDODERM) 5 % Place 1 patch onto the skin daily 12 hours on, 12 hours off., Disp-30 patch, R-0Normal      predniSONE (DELTASONE) 10 MG tablet Take 6 tablets by mouth daily for 5 doses, Disp-30 tablet, R-0Normal             DISCONTINUED MEDICATIONS:  Discharge Medication List as of 4/13/2021  1:47 PM                 (Please note that portions of this note were completed with a voice recognition program.  Efforts were made to edit the dictations but occasionally words are mis-transcribed.)    Jennifer Cruz PA-C (electronically signed)            Jennifer Cruz

## 2021-04-19 ENCOUNTER — VIRTUAL VISIT (OUTPATIENT)
Dept: PSYCHOLOGY | Age: 21
End: 2021-04-19
Payer: COMMERCIAL

## 2021-04-19 DIAGNOSIS — F41.0 PANIC ATTACK: Primary | ICD-10-CM

## 2021-04-19 PROCEDURE — 90832 PSYTX W PT 30 MINUTES: CPT | Performed by: PSYCHOLOGIST

## 2021-04-19 NOTE — PROGRESS NOTES
TELEHEALTH VISIT -- Audio/Visual (During KJUOC-14 public health emergency)  }  Pursuant to the emergency declaration under the [address] [place] [address] [facility] authority and the [employer] Act, this Virtual Visit was conducted, with patient's consent, to reduce the patient's risk of exposure to COVID-19 and provide continuity of care for an established patient. Services were provided through a video synchronous discussion virtually to substitute for in-person clinic visit. Pt gave verbal informed consent to participate in telehealth services. Conducted a risk-benefit analysis and determined that the patient's presenting problems are consistent with the use of telepsychology. Determined that the patient has sufficient knowledge and skills in the use of technology enabling them to adequately benefit from telepsychology. It was determined that this patient was able to be properly treated without an in-person session. Patient verified that they were currently located at the [facility] address that was provided during registration or another [facility] address, if noted below.     Verified the following information:  Patient's identification: Yes  Patient location: [address] [place] [address]   Patient's call back number: [phone]   Patient's emergency contact's name and number, as well as permission to contact them if needed: Extended Emergency Contact Information  Primary Emergency Contact: Cele Rodas  Address: [place] [address] Phone: [phone]  Mobile Phone: [phone]  Relation: Parent  Secondary Emergency Contact: David Steen  Address: [address]           Sakina Ortega, [address] [place] [address] Phone: [phone]  Mobile Phone: [phone]  Relation: Brother/Sister     Provider location: [address] Consultation Dustin Chavira, Ph.D.  Psychologist  4/19/2021  8:06 AM      Time spent with Patient: 28 minutes  This is patient's third  [facility] appointment. Reason for Consult:    Chief Complaint   Patient presents with    Stress       Feedback given to PCP. S:  Pt seen for f/u of anxiety. Pt reported unchanged mood and sxs. Would like to be assessed for Autism Spectrum Disorder. Reported sensory overload, difficulty understanding emotions and nonverbal cues. Stated there were other sxs that presented when he researched it, but can't recall. Later noted he identifies w hyperfocusing, has been told he is being an \"asshole\" when he is attempting to be nice, easily absorbs \"random things\" like song lyrics, difficulty w any sensory information that isn't want he wants at the moment (birds chirping when he wants to sleep), struggles w variation from his norm (eats the same foods, genreally the same routine, struggles w unexpected changes). Stated he did not have friends growing up, invited his  to his birthday bc he didn't want to invite peers. Stated he has been doing PMR to help prevent panic attack. Feels \"weak\" at work bc he struggles to perform at times d/t his GI issues. GF is 19, been together a few mos, they work together.      O:  MSE:    Appearance    cooperative  Appetite normal  Sleep disturbance No  Fatigue No  Loss of pleasure No  Impulsive behavior No  Speech    spontaneous, normal rate and normal volume  Mood    Anxious  Affect    normal affect  Thought Content    intact  Thought Process    goal directed and coherent  Associations    logical connections  Insight    Fair  Judgment    Intact  Orientation    oriented to person, place, time, and general circumstances  Memory    recent and remote memory intact  Attention/Concentration    impaired  Morbid ideation No  Suicide Assessment    no suicidal ideation    History:  Social History:   Social History     Socioeconomic History    Marital status: Single Spouse name: Not on file    Number of children: Not on file    Years of education: Not on file    Highest education level: Not on file   Occupational History    Not on file   Social Needs    Financial resource strain: Not on file    Food insecurity     Worry: Not on file     Inability: Not on file    Transportation needs     Medical: Not on file     Non-medical: Not on file   Tobacco Use    Smoking status: Current Every Day Smoker     Packs/day: 0.50     Types: Cigarettes    Smokeless tobacco: Never Used   Substance and Sexual Activity    Alcohol use: Yes     Comment: every three months    Drug use: Not Currently     Comment: pt reports he smokes lavendar.  Sexual activity: Not on file   Lifestyle    Physical activity     Days per week: Not on file     Minutes per session: Not on file    Stress: Not on file   Relationships    Social connections     Talks on phone: Not on file     Gets together: Not on file     Attends Taoist service: Not on file     Active member of club or organization: Not on file     Attends meetings of clubs or organizations: Not on file     Relationship status: Not on file    Intimate partner violence     Fear of current or ex partner: Not on file     Emotionally abused: Not on file     Physically abused: Not on file     Forced sexual activity: Not on file   Other Topics Concern    Not on file   Social History Narrative    Mom lives nearby. 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 Enjoys video games and computers. Reports: \"I am kind of a hermit. \"     TOBACCO:   reports that he has been smoking cigarettes. He has been smoking about 0.50 packs per day. He has never used smokeless tobacco.  ETOH:   reports current alcohol use. Diagnosis:  Panic attack  R/O autism spectrum disorder      Plan:  Pt interventions:  Supportive techniques and Reviewed options for identifying appropriate providers        Documentation was done using voice recognition dragon software.   Every effort was made to ensure accuracy; however, inadvertent, unintentional computerized transcription errors may be present.

## 2021-04-28 ENCOUNTER — HOSPITAL ENCOUNTER (EMERGENCY)
Age: 21
Discharge: HOME | End: 2021-04-28
Payer: COMMERCIAL

## 2021-04-28 ENCOUNTER — APPOINTMENT (OUTPATIENT)
Dept: GENERAL RADIOLOGY | Age: 21
End: 2021-04-28
Payer: COMMERCIAL

## 2021-04-28 VITALS
WEIGHT: 188 LBS | HEIGHT: 71 IN | TEMPERATURE: 98 F | OXYGEN SATURATION: 97 % | BODY MASS INDEX: 26.32 KG/M2 | SYSTOLIC BLOOD PRESSURE: 155 MMHG | RESPIRATION RATE: 16 BRPM | DIASTOLIC BLOOD PRESSURE: 103 MMHG | HEART RATE: 95 BPM

## 2021-04-28 DIAGNOSIS — K59.00 CONSTIPATION, UNSPECIFIED CONSTIPATION TYPE: ICD-10-CM

## 2021-04-28 DIAGNOSIS — M54.42 ACUTE LEFT-SIDED LOW BACK PAIN WITH LEFT-SIDED SCIATICA: Primary | ICD-10-CM

## 2021-04-28 PROCEDURE — 99283 EMERGENCY DEPT VISIT LOW MDM: CPT

## 2021-04-28 PROCEDURE — 6360000002 HC RX W HCPCS: Performed by: PHYSICIAN ASSISTANT

## 2021-04-28 PROCEDURE — 96372 THER/PROPH/DIAG INJ SC/IM: CPT

## 2021-04-28 PROCEDURE — 74018 RADEX ABDOMEN 1 VIEW: CPT

## 2021-04-28 RX ORDER — LIDOCAINE 50 MG/G
1 PATCH TOPICAL DAILY
Qty: 30 PATCH | Refills: 0 | Status: SHIPPED | OUTPATIENT
Start: 2021-04-28 | End: 2022-01-20

## 2021-04-28 RX ORDER — KETOROLAC TROMETHAMINE 30 MG/ML
30 INJECTION, SOLUTION INTRAMUSCULAR; INTRAVENOUS ONCE
Status: COMPLETED | OUTPATIENT
Start: 2021-04-28 | End: 2021-04-28

## 2021-04-28 RX ORDER — NAPROXEN 500 MG/1
500 TABLET ORAL 2 TIMES DAILY WITH MEALS
Qty: 30 TABLET | Refills: 0 | Status: SHIPPED | OUTPATIENT
Start: 2021-04-28 | End: 2021-07-13

## 2021-04-28 RX ORDER — CYCLOBENZAPRINE HCL 10 MG
10 TABLET ORAL 3 TIMES DAILY PRN
Qty: 20 TABLET | Refills: 0 | Status: SHIPPED | OUTPATIENT
Start: 2021-04-28 | End: 2021-05-05

## 2021-04-28 RX ORDER — ORPHENADRINE CITRATE 30 MG/ML
60 INJECTION INTRAMUSCULAR; INTRAVENOUS ONCE
Status: COMPLETED | OUTPATIENT
Start: 2021-04-28 | End: 2021-04-28

## 2021-04-28 RX ADMIN — KETOROLAC TROMETHAMINE 30 MG: 30 INJECTION, SOLUTION INTRAMUSCULAR; INTRAVENOUS at 12:30

## 2021-04-28 RX ADMIN — Medication 240 ML: at 16:46

## 2021-04-28 RX ADMIN — ORPHENADRINE CITRATE 60 MG: 30 INJECTION INTRAMUSCULAR; INTRAVENOUS at 12:29

## 2021-04-28 ASSESSMENT — ENCOUNTER SYMPTOMS
DIARRHEA: 0
ABDOMINAL PAIN: 0
NAUSEA: 0
CHEST TIGHTNESS: 0
VOMITING: 0
COLOR CHANGE: 0
SHORTNESS OF BREATH: 0
RESPIRATORY NEGATIVE: 1
CONSTIPATION: 1
COUGH: 0
BACK PAIN: 1

## 2021-04-28 NOTE — ED PROVIDER NOTES
[facility]        Pt Name: Anitha Shannon  MRN: [id number]  [place] 2000  Date of evaluation: 4/28/2021  Provider: CAPRI Nagy  PCP: RADHA Bah CNP. I have evaluated this patient. My supervising physician was available for consultation. CHIEF COMPLAINT       Chief Complaint   Patient presents with    Constipation     pt states last BM three days ago    Back Pain     pt c/o sciatiac back pain flare up        HISTORY OF PRESENT ILLNESS   (Location, Timing/Onset, Context/Setting, Quality, Duration, Modifying Factors, Severity, Associated Signs and Symptoms)  Note limiting factors. Anitha Shannon is a 19 y.o. male with past medical history of sciatica who presents to the ED with complaint of back pain. Patient states he has pain to his left-sided low back that rates down his left leg. Patient is a longstanding history of back pain and sciatica. States current symptoms feel similar. Denies any falls or injuries. Patient denies history of IV drug abuse. Denies any recent procedure catheterization. Patient denies bowel/bladder incontinence, retention or saddle anesthesia. States pain is described as an aching rated 9/10. States pain worsened with movement and palpation. Patient denies any abdominal pain or distention. States he has had some constipation has not had a bowel movement in 3 days. Patient denies any urinary symptoms, nausea/vomiting or decreased oral intake. Denies any fever chills. Denies any rashes or lesions. Nursing Notes were all reviewed and agreed with or any disagreements were addressed in the HPI. REVIEW OF SYSTEMS    (2-9 systems for level 4, 10 or more for level 5)     Review of Systems   Constitutional: Negative for activity change, appetite change, chills, diaphoresis, fatigue and fever. Respiratory: Negative.   Negative for cough, chest tightness and shortness of breath. Cardiovascular: Negative. Negative for chest pain, palpitations and leg swelling. Gastrointestinal: Positive for constipation. Negative for abdominal pain, diarrhea, nausea and vomiting. Genitourinary: Negative for decreased urine volume, difficulty urinating, dysuria, flank pain, frequency, hematuria and urgency. Musculoskeletal: Positive for arthralgias, back pain and myalgias. Negative for gait problem, joint swelling, neck pain and neck stiffness. Skin: Negative for color change, pallor, rash and wound. Neurological: Negative for dizziness, weakness, light-headedness, numbness and headaches. Positives and Pertinent negatives as per HPI. Except as noted above in the ROS, all other systems were reviewed and negative. PAST MEDICAL HISTORY     Past Medical History:   Diagnosis Date    Anxiety     Sciatica, left side     left leg         SURGICAL HISTORY     Past Surgical History:   Procedure Laterality Date    PAIN MANAGEMENT PROCEDURE Left 8/31/2020    LEFT L5 AND S1 TRANSFORAMINAL EPIDURAL STEROID INJECTION performed by Lenka Vargas MD at [address] [facility]       Previous Medications    DICYCLOMINE (BENTYL) 20 MG TABLET    Take 1 tablet by mouth 3 times daily as needed (abdominal spasms)    FAMOTIDINE (PEPCID) 20 MG TABLET    Take 1 tablet by mouth every morning (before breakfast) As needed for GERD         ALLERGIES     Patient has no known allergies. FAMILYHISTORY       Family History   Problem Relation Age of Onset    Depression Mother     Anemia Mother     High Blood Pressure Mother     Alcohol Abuse Mother     Alcohol Abuse Father     Prostate Cancer Neg Hx     Heart Attack Neg Hx     Stroke Neg Hx           SOCIAL HISTORY       Social History     Tobacco Use    Smoking status: Current Every Day Smoker     Packs/day: 0.50     Types: Cigarettes    Smokeless tobacco: Never Used   Substance Use Topics    Alcohol use:  Yes Comment: every three months    Drug use: Not Currently     Comment: pt reports he smokes lavendar. SCREENINGS             PHYSICAL EXAM    (up to 7 for level 4, 8 or more for level 5)     ED Triage Vitals [04/28/21 1206]   BP Temp Temp Source Pulse Resp SpO2 Height Weight   (!) 155/103 98 °F (36.7 °C) Oral 95 16 97 % 5' 11\" (1.803 m) 188 lb (85.3 kg)       Physical Exam  Constitutional:       General: He is not in acute distress. Appearance: Normal appearance. He is well-developed. He is not ill-appearing, toxic-appearing or diaphoretic. HENT:      Head: Normocephalic and atraumatic. Right Ear: External ear normal.      Left Ear: External ear normal.   Eyes:      General:         Right eye: No discharge. Left eye: No discharge. Neck:      Musculoskeletal: Normal range of motion and neck supple. Cardiovascular:      Rate and Rhythm: Normal rate and regular rhythm. Pulses: Normal pulses. Heart sounds: Normal heart sounds. No murmur. No friction rub. No gallop. Pulmonary:      Effort: Pulmonary effort is normal. No respiratory distress. Breath sounds: Normal breath sounds. No stridor. No wheezing, rhonchi or rales. Chest:      Chest wall: No tenderness. Abdominal:      General: Abdomen is flat. Bowel sounds are normal. There is no distension. Palpations: Abdomen is soft. There is no mass. Tenderness: There is no abdominal tenderness. There is no right CVA tenderness, left CVA tenderness, guarding or rebound. Negative signs include Romero's sign, Rovsing's sign and McBurney's sign. Hernia: No hernia is present. Musculoskeletal: Normal range of motion. Comments: Tenderness to palpation over the left lumbar paraspinal musculature and associated SI joint. No midline tenderness of cervical, thoracic or lumbar spine. No crepitus or step-off. No skin changes. No rashes or lesions.   Patient is full range of motion strength to the bilateral hips, knees and ankles. Full plantar flexion dorsiflexion. Sensation intact light touch. 2+ patellar and Achilles reflexes bilaterally. Gait normal.  Able to stand on tippy toes and heels without difficulty. Skin:     General: Skin is warm and dry. Coloration: Skin is not pale. Findings: No erythema or rash. Neurological:      Mental Status: He is alert and oriented to person, place, and time. Psychiatric:         Behavior: Behavior normal.         DIAGNOSTIC RESULTS   LABS:    Labs Reviewed - No data to display    All other labs were within normal range or not returned as of this dictation. EKG: All EKG's are interpreted by the Emergency Department Physician in the absence of a cardiologist.  Please see their note for interpretation of EKG. RADIOLOGY:   Non-plain film images such as CT, Ultrasound and MRI are read by the radiologist. Plain radiographic images are visualized and preliminarily interpreted by the ED Provider with the below findings:        Interpretation per the Radiologist below, if available at the time of this note:    XR ABDOMEN (KUB) (SINGLE AP VIEW)   Final Result   Large stool load in colon. Bowel gas pattern is nonobstructive           Xr Abdomen (kub) (single Ap View)    Result Date: 4/28/2021  EXAMINATION: ONE SUPINE XRAY VIEW(S) OF THE ABDOMEN 4/28/2021 12:40 pm COMPARISON: None. HISTORY: ORDERING SYSTEM PROVIDED HISTORY: constipation TECHNOLOGIST PROVIDED HISTORY: Reason for exam:->constipation Reason for Exam: Constipation (pt states last BM three days ago) Acuity: Acute Type of Exam: Initial FINDINGS: Lung bases are excluded Large stool load is seen in the colon. No significant small bowel distention noted. No acute bony abnormality     Large stool load in colon.   Bowel gas pattern is nonobstructive           PROCEDURES   Unless otherwise noted below, none     Procedures    CRITICAL CARE TIME   N/A    CONSULTS:  None      EMERGENCY DEPARTMENT COURSE and observation. Anticipate discharge home. We will plan on discharge home with Flexeril, Lidoderm, Naprosyn and mag citrate. FINAL IMPRESSION      1. Acute left-sided low back pain with left-sided sciatica    2. Constipation, unspecified constipation type          DISPOSITION/PLAN   DISPOSITION Discharge - Pending Orders Complete 04/28/2021 05:21:18 PM      PATIENT REFERREDTO:  Jocelyn Zhang APRKARIE - CNP  [address] [facility]  [phone]    Schedule an appointment as soon as possible for a visit   For a Re-check in  3-5    days. [facility] Emergency Department  [address] [facility]  [phone]  Go to   As needed, If symptoms worsen      DISCHARGE MEDICATIONS:  New Prescriptions    CYCLOBENZAPRINE (FLEXERIL) 10 MG TABLET    Take 1 tablet by mouth 3 times daily as needed for Muscle spasms    LIDOCAINE (LIDODERM) 5 %    Place 1 patch onto the skin daily 12 hours on, 12 hours off. MAGNESIUM CITRATE SOLUTION    Take 296 mLs by mouth once for 1 dose    NAPROXEN (NAPROSYN) 500 MG TABLET    Take 1 tablet by mouth 2 times daily (with meals)       DISCONTINUED MEDICATIONS:  Discontinued Medications    IBUPROFEN (ADVIL;MOTRIN) 600 MG TABLET    Take 1 tablet by mouth every 6 hours as needed for Pain    LIDOCAINE (LIDODERM) 5 %    Place 1 patch onto the skin daily 12 hours on, 12 hours off.     NAPROXEN (NAPROSYN) 500 MG TABLET    Take 1 tablet by mouth 2 times daily as needed for Pain              (Please note that portions of this note were completed with a voice recognition program.  Efforts were made to edit the dictations but occasionally words are mis-transcribed.)    CAPRI Dooley (electronically signed)          CAPRI Lopez  04/28/21 3616

## 2021-04-28 NOTE — ED NOTES
Pt had small BM in bedside commode. Small solid pieces with some liquid.      Pilar Bonds, RN  04/28/21 3191

## 2021-05-04 ENCOUNTER — TELEPHONE (OUTPATIENT)
Dept: ORTHOPEDIC SURGERY | Age: 21
End: 2021-05-04

## 2021-05-05 ENCOUNTER — OFFICE VISIT (OUTPATIENT)
Dept: INTERNAL MEDICINE CLINIC | Age: 21
End: 2021-05-05
Payer: COMMERCIAL

## 2021-05-05 VITALS
TEMPERATURE: 98.1 F | OXYGEN SATURATION: 97 % | WEIGHT: 189 LBS | DIASTOLIC BLOOD PRESSURE: 64 MMHG | HEART RATE: 120 BPM | BODY MASS INDEX: 26.36 KG/M2 | SYSTOLIC BLOOD PRESSURE: 122 MMHG

## 2021-05-05 DIAGNOSIS — Z09 HOSPITAL DISCHARGE FOLLOW-UP: Primary | ICD-10-CM

## 2021-05-05 DIAGNOSIS — M54.32 LEFT SIDED SCIATICA: ICD-10-CM

## 2021-05-05 DIAGNOSIS — R00.0 TACHYCARDIA: ICD-10-CM

## 2021-05-05 LAB
BASOPHILS ABSOLUTE: 0 K/UL (ref 0–0.2)
BASOPHILS RELATIVE PERCENT: 0.4 %
EOSINOPHILS ABSOLUTE: 0.1 K/UL (ref 0–0.6)
EOSINOPHILS RELATIVE PERCENT: 1.5 %
HCT VFR BLD CALC: 49 % (ref 40.5–52.5)
HEMOGLOBIN: 17.1 G/DL (ref 13.5–17.5)
LYMPHOCYTES ABSOLUTE: 2 K/UL (ref 1–5.1)
LYMPHOCYTES RELATIVE PERCENT: 27.6 %
MCH RBC QN AUTO: 31.5 PG (ref 26–34)
MCHC RBC AUTO-ENTMCNC: 34.9 G/DL (ref 31–36)
MCV RBC AUTO: 90.2 FL (ref 80–100)
MONOCYTES ABSOLUTE: 0.5 K/UL (ref 0–1.3)
MONOCYTES RELATIVE PERCENT: 6.3 %
NEUTROPHILS ABSOLUTE: 4.6 K/UL (ref 1.7–7.7)
NEUTROPHILS RELATIVE PERCENT: 64.2 %
PDW BLD-RTO: 12.5 % (ref 12.4–15.4)
PLATELET # BLD: 162 K/UL (ref 135–450)
PMV BLD AUTO: 8.6 FL (ref 5–10.5)
RBC # BLD: 5.44 M/UL (ref 4.2–5.9)
TSH REFLEX FT4: 1.23 UIU/ML (ref 0.27–4.2)
WBC # BLD: 7.1 K/UL (ref 4–11)

## 2021-05-05 PROCEDURE — 93000 ELECTROCARDIOGRAM COMPLETE: CPT | Performed by: NURSE PRACTITIONER

## 2021-05-05 PROCEDURE — G8419 CALC BMI OUT NRM PARAM NOF/U: HCPCS | Performed by: NURSE PRACTITIONER

## 2021-05-05 PROCEDURE — 4004F PT TOBACCO SCREEN RCVD TLK: CPT | Performed by: NURSE PRACTITIONER

## 2021-05-05 PROCEDURE — 99214 OFFICE O/P EST MOD 30 MIN: CPT | Performed by: NURSE PRACTITIONER

## 2021-05-05 PROCEDURE — G8427 DOCREV CUR MEDS BY ELIG CLIN: HCPCS | Performed by: NURSE PRACTITIONER

## 2021-05-05 ASSESSMENT — ENCOUNTER SYMPTOMS
SHORTNESS OF BREATH: 0
COUGH: 0
NAUSEA: 0
VOMITING: 0
DIARRHEA: 0
ABDOMINAL PAIN: 0
WHEEZING: 0
CONSTIPATION: 0
BACK PAIN: 1

## 2021-05-05 NOTE — PROGRESS NOTES
Office Visit   5/5/2021    Subjective:  Chief Complaint   Patient presents with    ED Follow-up     [facility], 4/28, left sided sciatica pain, pt states the pain is still the same     HPI:   Page Carranza is a 19 y.o. male who presents to the clinic today for follow up.     Low back/left leg painfollowing with Dr. Maria Elena Oneill, pain management and ortho. Performing physical therapy exercises. EMG stable. Referred to a spine surgeon. Has not had his second epidural injection- but plans to get one. Since, has been to the ER twice - 4/13/2021 and 4/28/2021. Pt was given ibuprofen, Flexeril and prednisone. Had constipation in ER as well. Opioids were avoided d/t this concern. D/c'd home with Flexeril, Lidoderm, Naprosyn and mag citrate. Appears pt was scheduled with spine surgeon. However, this was rescheduled/cancelled multiple times. He has not rescheduled. He is going to have epidural, but states that he has not scheduled- Confusion regarding appt times per pt. States the Lidoderm helped. States he took mag citrate once and now having regular BMs. Pain still present. Would like an epidural.     Review of Systems   Constitutional: Negative for chills, fatigue, fever and unexpected weight change. Eyes: Negative for visual disturbance. Respiratory: Negative for cough, shortness of breath and wheezing. Cardiovascular: Negative for chest pain, palpitations and leg swelling. Gastrointestinal: Negative for abdominal pain, constipation, diarrhea, nausea and vomiting. Musculoskeletal: Positive for back pain. Skin: Negative for pallor and rash.      No Known Allergies    Current Outpatient Rx   Medication Sig Dispense Refill    naproxen (NAPROSYN) 500 MG tablet Take 1 tablet by mouth 2 times daily (with meals) 30 tablet 0    cyclobenzaprine (FLEXERIL) 10 MG tablet Take 1 tablet by mouth 3 times daily as needed for Muscle spasms 20 tablet 0    lidocaine (LIDODERM) 5 % Place 1 patch onto the skin daily 12 hours on, 12 hours off. 30 patch 0    famotidine (PEPCID) 20 MG tablet Take 1 tablet by mouth every morning (before breakfast) As needed for GERD 90 tablet 0    dicyclomine (BENTYL) 20 MG tablet Take 1 tablet by mouth 3 times daily as needed (abdominal spasms) 90 tablet 0     Patient Active Problem List   Diagnosis    Left sided sciatica    Anxiety    Left leg numbness     Wt Readings from Last 3 Encounters:   05/05/21 189 lb (85.7 kg)   04/28/21 188 lb (85.3 kg)   04/13/21 200 lb (90.7 kg)     BP Readings from Last 3 Encounters:   05/05/21 122/64   04/28/21 (!) 155/103   04/13/21 128/76     Objective/Physical Exam:  /64   Pulse 120   Temp 98.1 °F (36.7 °C) (Oral)   Wt 189 lb (85.7 kg)   SpO2 97%   BMI 26.36 kg/m²   Body mass index is 26.36 kg/m². Physical Exam  Vitals signs reviewed. Constitutional:       General: He is not in acute distress. Appearance: He is well-developed. He is diaphoretic. HENT:      Head: Normocephalic and atraumatic. Eyes:      Pupils: Pupils are equal, round, and reactive to light. Cardiovascular:      Rate and Rhythm: Regular rhythm. Tachycardia present. Pulmonary:      Effort: Pulmonary effort is normal. No respiratory distress. Breath sounds: Normal breath sounds. No wheezing or rales. Chest:      Chest wall: No tenderness. Abdominal:      General: Bowel sounds are normal.      Palpations: Abdomen is soft. Skin:     General: Skin is warm. Coloration: Skin is not pale. Findings: No erythema or rash. Comments: Standing throughout the visit   Neurological:      General: No focal deficit present. Mental Status: He is alert and oriented to person, place, and time. Cranial Nerves: No cranial nerve deficit. Sensory: No sensory deficit. Motor: No weakness.       Coordination: Coordination normal.      Gait: Gait normal.   Psychiatric:         Mood and Affect: Mood normal.       Assessment and Plan:  Beata Giordano was seen today for ed follow-up. Diagnoses and all orders for this visit:    Tachycardia  -    Diaphoretic in appearance and to the touch. - When auscultating the patient's heart, the patient's heart rate was in the 90s and within a few seconds would be 120s to 130. Heart rate then would come down to the 90s again. Auscultated for a few minutes and this continued intermittently. Patient was asymptomatic throughout- his only complaint was back pains.  - Denies chest pain, palpitations, shortness of breath, trouble breathing, lightheadedness, dizziness or blurred vision.  -     EKG 12 lead- reviewed EKG with Dr. Ronnie Prado, internal medicine. Dr. Ronnie Prado recommended CBC, CMP and TSH as well as a drug screen and Holter monitor  -     COMPREHENSIVE METABOLIC PANEL; Future  -     CBC Auto Differential; Future  -     TSH WITH REFLEX TO FT4; Future  -     Drug Panel-PM-HI Res-UR Interp-A; Future- pt was in the office for >2 hours drinking water in order to provide urine sample  -     Holter Monitor 24 Hour; Future  - Patient denies all illegal drug use. - States he stopped smoking marijuana 3 years. Was on probation for 2 years and never restarted. Hospital discharge follow-up/Left sided sciatica   - Per ortho note 4/5/2021: \"Plan: We discussed treatment options including observation, additional oral steroids, physical therapy, additional epidural injections and microlumbar discectomy. He wishes to try a 2nd epidural injection. He will call for a new lumbar MRI if his symptoms persist after that. \"   - Strongly recommended pt f/u with ortho as recommended. - Messaged ortho regarding plan of care. - Red flag warning signs reviewed with the patient and he will go to the ER if these occur    Return for as previously scheduled or sooner if needed. Pt will call if symptoms worsen or fail to improve. All questions answered. Pt states no further questions or concerns at this time.    Electronically signed by: Lisa Patterson 05/05/21 Hao

## 2021-05-06 LAB
A/G RATIO: 1.7 (ref 1.1–2.2)
ALBUMIN SERPL-MCNC: 4.8 G/DL (ref 3.4–5)
ALP BLD-CCNC: 76 U/L (ref 40–129)
ALT SERPL-CCNC: <5 U/L (ref 10–40)
ANION GAP SERPL CALCULATED.3IONS-SCNC: 12 MMOL/L (ref 3–16)
AST SERPL-CCNC: <5 U/L (ref 15–37)
BILIRUB SERPL-MCNC: 0.6 MG/DL (ref 0–1)
BUN BLDV-MCNC: 15 MG/DL (ref 7–20)
CALCIUM SERPL-MCNC: 9.3 MG/DL (ref 8.3–10.6)
CHLORIDE BLD-SCNC: 100 MMOL/L (ref 99–110)
CO2: 26 MMOL/L (ref 21–32)
CREAT SERPL-MCNC: 0.7 MG/DL (ref 0.9–1.3)
GFR AFRICAN AMERICAN: >60
GFR NON-AFRICAN AMERICAN: >60
GLOBULIN: 2.8 G/DL
GLUCOSE BLD-MCNC: 93 MG/DL (ref 70–99)
POTASSIUM SERPL-SCNC: 4.7 MMOL/L (ref 3.5–5.1)
SODIUM BLD-SCNC: 138 MMOL/L (ref 136–145)
TOTAL PROTEIN: 7.6 G/DL (ref 6.4–8.2)

## 2021-05-09 LAB
6-ACETYLMORPHINE: NOT DETECTED
7-AMINOCLONAZEPAM: NOT DETECTED
ALPHA-OH-ALPRAZOLAM: NOT DETECTED
ALPHA-OH-MIDAZOLAM, URINE: NOT DETECTED
ALPRAZOLAM: NOT DETECTED
AMPHETAMINE: NOT DETECTED
BARBITURATES: NOT DETECTED
BENZOYLECGONINE: NOT DETECTED
BUPRENORPHINE: NOT DETECTED
CARISOPRODOL: NOT DETECTED
CLONAZEPAM: NOT DETECTED
CODEINE: NOT DETECTED
CREATININE URINE: 86.8 MG/DL (ref 20–400)
DIAZEPAM: NOT DETECTED
DRUGS EXPECTED: NORMAL
EER PAIN MGT DRUG PANEL, HIGH RES/EMIT U: NORMAL
ETHYL GLUCURONIDE: NOT DETECTED
FENTANYL: NOT DETECTED
GABAPENTIN: NOT DETECTED
HYDROCODONE: NOT DETECTED
HYDROMORPHONE: NOT DETECTED
LORAZEPAM: NOT DETECTED
MARIJUANA METABOLITE: NOT DETECTED
MDA: NOT DETECTED
MDEA: NOT DETECTED
MDMA URINE: NOT DETECTED
MEPERIDINE: NOT DETECTED
METHADONE: NOT DETECTED
METHAMPHETAMINE: NOT DETECTED
METHYLPHENIDATE: NOT DETECTED
MIDAZOLAM: NOT DETECTED
MORPHINE: NOT DETECTED
NALOXONE: NOT DETECTED
NORBUPRENORPHINE, FREE: NOT DETECTED
NORDIAZEPAM: NOT DETECTED
NORFENTANYL: NOT DETECTED
NORHYDROCODONE, URINE: NOT DETECTED
NOROXYCODONE: NOT DETECTED
NOROXYMORPHONE, URINE: NOT DETECTED
OXAZEPAM: NOT DETECTED
OXYCODONE: NOT DETECTED
OXYMORPHONE: NOT DETECTED
PAIN MANAGEMENT DRUG PANEL: NORMAL
PAIN MANAGEMENT DRUG PANEL: NORMAL
PCP: NOT DETECTED
PHENTERMINE: NOT DETECTED
PREGABALIN: NOT DETECTED
TAPENTADOL, URINE: NOT DETECTED
TAPENTADOL-O-SULFATE, URINE: NOT DETECTED
TEMAZEPAM: NOT DETECTED
TRAMADOL: NOT DETECTED
ZOLPIDEM: NOT DETECTED

## 2021-05-10 ENCOUNTER — HOSPITAL ENCOUNTER (OUTPATIENT)
Dept: NEUROLOGY | Age: 21
Discharge: HOME | End: 2021-05-10
Payer: COMMERCIAL

## 2021-05-10 DIAGNOSIS — R00.0 TACHYCARDIA: ICD-10-CM

## 2021-05-10 PROCEDURE — 93225 XTRNL ECG REC<48 HRS REC: CPT

## 2021-05-10 PROCEDURE — 93226 XTRNL ECG REC<48 HR SCAN A/R: CPT

## 2021-05-13 LAB
ACQUISITION DURATION: NORMAL S
AVERAGE HEART RATE: 98 BPM
EKG DIAGNOSIS: NORMAL
HOLTER MAX HEART RATE: 151 BPM
HOOKUP DATE: NORMAL
HOOKUP TIME: NORMAL
MAX HEART RATE TIME/DATE: NORMAL
MIN HEART RATE TIME/DATE: NORMAL
MIN HEART RATE: 65 BPM
NUMBER OF QRS COMPLEXES: NORMAL
NUMBER OF SUPRAVENTRICULAR COUPLETS: 0
NUMBER OF SUPRAVENTRICULAR ECTOPICS: 8
NUMBER OF SUPRAVENTRICULAR ISOLATED BEATS: 8
NUMBER OF VENTRICULAR BIGEMINAL CYCLES: 0
NUMBER OF VENTRICULAR COUPLETS: 0
NUMBER OF VENTRICULAR ECTOPICS: 0

## 2021-05-17 ENCOUNTER — VIRTUAL VISIT (OUTPATIENT)
Dept: PSYCHOLOGY | Age: 21
End: 2021-05-17
Payer: COMMERCIAL

## 2021-05-17 DIAGNOSIS — F17.210 CIGARETTE NICOTINE DEPENDENCE WITHOUT COMPLICATION: ICD-10-CM

## 2021-05-17 DIAGNOSIS — F41.0 PANIC ATTACK: Primary | ICD-10-CM

## 2021-05-17 PROCEDURE — 90832 PSYTX W PT 30 MINUTES: CPT | Performed by: PSYCHOLOGIST

## 2021-05-17 NOTE — PROGRESS NOTES
TELEHEALTH VISIT -- Audio Only (During XDKUO-11 public health emergency)  }  Pursuant to the emergency declaration under the [address] [place] [address], [facility] authority and the [employer] Act, this phone call was conducted, with patient's consent, to reduce the patient's risk of exposure to COVID-19 and provide continuity of care for an established patient. Services were provided through a phone call discussion to substitute for in-person clinic visit. Pt gave verbal informed consent to participate in telehealth services. Consent:  He and/or health care decision maker is aware that that he may receive a bill for this telephone service, depending on his insurance coverage, and has provided verbal consent to proceed: Yes    Conducted a risk-benefit analysis and determined that the patient's presenting problems are consistent with the use of telepsychology. Determined that the patient has sufficient knowledge and skills in the use of technology enabling them to adequately benefit from telepsychology. It was determined that this patient was able to be properly treated without an in-person session. Patient verified that they were currently located at the [facility] address that was provided during registration.     Verified the following information:  Patient's identification: Yes  Patient location: [address] [place] [address]  Patient's call back number: [phone]   Patient's emergency contact's name and number, as well as permission to contact them if needed: Extended Emergency Contact Information  Primary Emergency Contact: Cele Rodas  Address: [facility], [address] [place] [address] Phone: [phone]  Mobile Phone: [phone]  Relation: Parent  Secondary Emergency Contact: Ana Paula Cho  Address: [address]           [facility], [address] [place] [address]  Home Phone: Alfredo Massing to person, place, time, and general circumstances  Memory    recent and remote memory intact  Attention/Concentration    impaired  Morbid ideation No  Suicide Assessment    no suicidal ideation    History:  Social History:   Social History     Socioeconomic History    Marital status: Single     Spouse name: Not on file    Number of children: Not on file    Years of education: Not on file    Highest education level: Not on file   Occupational History    Not on file   Tobacco Use    Smoking status: Current Every Day Smoker     Packs/day: 0.50     Types: Cigarettes    Smokeless tobacco: Never Used   Vaping Use    Vaping Use: Every day    Substances: Always   Substance and Sexual Activity    Alcohol use: Yes     Comment: every three months    Drug use: Not Currently     Comment: pt reports he smokes lavendar.  Sexual activity: Not on file   Other Topics Concern    Not on file   Social History Narrative    Mom lives nearby. 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 Enjoys video games and computers. Reports: \"I am kind of a hermit. \"     Social Determinants of Health     Financial Resource Strain:     Difficulty of Paying Living Expenses:    Food Insecurity:     Worried About Running Out of Food in the Last Year:     [address] in the Last Year:    Transportation Needs:     Lack of Transportation (Medical):      Lack of Transportation (Non-Medical):    Physical Activity:     Days of Exercise per Week:     Minutes of Exercise per Session:    Stress:     Feeling of Stress :    Social Connections:     Frequency of Communication with Friends and Family:     Frequency of Social Gatherings with Friends and Family:     Attends Buddhist Services:     Active Member of Clubs or Organizations:     Attends Club or Organization Meetings:     Marital Status:    Intimate Partner Violence:     Fear of Current or Ex-Partner:     Emotionally Abused:     Physically Abused:     Sexually Abused:      TOBACCO:   reports that he has been smoking cigarettes. He has been smoking about 0.50 packs per day. He has never used smokeless tobacco.  ETOH:   reports current alcohol use. Diagnosis:  1. Panic attack    2. Nicotine dependence      Plan:  Pt interventions:  Discussed and problem-solved barriers in adhering to behavioral change plan, Motivational Interviewing to increase patient confidence and compliance with adhering to behavioral change plan and Motivational Interviewing to determine importance and readiness for change        Documentation was done using voice recognition dragon software. Every effort was made to ensure accuracy; however, inadvertent, unintentional computerized transcription errors may be present.

## 2021-05-17 NOTE — Clinical Note
He wants to quit nicotine and is interest in NRT, specifically the gum. Do you feel comfortable rx'ing this?

## 2021-05-20 ENCOUNTER — TELEPHONE (OUTPATIENT)
Dept: INTERNAL MEDICINE CLINIC | Age: 21
End: 2021-05-20

## 2021-05-20 NOTE — TELEPHONE ENCOUNTER
----- Message from RADHA Spencer CNP sent at 5/20/2021  4:53 PM EDT -----      ----- Message -----  From: Jessica Ortiz, PhD  Sent: 5/20/2021   1:11 PM EDT  To: RADHA Spencer CNP    He wants to quit nicotine and is interest in NRT, specifically the gum. Do you feel comfortable rx'ing this?

## 2021-06-07 NOTE — PROGRESS NOTES
PATIENT REACHED   YES__X__NO____    PREOP INSTRUCTIONS LEFT ON VM NUMBER_______________      DATE__6/10/21_______ TIME_1345________ARRIVAL__1245______PLACE__masc__________  NOTHING TO EAT OR DRINK  AFTER MIDNIGHT THE EVENING PRIOR OR AS INSTRUCTED BY YOUR DR.  Alix Zuñiga NEED A RESPONSIBLE ADULT AGE 18 OR OLDER TO DRIVE YOU HOME  PLEASE BRING INSURANCE CARD. PICTURE ID AND COMPLETE LIST OF MEDS  WEAR LOOSE COMFORTABLE CLOTHING  FOLLOW ANY INSTRUCTIONS YOUR DRS OFFICE HAS GIVEN YOU,INCLUDING WHAT MEDICATIONS TO TAKE THE AM OF PROCEDURE AND WHEN AND IF YOU NEED TO STOP ANY BLOOD THINNERS. IF YOU HAVE QUESTIONS REGARDING THIS CALL THE OFFICE  THE GOAL BLOOD SUGAR THE AM OF PROCEDURE  OR LESS ABOVE THAT THE PROCEDURE MAY BE CANCELLED  ANY QUESTIONS CALL YOUR DOCTOR. ALSO,PLEASE READ THE INSTRUCTION PACKET FROM YOUR DR IF YOU RECEIVED ONE. SPINE INTERVENTION NUMBER [phone]      OTHER___________________________________      VISITOR POLICY(subject to change)      There is a one visitor policy at [facility] for all surgeries and endoscopies. Whether the visitor can stay or will be asked to wait in the car will depend on the current policy and if social distancing can be maintained. The policy is subject to change at any time. Please make sure the visitor has a cell phone that is on,charged and able to accept calls, as this may be the way that the staff communicates with them. Pain management is NO VISITOR policyThe patients ride is expected to remain in the car with a cell phone for communication. If the ride is leaving the hospital grounds please make sure they are back in time for pickup. Have the patient inform the staff on arrival what their rides plans are while the patient is in the facility. At the MAIN there is one visitor allowed. Please note that the visitor policy is subject to change.

## 2021-06-10 ENCOUNTER — HOSPITAL ENCOUNTER (OUTPATIENT)
Age: 21
Setting detail: OUTPATIENT SURGERY
Discharge: HOME | End: 2021-06-10
Attending: PHYSICAL MEDICINE & REHABILITATION | Admitting: PHYSICAL MEDICINE & REHABILITATION
Payer: COMMERCIAL

## 2021-06-10 ENCOUNTER — APPOINTMENT (OUTPATIENT)
Dept: GENERAL RADIOLOGY | Age: 21
End: 2021-06-10
Attending: PHYSICAL MEDICINE & REHABILITATION
Payer: COMMERCIAL

## 2021-06-10 VITALS
OXYGEN SATURATION: 99 % | WEIGHT: 200 LBS | RESPIRATION RATE: 16 BRPM | SYSTOLIC BLOOD PRESSURE: 116 MMHG | DIASTOLIC BLOOD PRESSURE: 84 MMHG | TEMPERATURE: 98.4 F | BODY MASS INDEX: 28 KG/M2 | HEIGHT: 71 IN | HEART RATE: 99 BPM

## 2021-06-10 PROCEDURE — 3610000054 HC PAIN LEVEL 3 BASE (NON-OR): Performed by: PHYSICAL MEDICINE & REHABILITATION

## 2021-06-10 PROCEDURE — 2709999900 HC NON-CHARGEABLE SUPPLY: Performed by: PHYSICAL MEDICINE & REHABILITATION

## 2021-06-10 PROCEDURE — 99152 MOD SED SAME PHYS/QHP 5/>YRS: CPT | Performed by: PHYSICAL MEDICINE & REHABILITATION

## 2021-06-10 PROCEDURE — 3209999900 FLUORO FOR SURGICAL PROCEDURES

## 2021-06-10 PROCEDURE — 2500000003 HC RX 250 WO HCPCS: Performed by: PHYSICAL MEDICINE & REHABILITATION

## 2021-06-10 PROCEDURE — 6360000002 HC RX W HCPCS: Performed by: PHYSICAL MEDICINE & REHABILITATION

## 2021-06-10 RX ORDER — DEXAMETHASONE SODIUM PHOSPHATE 10 MG/ML
INJECTION, SOLUTION INTRAMUSCULAR; INTRAVENOUS
Status: COMPLETED | OUTPATIENT
Start: 2021-06-10 | End: 2021-06-10

## 2021-06-10 RX ORDER — LIDOCAINE HYDROCHLORIDE 10 MG/ML
INJECTION, SOLUTION EPIDURAL; INFILTRATION; INTRACAUDAL; PERINEURAL
Status: COMPLETED | OUTPATIENT
Start: 2021-06-10 | End: 2021-06-10

## 2021-06-10 ASSESSMENT — PAIN SCALES - GENERAL
PAINLEVEL_OUTOF10: 0
PAINLEVEL_OUTOF10: 0

## 2021-06-10 ASSESSMENT — PAIN - FUNCTIONAL ASSESSMENT: PAIN_FUNCTIONAL_ASSESSMENT: 0-10

## 2021-06-10 ASSESSMENT — PAIN DESCRIPTION - DESCRIPTORS: DESCRIPTORS: NUMBNESS

## 2021-06-10 NOTE — PROGRESS NOTES
IV discontinued, catheter intact, and dressing applied. Procedural dressing dry and intact. Bilateral lower extremities equal in strength. Discharge instructions reviewed with patient or responsible adult, signed and copy given. All home medications have been reviewed. All questions answered and patient or responsible adult verbalized understanding.   PAIN LEVEL AT DISCHARGE ____0_

## 2021-06-10 NOTE — H&P
HISTORY AND PHYSICAL/PRE-SEDATION ASSESSMENT    Patient:  Sabas Solis   :  2000  Medical Record No.:  [id number]   Date:  6/10/2021  Physician:  Verlinda Aschoff, MD  Facility: [address] [place] [address] Drive:                 The patient is a 19 y.o. male whom presents with leg pain. Review of the imaging and physical exam of the patient confirmed the pre-procedure diagnosis. After a thorough discussion of risks, benefits and alternatives informed consent was obtained. Diagnosis:  M54.16  LUMBAR RADICULOPATHY    Past Medical History:   Past Medical History:   Diagnosis Date    Anxiety     Sciatica, left side     left leg        Past Surgical History:     Past Surgical History:   Procedure Laterality Date    PAIN MANAGEMENT PROCEDURE Left 2020    LEFT L5 AND S1 TRANSFORAMINAL EPIDURAL STEROID INJECTION performed by Marybeth Bourgeois MD at [facility]       Current Medications:   Prior to Admission medications    Medication Sig Start Date End Date Taking? Authorizing Provider   naproxen (NAPROSYN) 500 MG tablet Take 1 tablet by mouth 2 times daily (with meals) 21  Yes CAPRI Hanna   famotidine (PEPCID) 20 MG tablet Take 1 tablet by mouth every morning (before breakfast) As needed for GERD 21  Yes RADHA Snell CNP   dicyclomine (BENTYL) 20 MG tablet Take 1 tablet by mouth 3 times daily as needed (abdominal spasms) 12/15/20  Yes RADHA Snell CNP   lidocaine (LIDODERM) 5 % Place 1 patch onto the skin daily 12 hours on, 12 hours off. 21   CAPRI Hanna   ibuprofen (ADVIL;MOTRIN) 600 MG tablet Take 1 tablet by mouth every 6 hours as needed for Pain 21  Jn Hardy PA-C       Allergies:  Patient has no known allergies. Social History:    reports that he has been smoking cigarettes. He has been smoking about 0.50 packs per day.  He has never used smokeless tobacco. He reports current alcohol use. He reports previous drug use. Family History:   Family History   Problem Relation Age of Onset    Depression Mother     Anemia Mother     High Blood Pressure Mother     Alcohol Abuse Mother     Alcohol Abuse Father     Prostate Cancer Neg Hx     Heart Attack Neg Hx     Stroke Neg Hx         Vitals: Blood pressure 124/83, pulse 117, temperature 98.4 °F (36.9 °C), temperature source Temporal, resp. rate 16, height 5' 11\" (1.803 m), weight 200 lb (90.7 kg), SpO2 97 %. PHYSICAL EXAM:  HENT: Airway patent and reviewed  Cardiovascular: Normal rate, regular rhythm, normal heart sounds. Pulmonary/Chest: No wheezes. No rhonchi. No rales. Abdominal: Soft. Bowel sounds are normal. No distension. Extremities: Moves all extremities equally  Lumbar Spine: Painful range of motion, no midline tenderness     ASA CLASS:         []   I. Normal, healthy adult           [x]   II.  Mild systemic disease            []   III. Severe systemic disease    Mallampati: Mallampati Class II - (soft palate, fauces & uvula are visible)      Sedation plan:   []  Local              [x]  Minimal                  []  General anesthesia    Treatment plan:  Patient's condition acceptable for planned procedure/sedation. Proceed with planned procedure   Post Procedure Plan   Return to same level of care   ______________________     The risks and benefits as well as alternatives to the procedure have been discussed with the patient and or family. The patient and/or next of kin understands and agrees to proceed.     Juan Paula MD

## 2021-06-14 ENCOUNTER — VIRTUAL VISIT (OUTPATIENT)
Dept: PSYCHOLOGY | Age: 21
End: 2021-06-14
Payer: COMMERCIAL

## 2021-06-14 DIAGNOSIS — F41.0 PANIC ATTACK: Primary | ICD-10-CM

## 2021-06-14 PROCEDURE — 90832 PSYTX W PT 30 MINUTES: CPT | Performed by: PSYCHOLOGIST

## 2021-06-14 NOTE — PROGRESS NOTES
TELEHEALTH VISIT -- Audio/Visual (During SMLXQ-69 public health emergency)  }  Pursuant to the emergency declaration under the [address] [place] [address] [facility] authority and the [employer] Act, this Virtual Visit was conducted, with patient's consent, to reduce the patient's risk of exposure to COVID-19 and provide continuity of care for an established patient. Services were provided through a video synchronous discussion virtually to substitute for in-person clinic visit. Pt gave verbal informed consent to participate in telehealth services. Conducted a risk-benefit analysis and determined that the patient's presenting problems are consistent with the use of telepsychology. Determined that the patient has sufficient knowledge and skills in the use of technology enabling them to adequately benefit from telepsychology. It was determined that this patient was able to be properly treated without an in-person session. Patient verified that they were currently located at the [facility] address that was provided during registration or another [facility] address, if noted below.     Verified the following information:  Patient's identification: Yes  Patient location: [address] [place] [address]   Patient's call back number: [phone]   Patient's emergency contact's name and number, as well as permission to contact them if needed: Extended Emergency Contact Information  Primary Emergency Contact: Cele Rodas  Address: [address] Phone: [phone]  Mobile Phone: [phone]  Relation: Parent  Secondary Emergency Contact: Donovan Perez  Address: [address]           [address] Phone: [phone]  Mobile Phone: [phone]  Relation: Brother/Sister     Provider location: [place], [address] Consultation  Milena Burns, Ph.D.  Psychologist  6/14/2021  11:12 AM    Time spent with Patient: 20 minutes  This is patient's fifth  [facility] appointment. Reason for Consult:    Chief Complaint   Patient presents with    Stress       Feedback given to PCP. S:  Pt seen for f/u of panic attack. Pt reported \"great\" mood and sxs. Stated he's been getting irritated more than he thinks he should and doesn't know what do when he's angry. Pt struggle to articulate what anger feels like or what he gets angry about except one friend who annoys him. Later noted tightness \"everywhere,\" poor focus, warm. Occurs a few x/wk, especially when someone isn't grasping a concept. Discussed de-esclation skills and repair attempts.       O:  MSE:    Appearance    alert, cooperative  Appetite normal  Sleep disturbance Yes  Fatigue Yes  Loss of pleasure No  Impulsive behavior No  Speech    spontaneous, normal rate, normal volume and well articulated  Mood    euthymic  Affect    normal affect  Thought Content    intact  Thought Process    linear, goal directed and coherent  Associations    logical connections  Insight    Fair  Judgment    far  Orientation    oriented to person, place, time, and general circumstances  Memory    recent and remote memory intact  Attention/Concentration    impaired  Morbid ideation No  Suicide Assessment    no suicidal ideation    History:  Social History:   Social History     Socioeconomic History    Marital status: Single     Spouse name: Not on file    Number of children: Not on file    Years of education: Not on file    Highest education level: Not on file   Occupational History    Not on file   Tobacco Use    Smoking status: Current Every Day Smoker     Packs/day: 0.50     Types: Cigarettes    Smokeless tobacco: Never Used   Vaping Use    Vaping Use: Every day    Substances: Always   Substance and Sexual Activity    Alcohol use: Yes     Comment: every three months    Drug use: Not Currently Comment: pt reports he smokes lavendar.  Sexual activity: Not on file   Other Topics Concern    Not on file   Social History Narrative    Mom lives nearby. 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 Enjoys video games and computers. Reports: \"I am kind of a hermit. \"     Social Determinants of Health     Financial Resource Strain:     Difficulty of Paying Living Expenses:    Food Insecurity:     Worried About Running Out of Food in the Last Year:     [address] in the Last Year:    Transportation Needs:     Lack of Transportation (Medical):  Lack of Transportation (Non-Medical):    Physical Activity:     Days of Exercise per Week:     Minutes of Exercise per Session:    Stress:     Feeling of Stress :    Social Connections:     Frequency of Communication with Friends and Family:     Frequency of Social Gatherings with Friends and Family:     Attends [employer] Services:     Active Member of Clubs or Organizations:     Attends Club or Organization Meetings:     Marital Status:    Intimate Partner Violence:     Fear of Current or Ex-Partner:     Emotionally Abused:     Physically Abused:     Sexually Abused:      TOBACCO:   reports that he has been smoking cigarettes. He has been smoking about 0.50 packs per day. He has never used smokeless tobacco.  ETOH:   reports current alcohol use. Diagnosis:  1. Panic attack          Plan:  Pt interventions:  Provided Psychoeducation re: de-escalation and attention to physical sensations        Documentation was done using voice recognition dragon software. Every effort was made to ensure accuracy; however, inadvertent, unintentional computerized transcription errors may be present.

## 2021-06-16 NOTE — PROGRESS NOTES
Post Procedure Call    Attempted to reached patient by phone at 11:31 AM on 6/16/2021. Unable to leave message.

## 2021-06-18 NOTE — PROGRESS NOTES
Office Visit   6/22/2021    Subjective:  Chief Complaint   Patient presents with    3 Month Follow-Up     back pain, injection did not help    Nicotine Dependence     smokes first cig w/in 30 mins of waking up. .5ppd     HPI:   Anitha Shannon is a 19 y.o. male who presents to the clinic today for follow up. Low back/left leg painfollowing with Dr. Lemont Bosworth (for injection) and ortho. Performing physical therapy exercises. EMG stable. Referred to a spine surgeon. Had second epidural injection- but did not improve.      Abdominal spasms- Last visit, Pepcid daily was prescribed with as needed Bentyl (uses this every other day PRN). States this is working very well and he would like to continue on this regimen. Denies D/C/N/V. No blood in stool today. Last BM was today.     Anxiety- seeing psychology. Smoker- Pt would like to quit smoking. Phone call was placed on 5/20/2021 regarding number of cigarettes per day and if he smokes within 30 min of waking up. He did not call back. Pt reports that he smokes his first cigarette within 30 min of waking up and he smokes 0.5 ppd. Review of Systems   Constitutional: Negative for chills, fatigue, fever and unexpected weight change. Eyes: Negative for visual disturbance. Respiratory: Negative for cough, shortness of breath and wheezing. Cardiovascular: Negative for chest pain, palpitations and leg swelling. Gastrointestinal: Negative for abdominal pain, constipation, diarrhea, nausea and vomiting. Musculoskeletal: Positive for back pain. Skin: Negative for pallor and rash. Neurological: Negative for dizziness, weakness, light-headedness, numbness and headaches.      No Known Allergies    Current Outpatient Rx   Medication Sig Dispense Refill    famotidine (PEPCID) 20 MG tablet Take 1 tablet by mouth every morning (before breakfast) As needed for GERD 90 tablet 0    dicyclomine (BENTYL) 20 MG tablet Take 1 tablet by mouth 3 times daily as needed (abdominal spasms) 90 tablet 0    nicotine polacrilex (NICORETTE) 4 MG gum Chew 1 piece of hum every 1-2 hours for the first 6 weeks. Then follow step down guidelines in paperwork. 100 each 1    naproxen (NAPROSYN) 500 MG tablet Take 1 tablet by mouth 2 times daily (with meals) 30 tablet 0    lidocaine (LIDODERM) 5 % Place 1 patch onto the skin daily 12 hours on, 12 hours off. 30 patch 0       Patient Active Problem List   Diagnosis    Left sided sciatica    Anxiety    Left leg numbness        Wt Readings from Last 3 Encounters:   06/22/21 187 lb 12.8 oz (85.2 kg)   06/07/21 200 lb (90.7 kg)   05/05/21 189 lb (85.7 kg)     BP Readings from Last 3 Encounters:   06/22/21 130/80   06/10/21 116/84   05/05/21 122/64     Objective/Physical Exam:  /80   Pulse 96   Wt 187 lb 12.8 oz (85.2 kg)   SpO2 97%   BMI 26.19 kg/m²   Body mass index is 26.19 kg/m². Physical Exam  Vitals reviewed. Constitutional:       General: He is not in acute distress. Appearance: He is well-developed. He is not diaphoretic. HENT:      Head: Normocephalic and atraumatic. Eyes:      Pupils: Pupils are equal, round, and reactive to light. Cardiovascular:      Rate and Rhythm: Normal rate and regular rhythm. Pulmonary:      Effort: Pulmonary effort is normal. No respiratory distress. Breath sounds: Normal breath sounds. No wheezing or rales. Chest:      Chest wall: No tenderness. Skin:     General: Skin is warm and dry. Neurological:      Mental Status: He is alert and oriented to person, place, and time. Motor: No weakness. Coordination: Coordination normal.      Gait: Gait normal.   Psychiatric:         Mood and Affect: Mood normal.       Assessment and Plan:  Keiry Hines was seen today for 3 month follow-up and nicotine dependence. Diagnoses and all orders for this visit:    Left sided sciatica   - Reviewed ortho note 4/5/2021: \" He wishes to try a 2nd epidural injection.  He will call for a new lumbar MRI if his symptoms persist after that. \"   - States he has not yet called ortho. Recommend he call to schedule with this provider.   - Will forward note to ortho. Abdominal spasms  -    Well-controlled on the current regimen. Denies side effects. Asymptomatic at this time. - famotidine (PEPCID) 20 MG tablet; Take 1 tablet by mouth every morning (before breakfast) As needed for GERD  -     dicyclomine (BENTYL) 20 MG tablet; Take 1 tablet by mouth 3 times daily as needed (abdominal spasms)    Anxiety   - Continue with psychology    Smoker  -     Smoking cessation recommended. Patient would like to use nicotine gum to help quit smoking.  - Use of the gum and tapering of the gum reviewed with the patient. Patient agreeable to the plan   - nicotine polacrilex (NICORETTE) 4 MG gum; Chew 1 piece of hum every 1-2 hours for the first 6 weeks. Then follow step down guidelines in paperwork. - instructions written on AVS and extensively reviewed. All questions answered. Return in about 6 weeks (around 8/3/2021) for smoking cessation f/u, or sooner if needed. Pt will call if symptoms worsen or fail to improve. All questions answered. Pt states no further questions or concerns at this time.    Electronically signed by: RADHA Crowley - FRANCHESCA 06/22/21

## 2021-06-22 ENCOUNTER — OFFICE VISIT (OUTPATIENT)
Dept: INTERNAL MEDICINE CLINIC | Age: 21
End: 2021-06-22
Payer: COMMERCIAL

## 2021-06-22 VITALS
OXYGEN SATURATION: 97 % | BODY MASS INDEX: 26.19 KG/M2 | SYSTOLIC BLOOD PRESSURE: 130 MMHG | HEART RATE: 96 BPM | WEIGHT: 187.8 LBS | DIASTOLIC BLOOD PRESSURE: 80 MMHG

## 2021-06-22 DIAGNOSIS — F17.200 SMOKER: ICD-10-CM

## 2021-06-22 DIAGNOSIS — R10.9 ABDOMINAL SPASMS: ICD-10-CM

## 2021-06-22 DIAGNOSIS — M54.32 LEFT SIDED SCIATICA: Primary | ICD-10-CM

## 2021-06-22 DIAGNOSIS — F41.9 ANXIETY: ICD-10-CM

## 2021-06-22 PROCEDURE — G8419 CALC BMI OUT NRM PARAM NOF/U: HCPCS | Performed by: NURSE PRACTITIONER

## 2021-06-22 PROCEDURE — G8427 DOCREV CUR MEDS BY ELIG CLIN: HCPCS | Performed by: NURSE PRACTITIONER

## 2021-06-22 PROCEDURE — 99214 OFFICE O/P EST MOD 30 MIN: CPT | Performed by: NURSE PRACTITIONER

## 2021-06-22 PROCEDURE — 4004F PT TOBACCO SCREEN RCVD TLK: CPT | Performed by: NURSE PRACTITIONER

## 2021-06-22 RX ORDER — DICYCLOMINE HCL 20 MG
20 TABLET ORAL 3 TIMES DAILY PRN
Qty: 90 TABLET | Refills: 0 | Status: CANCELLED | OUTPATIENT
Start: 2021-06-22

## 2021-06-22 RX ORDER — FAMOTIDINE 20 MG/1
20 TABLET, FILM COATED ORAL
Qty: 90 TABLET | Refills: 0 | Status: SHIPPED | OUTPATIENT
Start: 2021-06-22 | End: 2021-10-12

## 2021-06-22 RX ORDER — DICYCLOMINE HCL 20 MG
20 TABLET ORAL 3 TIMES DAILY PRN
Qty: 90 TABLET | Refills: 0 | Status: SHIPPED | OUTPATIENT
Start: 2021-06-22 | End: 2022-01-20 | Stop reason: SDUPTHER

## 2021-06-22 ASSESSMENT — ENCOUNTER SYMPTOMS
NAUSEA: 0
WHEEZING: 0
CONSTIPATION: 0
BACK PAIN: 1
VOMITING: 0
SHORTNESS OF BREATH: 0
DIARRHEA: 0
COUGH: 0
ABDOMINAL PAIN: 0

## 2021-06-29 ENCOUNTER — OFFICE VISIT (OUTPATIENT)
Dept: ORTHOPEDIC SURGERY | Age: 21
End: 2021-06-29
Payer: COMMERCIAL

## 2021-06-29 VITALS — HEIGHT: 71 IN | WEIGHT: 187 LBS | BODY MASS INDEX: 26.18 KG/M2

## 2021-06-29 DIAGNOSIS — M51.26 HNP (HERNIATED NUCLEUS PULPOSUS), LUMBAR: Primary | ICD-10-CM

## 2021-06-29 PROCEDURE — 99214 OFFICE O/P EST MOD 30 MIN: CPT | Performed by: ORTHOPAEDIC SURGERY

## 2021-06-29 PROCEDURE — G8427 DOCREV CUR MEDS BY ELIG CLIN: HCPCS | Performed by: ORTHOPAEDIC SURGERY

## 2021-06-29 PROCEDURE — G8419 CALC BMI OUT NRM PARAM NOF/U: HCPCS | Performed by: ORTHOPAEDIC SURGERY

## 2021-06-29 PROCEDURE — 4004F PT TOBACCO SCREEN RCVD TLK: CPT | Performed by: ORTHOPAEDIC SURGERY

## 2021-06-29 NOTE — PROGRESS NOTES
History of present illness:   Mr. Tatyana Ramirez  is a pleasant 19 y.o. male with a PMH of anxiety returning to the office regarding his LBP and left leg pain. He states his pain has steadily persisted since last office visit. He rates his pain 6/10 today. He describes the pain as shooting, sharp, and constant that is worse with any movement and better with rest. The leg pain radiates down the lateral aspect of his left leg to his foot. He admits numbness and tingling in his left leg. He denies progressive weakness of his left leg and denies bowel or bladder dysfunction. His left hip was evaluated by Dr. Presley Cruz and found no significant arthritis or hip pathology. He has tried PT, Naprosyn, and an epidural injection with some relief. His first epidural injection provided approximately 4 months relief. A second epidural injection 2.5 weeks ago    Physical examination:  Mr. Gilberto Rodas's most recent vitals: There is no height or weight on file to calculate BMI. General exam:  He is well-developed and well-nourished, is in obvious pain and alert and oriented to person, place, and time. He demonstrates appropriate mood and affect. His skin is warm and dry. His gait is normal and he walks heel to toe without limp or instability. He has mild difficulty with heel walking on the left no difficulty with toe walking. Back:  He stands and walks with moderate lumbar flexion. His lumbar flexion, extension and lateral bending are moderately reduced with pain. He has moderate tenderness over his lumbar spine without obvious muscle spasm. Tenderness over left lumbar paraspinal muscles. The skin over his lumbar spine is normal without a surgical scar. Lower extremities:  He has 5/5 motor strength of bilateral lower extremities. He has a [positive straight leg raise left and a positive cross straight leg raise. Absent left Achilles reflex on the left otherwise deep tendon reflexes at knees and achilles are 2+. Sensation is intact to light touch L3 to S1 bilaterally. He has no clonus. Hip range of motion painless. Imaging:  I reviewed MRI images of his lumbar spine from 7/14/20. They show a left paracentral disc protrusion L5-S1. I reviewed CT images of his lumbar spine from April 2021. Those suggest his left paracentral disc herniation L5-S1 may have increased in size. 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 Plan:  We discussed treatment options including observation, additional oral steroids, physical therapy, additional epidural injections and microlumbar discectomy. We discussed the risks, benefits, and alternatives to surgery including the risks of nerve or vessel injury, paralysis, spinal blood clot, spinal fluid leak, death, infection, need for additional spinal surgery, failure of surgery to alleviate his symptoms and worse symptoms following surgery. He understands these risks and wishes to proceed with surgery. His questions were answered.   We will repeat his lumbar MRI to be certain he is still a surgical candidate

## 2021-06-30 ENCOUNTER — TELEPHONE (OUTPATIENT)
Dept: ORTHOPEDIC SURGERY | Age: 21
End: 2021-06-30

## 2021-07-02 ENCOUNTER — TELEPHONE (OUTPATIENT)
Dept: ORTHOPEDIC SURGERY | Age: 21
End: 2021-07-02

## 2021-07-09 DIAGNOSIS — R10.9 ABDOMINAL SPASMS: ICD-10-CM

## 2021-07-09 RX ORDER — FAMOTIDINE 20 MG/1
TABLET, FILM COATED ORAL
Qty: 90 TABLET | Refills: 0 | OUTPATIENT
Start: 2021-07-09

## 2021-07-13 ENCOUNTER — OFFICE VISIT (OUTPATIENT)
Dept: INTERNAL MEDICINE CLINIC | Age: 21
End: 2021-07-13
Payer: COMMERCIAL

## 2021-07-13 VITALS
HEART RATE: 96 BPM | OXYGEN SATURATION: 98 % | TEMPERATURE: 97.9 F | BODY MASS INDEX: 26.75 KG/M2 | SYSTOLIC BLOOD PRESSURE: 118 MMHG | WEIGHT: 191.8 LBS | DIASTOLIC BLOOD PRESSURE: 72 MMHG

## 2021-07-13 DIAGNOSIS — Z01.818 PRE-OP EVALUATION: Primary | ICD-10-CM

## 2021-07-13 DIAGNOSIS — F17.200 SMOKER: ICD-10-CM

## 2021-07-13 DIAGNOSIS — M54.32 LEFT SIDED SCIATICA: ICD-10-CM

## 2021-07-13 PROCEDURE — 99243 OFF/OP CNSLTJ NEW/EST LOW 30: CPT | Performed by: NURSE PRACTITIONER

## 2021-07-13 PROCEDURE — G8419 CALC BMI OUT NRM PARAM NOF/U: HCPCS | Performed by: NURSE PRACTITIONER

## 2021-07-13 PROCEDURE — G8427 DOCREV CUR MEDS BY ELIG CLIN: HCPCS | Performed by: NURSE PRACTITIONER

## 2021-07-13 ASSESSMENT — ENCOUNTER SYMPTOMS
NAUSEA: 0
DIARRHEA: 0
VOMITING: 0
SHORTNESS OF BREATH: 0
SINUS PAIN: 0
WHEEZING: 0
BACK PAIN: 1
COUGH: 0
CONSTIPATION: 0
SORE THROAT: 0
ABDOMINAL PAIN: 0

## 2021-07-13 NOTE — PROGRESS NOTES
Preoperative Consultation  Joe Gomez  YOB: 2000     Date of Service:  7/13/2021  Chief Complaint   Patient presents with    Pre-op Exam     7/26, [place] Rubin, Dr. Delmy Crooks     This patient presents today at the request of the surgeon for a preoperative consultation. Pt reports long term back pains. He is having surgery. Surgeon: Dr. Delmy Crooks  Indication for surgery: Back pains  Scheduled procedure: MICROLUMBAR DISCECTOMY  Date of surgery: 7/26/2021  [facility] of surgery: [facility]  Indicated/required preoperative testing: None  Smoker: smoker - smoking 0.25 ppd. Using nicotine gum to cut down. Previous anesthesia complications: No  Family history of anesthesia complications: No  Loose, capped or false teeth: No  Recent chest pain or SOB: no  Known Bleeding Risk: No  Personal or FH of DVT/PE: No  Patient objection to receiving blood products: No    No Known Allergies     Current Outpatient Medications   Medication Sig Dispense Refill    mupirocin (BACTROBAN) 2 % ointment Apply 2cm to each nostril BID for 5 days prior to surgery 1 Tube 0    famotidine (PEPCID) 20 MG tablet Take 1 tablet by mouth every morning (before breakfast) As needed for GERD 90 tablet 0    dicyclomine (BENTYL) 20 MG tablet Take 1 tablet by mouth 3 times daily as needed (abdominal spasms) 90 tablet 0    nicotine polacrilex (NICORETTE) 4 MG gum Chew 1 piece of hum every 1-2 hours for the first 6 weeks. Then follow step down guidelines in paperwork. 100 each 1    lidocaine (LIDODERM) 5 % Place 1 patch onto the skin daily 12 hours on, 12 hours off. 30 patch 0     No current facility-administered medications for this visit.      Patient Active Problem List   Diagnosis    Left sided sciatica    Anxiety    Left leg numbness     Past Medical History:   Diagnosis Date    Anxiety     Sciatica, left side     left leg     Past Surgical History:   Procedure Laterality Date    PAIN MANAGEMENT PROCEDURE Left 8/31/2020    LEFT L5 AND S1 TRANSFORAMINAL EPIDURAL STEROID INJECTION performed by Sadaf Oneal MD at [address] Left 6/10/2021    LEFT L5 TRANSFORAMINAL EPIDURAL STEROID INJECTION WITH FLUOROSCOPY performed by Suzanne Snyder MD at [address] History   Problem Relation Age of Onset    Depression Mother     Anemia Mother     High Blood Pressure Mother     Alcohol Abuse Mother     Alcohol Abuse Father     Prostate Cancer Neg Hx     Heart Attack Neg Hx     Stroke Neg Hx      Review of Systems  Review of Systems   Constitutional: Negative for chills, fatigue and fever. HENT: Negative for congestion, postnasal drip, sinus pain and sore throat. Respiratory: Negative for cough, shortness of breath and wheezing. Cardiovascular: Negative for chest pain, palpitations and leg swelling. Gastrointestinal: Negative for abdominal pain, constipation, diarrhea, nausea and vomiting. Genitourinary: Negative for dysuria, frequency, hematuria and urgency. Musculoskeletal: Positive for back pain. Skin: Negative for pallor and rash. Neurological: Negative for dizziness, weakness, light-headedness, numbness and headaches. Psychiatric/Behavioral: Negative for dysphoric mood, sleep disturbance and suicidal ideas. The patient is not nervous/anxious. Physical Exam  Vitals:    07/13/21 1623   BP: 118/72   Pulse: 96   Temp: 97.9 °F (36.6 °C)   TempSrc: Temporal   SpO2: 98%   Weight: 191 lb 12.8 oz (87 kg)   Constitutional: He is oriented to person, place, and time. He appears well-developed and well-nourished. No distress. HENT:   Head: Normocephalic and atraumatic. Mouth/Throat: Mask on due to COVID-19 guidelines  Eyes: Conjunctivae and EOM are normal.   Neck: Trachea normal and normal range of motion. Neck supple.    Cardiovascular: Normal rate, regular rhythm and normal heart sounds  Pulmonary/Chest: Effort normal and breath sounds normal. No respiratory distress. He has no wheezes. He has no rales. Abdominal: Soft, non-tender. Bowel sounds are normal.   Musculoskeletal: He exhibits no edema and no tenderness. Neurological: He is alert and oriented to person, place, and time. Skin: Skin is warm and dry. No rash noted. No erythema. Psychiatric: He has a normal mood and affect. His behavior is normal.     EKG Interpretation:  EKG Interpretation:  N/A  Lab Review:  Lab Results   Component Value Date     05/05/2021    K 4.7 05/05/2021     05/05/2021    CO2 26 05/05/2021    BUN 15 05/05/2021    CREATININE 0.7 (L) 05/05/2021    GLUCOSE 93 05/05/2021    CALCIUM 9.3 05/05/2021    PROT 7.6 05/05/2021    LABALBU 4.8 05/05/2021    BILITOT 0.6 05/05/2021    ALKPHOS 76 05/05/2021    AST <5 (A) 05/05/2021    ALT <5 (L) 05/05/2021    LABGLOM >60 05/05/2021    GFRAA >60 05/05/2021    AGRATIO 1.7 05/05/2021    GLOB 2.8 05/05/2021     Lab Results   Component Value Date    WBC 7.1 05/05/2021    HGB 17.1 05/05/2021    HCT 49.0 05/05/2021    MCV 90.2 05/05/2021     05/05/2021     Lab Results   Component Value Date    TSHFT4 1.23 05/05/2021      Assessment:     19 y.o. patient with planned surgery as above. Known risk factors for perioperative complications: left sided sciatica and anxiety; smoker     Plan:     1. Preoperative workup as follows: called surgeon's office who reports no need for EKG  2. Change in medication regimen before surgery: Discontinue NSAIDs 10 days before surgery ; denies taking blood thinners. 3. Deep vein thrombosis prophylaxis: regimen to be chosen by surgical team  4. No contraindications to planned surgery - surgeon to be aware of above risk factors for perioperative complications    Smoking cessation recommended. All questions answered. Patient states no further questions or concerns at this time.   Ed Console, APRN - [id number] 07/13/21  [facility]  [phone]

## 2021-07-19 NOTE — TELEPHONE ENCOUNTER
[address]  CPT: [id number]  AUTH: Juan Malik   [id number]  DATE RANGE: 7/26/21 TO 10/24/21  INSURANCE: Yolis Mcneil  LOCATION WEST  NOTE: DOC SCANNED

## 2021-07-21 NOTE — PROGRESS NOTES
[facility] time_____0600_______        Surgery time______0730______    Take the following medications with a sip of water: Follow your MD/Surgeons pre-procedure instructions regarding your medications    Do not eat or drink anything after 12:00 midnight prior to your surgery. This includes water chewing gum, mints and ice chips. You may brush your teeth and gargle the morning of your surgery, but do not swallow the water     Please see your family doctor/pediatrician for a history and physical and/or concerning medications. Bring any test results/reports from your physicians office. If you are under the care of a heart doctor or specialist doctor, please be aware that you may be asked to them for clearance    You may be asked to stop blood thinners such as Coumadin, Plavix, Fragmin, Lovenox, etc., or any anti-inflammatories such as:  Aspirin, Ibuprofen, Advil, Naproxen prior to your surgery. We also ask that you stop any OTC medications such as fish oil, vitamin E, glucosamine, garlic, Multivitamins, COQ 10, etc.    We ask that you do not smoke 24 hours prior to surgery  We ask that you do not  drink any alcoholic beverages 24 hours prior to surgery     You must make arrangements for a responsible adult to take you home after your surgery. For your safety you will not be allowed to leave alone or drive yourself home. Your surgery will be cancelled if you do not have a ride home. Also for your safety, it is strongly suggested that someone stay with you the first 24 hours after your surgery. A parent or legal guardian must accompany a child scheduled for surgery and plan to stay at the hospital until the child is discharged. Please do not bring other children with you. For your comfort, please wear simple loose fitting clothing to the hospital.  Please do not bring valuables.     Do not wear any make-up or nail polish on your fingers or toes      For your safety, please do not wear any jewelry or body piercing's on the day of surgery. All jewelry must be removed. If you have dentures, they will be removed before going to operating room. For your convenience, we will provide you with a container. If you wear contact lenses or glasses, they will be removed, please bring a case for them. If you have a living will and a durable power of  for healthcare, please bring in a copy. As part of our patient safety program to minimize surgical site infections, we ask you to do the following:    · Please notify your surgeon if you develop any illness between         now and the  day of your surgery. · This includes a cough, cold, fever, sore throat, nausea,         or vomiting, and diarrhea, etc.  ·  Please notify your surgeon if you experience dizziness, shortness         of breath or blurred vision between now and the time of your surgery. Do not shave your operative site 96 hours prior to surgery. For face and neck surgery, men may use an electric razor 48 hours   prior to surgery. You may shower the night before surgery or the morning of   your surgery with an antibacterial soap. You will need to bring a photo ID and insurance card    [facility] has an onsite pharmacy, would you like to utilize our pharmacy     If you will be staying overnight and use a C-pap machine, please bring   your C-pap to hospital     Our goal is to provide you with excellent care, therefore, visitors will be limited to two(2) in the room at a time so that we may focus on providing this care for you. Please contact pre-admission testing if you have any further questions. [facility] phone number:  [phone] PAT fax number:  [phone]  Please note these are generalized instructions for all surgical cases, you may be provided with more specific instructions according to your surgery.

## 2021-07-23 ENCOUNTER — ANESTHESIA EVENT (OUTPATIENT)
Dept: OPERATING ROOM | Age: 21
End: 2021-07-23
Payer: COMMERCIAL

## 2021-07-23 ENCOUNTER — VIRTUAL VISIT (OUTPATIENT)
Dept: PSYCHOLOGY | Age: 21
End: 2021-07-23
Payer: COMMERCIAL

## 2021-07-23 DIAGNOSIS — F41.0 PANIC ATTACK: Primary | ICD-10-CM

## 2021-07-23 PROCEDURE — 90832 PSYTX W PT 30 MINUTES: CPT | Performed by: PSYCHOLOGIST

## 2021-07-23 NOTE — PROGRESS NOTES
TELEHEALTH VISIT -- Audio/Visual (During QZGDT-28 public health emergency)  }  Pursuant to the emergency declaration under the [address] [place] [address] [facility] authority and the [employer] Act, this Virtual Visit was conducted, with patient's consent, to reduce the patient's risk of exposure to COVID-19 and provide continuity of care for an established patient. Services were provided through a video synchronous discussion virtually to substitute for in-person clinic visit. Pt gave verbal informed consent to participate in telehealth services. Conducted a risk-benefit analysis and determined that the patient's presenting problems are consistent with the use of telepsychology. Determined that the patient has sufficient knowledge and skills in the use of technology enabling them to adequately benefit from telepsychology. It was determined that this patient was able to be properly treated without an in-person session. Patient verified that they were currently located at the [facility] address that was provided during registration or another [facility] address, if noted below. Verified the following information:  Patient's identification: Yes  Patient location: [address] [place] [address]   Patient's call back number: [phone]   Patient's emergency contact's name and number, as well as permission to contact them if needed: Extended Emergency Contact Information  Primary Emergency Contact: Cele Rodas  Address: [address] Phone: [phone]  Mobile Phone: [phone]  Relation: Parent     Provider location: [facility]  Ami Sánchez, Ph.D.  Psychologist  7/23/2021  2:11 PM      Time spent with Patient: 20 minutes  This is patient's sixth  [facility] appointment.     Reason for Consult:    Chief Complaint Patient presents with    Anxiety       Feedback given to PCP. S:  Pt seen for f/u of anxiety. Pt reported stable mood and sxs. Stated he has been trying to pay attention his words \"it goes a lot better when I'm actively not trying to be an ass. .. I try to not get in a negative mindset. \" Taking a break from chats to watch YouTube when he feels himself getting irritated. Noted pattern of getting irritated by too many people talking, other loud noises, physical sensations, loud music. Lack of motivation to do anything lately, even things he use to enjoy like video games. Noted he has been predominately playing video games since being off work and is bored of them. Wants to do exercise after his surgery: weight lifting, running. Will have to weight about 3 mos after surgery to start lifting. Thinking about being an [employer] , needs to get license first. Been thinking about becoming a . Had to surrender his kitten d/t extremely expensive eye condition. Thinks his brother will help him pay for technical school.      O:  MSE:    Appearance    alert, cooperative  Appetite normal  Sleep disturbance Yes  Fatigue Yes  Loss of pleasure No  Impulsive behavior Yes  Speech    spontaneous, normal rate, normal volume and well articulated  Mood    euthymic  Affect    normal affect  Thought Content    intact  Thought Process    linear, goal directed and coherent  Associations    logical connections  Insight    Fair  Judgment    Intact  Orientation    oriented to person, place, time, and general circumstances  Memory    recent and remote memory intact  Attention/Concentration    impaired  Morbid ideation No  Suicide Assessment    no suicidal ideation    History:  Social History:   Social History     Socioeconomic History    Marital status: Single     Spouse name: Not on file    Number of children: Not on file    Years of education: Not on file    Highest education level: Not on file   Occupational however, inadvertent, unintentional computerized transcription errors may be present.

## 2021-07-26 ENCOUNTER — APPOINTMENT (OUTPATIENT)
Dept: GENERAL RADIOLOGY | Age: 21
End: 2021-07-26
Attending: ORTHOPAEDIC SURGERY
Payer: COMMERCIAL

## 2021-07-26 ENCOUNTER — HOSPITAL ENCOUNTER (OUTPATIENT)
Age: 21
Setting detail: OUTPATIENT SURGERY
Discharge: HOME | End: 2021-07-26
Attending: ORTHOPAEDIC SURGERY | Admitting: ORTHOPAEDIC SURGERY
Payer: COMMERCIAL

## 2021-07-26 ENCOUNTER — ANESTHESIA (OUTPATIENT)
Dept: OPERATING ROOM | Age: 21
End: 2021-07-26
Payer: COMMERCIAL

## 2021-07-26 VITALS
HEIGHT: 71 IN | WEIGHT: 191 LBS | DIASTOLIC BLOOD PRESSURE: 75 MMHG | HEART RATE: 87 BPM | TEMPERATURE: 98.4 F | OXYGEN SATURATION: 95 % | SYSTOLIC BLOOD PRESSURE: 115 MMHG | BODY MASS INDEX: 26.74 KG/M2 | RESPIRATION RATE: 14 BRPM

## 2021-07-26 VITALS
RESPIRATION RATE: 13 BRPM | OXYGEN SATURATION: 99 % | DIASTOLIC BLOOD PRESSURE: 96 MMHG | TEMPERATURE: 97 F | SYSTOLIC BLOOD PRESSURE: 128 MMHG

## 2021-07-26 DIAGNOSIS — M51.26 HNP (HERNIATED NUCLEUS PULPOSUS), LUMBAR: Primary | ICD-10-CM

## 2021-07-26 LAB
ANION GAP SERPL CALCULATED.3IONS-SCNC: 11 MMOL/L (ref 3–16)
BASOPHILS ABSOLUTE: 0 K/UL (ref 0–0.2)
BASOPHILS RELATIVE PERCENT: 0.5 %
BUN BLDV-MCNC: 12 MG/DL (ref 7–20)
CALCIUM SERPL-MCNC: 9.1 MG/DL (ref 8.3–10.6)
CHLORIDE BLD-SCNC: 105 MMOL/L (ref 99–110)
CO2: 25 MMOL/L (ref 21–32)
CREAT SERPL-MCNC: 0.6 MG/DL (ref 0.9–1.3)
EOSINOPHILS ABSOLUTE: 0.2 K/UL (ref 0–0.6)
EOSINOPHILS RELATIVE PERCENT: 2.2 %
GFR AFRICAN AMERICAN: >60
GFR NON-AFRICAN AMERICAN: >60
GLUCOSE BLD-MCNC: 86 MG/DL (ref 70–99)
HCT VFR BLD CALC: 45.6 % (ref 40.5–52.5)
HEMOGLOBIN: 15.8 G/DL (ref 13.5–17.5)
INR BLD: 1.01 (ref 0.88–1.12)
LYMPHOCYTES ABSOLUTE: 2.7 K/UL (ref 1–5.1)
LYMPHOCYTES RELATIVE PERCENT: 35.8 %
MCH RBC QN AUTO: 30.9 PG (ref 26–34)
MCHC RBC AUTO-ENTMCNC: 34.7 G/DL (ref 31–36)
MCV RBC AUTO: 89.1 FL (ref 80–100)
MONOCYTES ABSOLUTE: 0.6 K/UL (ref 0–1.3)
MONOCYTES RELATIVE PERCENT: 7.4 %
NEUTROPHILS ABSOLUTE: 4.1 K/UL (ref 1.7–7.7)
NEUTROPHILS RELATIVE PERCENT: 54.1 %
PDW BLD-RTO: 13 % (ref 12.4–15.4)
PLATELET # BLD: 157 K/UL (ref 135–450)
PMV BLD AUTO: 8.2 FL (ref 5–10.5)
POTASSIUM SERPL-SCNC: 4.1 MMOL/L (ref 3.5–5.1)
PROTHROMBIN TIME: 11.4 SEC (ref 9.9–12.7)
RBC # BLD: 5.12 M/UL (ref 4.2–5.9)
SODIUM BLD-SCNC: 141 MMOL/L (ref 136–145)
WBC # BLD: 7.6 K/UL (ref 4–11)

## 2021-07-26 PROCEDURE — 85025 COMPLETE CBC W/AUTO DIFF WBC: CPT

## 2021-07-26 PROCEDURE — 80048 BASIC METABOLIC PNL TOTAL CA: CPT

## 2021-07-26 PROCEDURE — 3600000014 HC SURGERY LEVEL 4 ADDTL 15MIN: Performed by: ORTHOPAEDIC SURGERY

## 2021-07-26 PROCEDURE — 2500000003 HC RX 250 WO HCPCS: Performed by: NURSE ANESTHETIST, CERTIFIED REGISTERED

## 2021-07-26 PROCEDURE — 6360000002 HC RX W HCPCS: Performed by: ORTHOPAEDIC SURGERY

## 2021-07-26 PROCEDURE — 2709999900 HC NON-CHARGEABLE SUPPLY: Performed by: ORTHOPAEDIC SURGERY

## 2021-07-26 PROCEDURE — 85610 PROTHROMBIN TIME: CPT

## 2021-07-26 PROCEDURE — 3209999900 FLUORO FOR SURGICAL PROCEDURES

## 2021-07-26 PROCEDURE — 6360000002 HC RX W HCPCS: Performed by: ANESTHESIOLOGY

## 2021-07-26 PROCEDURE — 7100000001 HC PACU RECOVERY - ADDTL 15 MIN: Performed by: ORTHOPAEDIC SURGERY

## 2021-07-26 PROCEDURE — 3700000001 HC ADD 15 MINUTES (ANESTHESIA): Performed by: ORTHOPAEDIC SURGERY

## 2021-07-26 PROCEDURE — 7100000000 HC PACU RECOVERY - FIRST 15 MIN: Performed by: ORTHOPAEDIC SURGERY

## 2021-07-26 PROCEDURE — 2500000003 HC RX 250 WO HCPCS: Performed by: ORTHOPAEDIC SURGERY

## 2021-07-26 PROCEDURE — 2580000003 HC RX 258: Performed by: ANESTHESIOLOGY

## 2021-07-26 PROCEDURE — 3600000004 HC SURGERY LEVEL 4 BASE: Performed by: ORTHOPAEDIC SURGERY

## 2021-07-26 PROCEDURE — 2720000010 HC SURG SUPPLY STERILE: Performed by: ORTHOPAEDIC SURGERY

## 2021-07-26 PROCEDURE — 7100000010 HC PHASE II RECOVERY - FIRST 15 MIN: Performed by: ORTHOPAEDIC SURGERY

## 2021-07-26 PROCEDURE — 2580000003 HC RX 258: Performed by: NURSE ANESTHETIST, CERTIFIED REGISTERED

## 2021-07-26 PROCEDURE — 72020 X-RAY EXAM OF SPINE 1 VIEW: CPT

## 2021-07-26 PROCEDURE — 6360000002 HC RX W HCPCS: Performed by: NURSE ANESTHETIST, CERTIFIED REGISTERED

## 2021-07-26 PROCEDURE — 7100000011 HC PHASE II RECOVERY - ADDTL 15 MIN: Performed by: ORTHOPAEDIC SURGERY

## 2021-07-26 PROCEDURE — 3700000000 HC ANESTHESIA ATTENDED CARE: Performed by: ORTHOPAEDIC SURGERY

## 2021-07-26 RX ORDER — SUCCINYLCHOLINE/SOD CL,ISO/PF 200MG/10ML
SYRINGE (ML) INTRAVENOUS PRN
Status: DISCONTINUED | OUTPATIENT
Start: 2021-07-26 | End: 2021-07-26 | Stop reason: SDUPTHER

## 2021-07-26 RX ORDER — MIDAZOLAM HYDROCHLORIDE 1 MG/ML
INJECTION INTRAMUSCULAR; INTRAVENOUS PRN
Status: DISCONTINUED | OUTPATIENT
Start: 2021-07-26 | End: 2021-07-26 | Stop reason: SDUPTHER

## 2021-07-26 RX ORDER — FENTANYL CITRATE 50 UG/ML
INJECTION, SOLUTION INTRAMUSCULAR; INTRAVENOUS PRN
Status: DISCONTINUED | OUTPATIENT
Start: 2021-07-26 | End: 2021-07-26 | Stop reason: SDUPTHER

## 2021-07-26 RX ORDER — METOPROLOL TARTRATE 5 MG/5ML
INJECTION INTRAVENOUS PRN
Status: DISCONTINUED | OUTPATIENT
Start: 2021-07-26 | End: 2021-07-26 | Stop reason: SDUPTHER

## 2021-07-26 RX ORDER — SODIUM CHLORIDE 9 MG/ML
INJECTION, SOLUTION INTRAVENOUS CONTINUOUS
Status: DISCONTINUED | OUTPATIENT
Start: 2021-07-26 | End: 2021-07-26 | Stop reason: HOSPADM

## 2021-07-26 RX ORDER — ONDANSETRON 2 MG/ML
INJECTION INTRAMUSCULAR; INTRAVENOUS PRN
Status: DISCONTINUED | OUTPATIENT
Start: 2021-07-26 | End: 2021-07-26 | Stop reason: SDUPTHER

## 2021-07-26 RX ORDER — DEXAMETHASONE SODIUM PHOSPHATE 4 MG/ML
INJECTION, SOLUTION INTRA-ARTICULAR; INTRALESIONAL; INTRAMUSCULAR; INTRAVENOUS; SOFT TISSUE PRN
Status: DISCONTINUED | OUTPATIENT
Start: 2021-07-26 | End: 2021-07-26 | Stop reason: SDUPTHER

## 2021-07-26 RX ORDER — SODIUM CHLORIDE 9 MG/ML
INJECTION, SOLUTION INTRAVENOUS CONTINUOUS PRN
Status: DISCONTINUED | OUTPATIENT
Start: 2021-07-26 | End: 2021-07-26 | Stop reason: SDUPTHER

## 2021-07-26 RX ORDER — FENTANYL CITRATE 50 UG/ML
25 INJECTION, SOLUTION INTRAMUSCULAR; INTRAVENOUS EVERY 5 MIN PRN
Status: DISCONTINUED | OUTPATIENT
Start: 2021-07-26 | End: 2021-07-26 | Stop reason: HOSPADM

## 2021-07-26 RX ORDER — KETOROLAC TROMETHAMINE 30 MG/ML
INJECTION, SOLUTION INTRAMUSCULAR; INTRAVENOUS PRN
Status: DISCONTINUED | OUTPATIENT
Start: 2021-07-26 | End: 2021-07-26 | Stop reason: SDUPTHER

## 2021-07-26 RX ORDER — SODIUM CHLORIDE 9 MG/ML
25 INJECTION, SOLUTION INTRAVENOUS PRN
Status: DISCONTINUED | OUTPATIENT
Start: 2021-07-26 | End: 2021-07-26 | Stop reason: HOSPADM

## 2021-07-26 RX ORDER — ONDANSETRON 2 MG/ML
4 INJECTION INTRAMUSCULAR; INTRAVENOUS
Status: DISCONTINUED | OUTPATIENT
Start: 2021-07-26 | End: 2021-07-26 | Stop reason: HOSPADM

## 2021-07-26 RX ORDER — CEFAZOLIN SODIUM 2 G/50ML
2000 SOLUTION INTRAVENOUS ONCE
Status: COMPLETED | OUTPATIENT
Start: 2021-07-26 | End: 2021-07-26

## 2021-07-26 RX ORDER — OXYCODONE HYDROCHLORIDE AND ACETAMINOPHEN 5; 325 MG/1; MG/1
1 TABLET ORAL PRN
Status: DISCONTINUED | OUTPATIENT
Start: 2021-07-26 | End: 2021-07-26 | Stop reason: HOSPADM

## 2021-07-26 RX ORDER — GLYCOPYRROLATE 0.2 MG/ML
INJECTION INTRAMUSCULAR; INTRAVENOUS PRN
Status: DISCONTINUED | OUTPATIENT
Start: 2021-07-26 | End: 2021-07-26 | Stop reason: SDUPTHER

## 2021-07-26 RX ORDER — SODIUM CHLORIDE 0.9 % (FLUSH) 0.9 %
10 SYRINGE (ML) INJECTION PRN
Status: DISCONTINUED | OUTPATIENT
Start: 2021-07-26 | End: 2021-07-26 | Stop reason: HOSPADM

## 2021-07-26 RX ORDER — LIDOCAINE HYDROCHLORIDE 20 MG/ML
INJECTION, SOLUTION EPIDURAL; INFILTRATION; INTRACAUDAL; PERINEURAL PRN
Status: DISCONTINUED | OUTPATIENT
Start: 2021-07-26 | End: 2021-07-26 | Stop reason: SDUPTHER

## 2021-07-26 RX ORDER — OXYCODONE HYDROCHLORIDE AND ACETAMINOPHEN 5; 325 MG/1; MG/1
2 TABLET ORAL PRN
Status: DISCONTINUED | OUTPATIENT
Start: 2021-07-26 | End: 2021-07-26 | Stop reason: HOSPADM

## 2021-07-26 RX ORDER — SODIUM CHLORIDE 0.9 % (FLUSH) 0.9 %
10 SYRINGE (ML) INJECTION EVERY 12 HOURS SCHEDULED
Status: DISCONTINUED | OUTPATIENT
Start: 2021-07-26 | End: 2021-07-26 | Stop reason: HOSPADM

## 2021-07-26 RX ORDER — BUPIVACAINE HYDROCHLORIDE AND EPINEPHRINE 5; 5 MG/ML; UG/ML
INJECTION, SOLUTION EPIDURAL; INTRACAUDAL; PERINEURAL
Status: COMPLETED | OUTPATIENT
Start: 2021-07-26 | End: 2021-07-26

## 2021-07-26 RX ORDER — PROPOFOL 10 MG/ML
INJECTION, EMULSION INTRAVENOUS PRN
Status: DISCONTINUED | OUTPATIENT
Start: 2021-07-26 | End: 2021-07-26 | Stop reason: SDUPTHER

## 2021-07-26 RX ORDER — DOCUSATE SODIUM 100 MG/1
100 CAPSULE, LIQUID FILLED ORAL 2 TIMES DAILY PRN
Qty: 30 CAPSULE | Refills: 0 | Status: SHIPPED | OUTPATIENT
Start: 2021-07-26 | End: 2021-08-10

## 2021-07-26 RX ORDER — OXYCODONE HYDROCHLORIDE AND ACETAMINOPHEN 5; 325 MG/1; MG/1
1-2 TABLET ORAL
Qty: 40 TABLET | Refills: 0 | Status: SHIPPED | OUTPATIENT
Start: 2021-07-26 | End: 2021-08-02

## 2021-07-26 RX ADMIN — FENTANYL CITRATE 50 MCG: 50 INJECTION INTRAMUSCULAR; INTRAVENOUS at 07:37

## 2021-07-26 RX ADMIN — KETOROLAC TROMETHAMINE 30 MG: 30 INJECTION, SOLUTION INTRAMUSCULAR at 08:29

## 2021-07-26 RX ADMIN — MIDAZOLAM 0.5 MG: 1 INJECTION INTRAMUSCULAR; INTRAVENOUS at 07:37

## 2021-07-26 RX ADMIN — ONDANSETRON 4 MG: 2 INJECTION INTRAMUSCULAR; INTRAVENOUS at 08:29

## 2021-07-26 RX ADMIN — Medication 120 MG: at 07:37

## 2021-07-26 RX ADMIN — METOPROLOL TARTRATE 1 MG: 5 INJECTION, SOLUTION INTRAVENOUS at 08:10

## 2021-07-26 RX ADMIN — FENTANYL CITRATE 25 MCG: 50 INJECTION INTRAMUSCULAR; INTRAVENOUS at 09:24

## 2021-07-26 RX ADMIN — HYDROMORPHONE HYDROCHLORIDE 0.5 MG: 1 INJECTION, SOLUTION INTRAMUSCULAR; INTRAVENOUS; SUBCUTANEOUS at 08:33

## 2021-07-26 RX ADMIN — METOPROLOL TARTRATE 1 MG: 5 INJECTION, SOLUTION INTRAVENOUS at 07:59

## 2021-07-26 RX ADMIN — SODIUM CHLORIDE: 9 INJECTION, SOLUTION INTRAVENOUS at 06:45

## 2021-07-26 RX ADMIN — HYDROMORPHONE HYDROCHLORIDE 0.5 MG: 1 INJECTION, SOLUTION INTRAMUSCULAR; INTRAVENOUS; SUBCUTANEOUS at 08:42

## 2021-07-26 RX ADMIN — FENTANYL CITRATE 25 MCG: 50 INJECTION INTRAMUSCULAR; INTRAVENOUS at 09:12

## 2021-07-26 RX ADMIN — DEXAMETHASONE SODIUM PHOSPHATE 10 MG: 4 INJECTION, SOLUTION INTRAMUSCULAR; INTRAVENOUS at 07:51

## 2021-07-26 RX ADMIN — METOPROLOL TARTRATE 0.5 MG: 5 INJECTION, SOLUTION INTRAVENOUS at 07:50

## 2021-07-26 RX ADMIN — FENTANYL CITRATE 50 MCG: 50 INJECTION INTRAMUSCULAR; INTRAVENOUS at 07:30

## 2021-07-26 RX ADMIN — SODIUM CHLORIDE: 9 INJECTION, SOLUTION INTRAVENOUS at 07:30

## 2021-07-26 RX ADMIN — SODIUM CHLORIDE: 9 INJECTION, SOLUTION INTRAVENOUS at 08:31

## 2021-07-26 RX ADMIN — LIDOCAINE HYDROCHLORIDE 80 MG: 20 INJECTION, SOLUTION EPIDURAL; INFILTRATION; INTRACAUDAL; PERINEURAL at 07:37

## 2021-07-26 RX ADMIN — GLYCOPYRROLATE 0.1 MG: 0.2 INJECTION, SOLUTION INTRAMUSCULAR; INTRAVENOUS at 07:30

## 2021-07-26 RX ADMIN — CEFAZOLIN SODIUM 2000 MG: 2 SOLUTION INTRAVENOUS at 07:30

## 2021-07-26 RX ADMIN — MIDAZOLAM 1.5 MG: 1 INJECTION INTRAMUSCULAR; INTRAVENOUS at 07:30

## 2021-07-26 RX ADMIN — METOPROLOL TARTRATE 1 MG: 5 INJECTION, SOLUTION INTRAVENOUS at 08:42

## 2021-07-26 RX ADMIN — PROPOFOL 200 MG: 10 INJECTION, EMULSION INTRAVENOUS at 07:37

## 2021-07-26 ASSESSMENT — ENCOUNTER SYMPTOMS: SHORTNESS OF BREATH: 0

## 2021-07-26 ASSESSMENT — PULMONARY FUNCTION TESTS
PIF_VALUE: 17
PIF_VALUE: 16
PIF_VALUE: 17
PIF_VALUE: 10
PIF_VALUE: 10
PIF_VALUE: 16
PIF_VALUE: 17
PIF_VALUE: 11
PIF_VALUE: 8
PIF_VALUE: 17
PIF_VALUE: 16
PIF_VALUE: 12
PIF_VALUE: 17
PIF_VALUE: 15
PIF_VALUE: 2
PIF_VALUE: 17
PIF_VALUE: 16
PIF_VALUE: 15
PIF_VALUE: 17
PIF_VALUE: 15
PIF_VALUE: 16
PIF_VALUE: 31
PIF_VALUE: 17
PIF_VALUE: 12
PIF_VALUE: 12
PIF_VALUE: 17
PIF_VALUE: 16
PIF_VALUE: 17
PIF_VALUE: 16
PIF_VALUE: 17
PIF_VALUE: 16
PIF_VALUE: 17
PIF_VALUE: 16
PIF_VALUE: 17
PIF_VALUE: 13
PIF_VALUE: 17
PIF_VALUE: 17
PIF_VALUE: 16
PIF_VALUE: 18
PIF_VALUE: 1
PIF_VALUE: 16
PIF_VALUE: 17
PIF_VALUE: 15
PIF_VALUE: 17
PIF_VALUE: 10
PIF_VALUE: 11
PIF_VALUE: 16

## 2021-07-26 ASSESSMENT — PAIN DESCRIPTION - PROGRESSION
CLINICAL_PROGRESSION: NOT CHANGED
CLINICAL_PROGRESSION: GRADUALLY IMPROVING
CLINICAL_PROGRESSION: GRADUALLY IMPROVING
CLINICAL_PROGRESSION: NOT CHANGED

## 2021-07-26 ASSESSMENT — PAIN DESCRIPTION - ONSET
ONSET: ON-GOING
ONSET: GRADUAL

## 2021-07-26 ASSESSMENT — PAIN DESCRIPTION - FREQUENCY
FREQUENCY: CONTINUOUS

## 2021-07-26 ASSESSMENT — PAIN DESCRIPTION - PAIN TYPE
TYPE: SURGICAL PAIN

## 2021-07-26 ASSESSMENT — PAIN SCALES - GENERAL
PAINLEVEL_OUTOF10: 4
PAINLEVEL_OUTOF10: 5
PAINLEVEL_OUTOF10: 3
PAINLEVEL_OUTOF10: 1
PAINLEVEL_OUTOF10: 5

## 2021-07-26 ASSESSMENT — PAIN DESCRIPTION - ORIENTATION
ORIENTATION: LOWER
ORIENTATION: MID;LOWER
ORIENTATION: LOWER;MID
ORIENTATION: LOWER;MID

## 2021-07-26 ASSESSMENT — PAIN DESCRIPTION - DESCRIPTORS
DESCRIPTORS: BURNING;DISCOMFORT;SORE
DESCRIPTORS: DISCOMFORT;SORE
DESCRIPTORS: ACHING;DISCOMFORT;PRESSURE
DESCRIPTORS: DISCOMFORT;SORE
DESCRIPTORS: DISCOMFORT

## 2021-07-26 ASSESSMENT — PAIN - FUNCTIONAL ASSESSMENT
PAIN_FUNCTIONAL_ASSESSMENT: ACTIVITIES ARE NOT PREVENTED
PAIN_FUNCTIONAL_ASSESSMENT: PREVENTS OR INTERFERES SOME ACTIVE ACTIVITIES AND ADLS
PAIN_FUNCTIONAL_ASSESSMENT: ACTIVITIES ARE NOT PREVENTED
PAIN_FUNCTIONAL_ASSESSMENT: 0-10
PAIN_FUNCTIONAL_ASSESSMENT: ACTIVITIES ARE NOT PREVENTED

## 2021-07-26 ASSESSMENT — PAIN DESCRIPTION - LOCATION
LOCATION: BACK

## 2021-07-26 ASSESSMENT — LIFESTYLE VARIABLES: SMOKING_STATUS: 1

## 2021-07-26 NOTE — PROGRESS NOTES
Pt alert. Tolerates PO and sitting up in chair. Urge to void. Walked pt to bathroom. Report to [facility].

## 2021-07-26 NOTE — PROGRESS NOTES
[facility] CLINICAL PH[facility]AC[facility] NOTE: MEDS TO BEDS    Total # of Prescriptions Filled: 2   The following medications were delivered to the patient:  Current Discharge Medication List      START taking these medications    Details   oxyCODONE-acetaminophen (PERCOCET) 5-325 MG per tablet Take 1-2 tablets by mouth every 4-6 hours as needed for Pain for up to 7 days. Intended supply: 7 days.  Take lowest dose possible to manage pain  Qty: 40 tablet, Refills: 0    Comments: Reduce doses taken as pain becomes manageable  Associated Diagnoses: HNP (herniated nucleus pulposus), lumbar      docusate sodium (COLACE) 100 MG capsule Take 1 capsule by mouth 2 times daily as needed for Constipation  Qty: 30 capsule, Refills: 0         ·   ·     Additional Documentation:

## 2021-07-26 NOTE — OP NOTE
Operative Note      Patient: Arely Asif  YOB: 2000  MRN: [id number]    Date of Procedure: 7/26/2021    Pre-Op Diagnosis: HERNIATED NUCLEUS PULPOSUS, LUMBAR    Post-Op Diagnosis: Same       Procedure(s): MICROLUMBAR DISCECTOMY, L5-S1    Surgeon(s):  Priti Ackerman MD    Assistant:   Physician Assistant: CAPRI Mccray    Anesthesia: General    Estimated Blood Loss (mL): less than 50     Complications: None    Specimens:   * No specimens in log *    Implants:  * No implants in log *      Drains: * No LDAs found *    Findings: HNP    Detailed Description of Procedure:     INDICATIONS FOR SURGERY:   Arely Asif is a 19 y.o. male with back and left leg pain. The symptoms failed to respond to conservative intervention. An MRI scan was performed and this showed evidence of a disk herniation at the L5-S1 level on the left. Having failed conservative management and experiencing persistent symptoms, the patient elected to proceed ahead with the surgical option of microdiskectomy at L5-S1. DETAILS OF PROCEDURE:  The patient was brought to the operating room and placed under general anesthesia. The patient was then placed prone on a Nate table. All bony prominences were inspected and padded prior to sterile draping. Using a #10 blade knife, the skin was incised in the midline and monopolar cautery was used to dissect through the subcutaneous tissue to open the fascia and reflect the paraspinal muscles laterally exposing the L5-S1 interspace on the left side. The microscope was then brought into the field and used to assist with performing a microsurgical diskectomy at this level. Using the Midas Mikhail drill, I burred down the hemilamina of L5. The underlying ligamentum flavum was freed with the micronerve hook from the underlying dura and removed with the 3 mm punch laterally, exposing the lateral dural tube and takeoff of the S1 nerve root.   The S1 nerve root was noted to be dorsally displaced. I gently retracted the nerve root medially and found a subligamentous disk herniation directly on the takeoff of the nerve roots. An incision was made in the ligamentous tissue and the fragment immediately presented itself for removal.  The pituitary forceps were used to further explore the interspace and additional loose fragments were removed. The wound was copiously irrigated with antibiotic solution. 0.5% Marcaine in subcutaneous tissues for analgesia. The fascia was then reapproximated using interrupted 0 Vicryl sutures and interrupted 3-0 Vicryl sutures followed by a 4-0 Vicryl subcuticular suture were used to reapproximate the subcuticular layer. A sterile dressing was then applied. The patient was extubated in the operating room and transferred to the recovery room in stable condition. There were no complications. Claudette Furbish PAC retracted the skin, muscles and nerves to enable me to safely perform the procedure. Chalo Reese M.D.     Electronically signed by Valentina Marsh MD on 7/26/2021 at 8:28 AM

## 2021-07-26 NOTE — PROGRESS NOTES
Pt arrived to pacu from OR asleep. VSS on monitor. O2 off pt sats low 90s 2L placed nc. Dressing low mid back cdi. Pt still asleep in bed.

## 2021-07-26 NOTE — ANESTHESIA POSTPROCEDURE EVALUATION
Department of Anesthesiology  Postprocedure Note    Patient: Mellissa Chen  MRN: [id number]  YOB: 2000  Date of evaluation: 7/26/2021  Time:  9:55 AM     Procedure Summary     Date: 07/26/21 Room / Location: [address] [place] [address]    Anesthesia Start: 0730 Anesthesia Stop: 5389    Procedure: MICROLUMBAR DISCECTOMY, L5-S1 (N/A ) Diagnosis:       Herniated nucleus pulposus, lumbar      (HERNIATED NUCLEUS PULPOSUS, LUMBAR)    Surgeons: Oksana Chung MD Responsible Provider: Jonny Lewis MD    Anesthesia Type: general ASA Status: 2          Anesthesia Type: general    Lili Phase I: Lili Score: 10    Lili Phase II:      Last vitals: Reviewed and per EMR flowsheets.        Anesthesia Post Evaluation    Patient location during evaluation: PACU  Patient participation: complete - patient participated  Level of consciousness: awake and alert  Airway patency: patent  Nausea & Vomiting: no nausea and no vomiting  Complications: no  Cardiovascular status: hemodynamically stable  Respiratory status: acceptable  Hydration status: stable

## 2021-07-26 NOTE — PROGRESS NOTES
Pt a&o. VSS. Pt states pain better 1/10 pt denies nausea. Dressing cdi. Pt stable ready for phase 2.

## 2021-07-26 NOTE — ANESTHESIA PRE PROCEDURE
Department of Anesthesiology  Preprocedure Note       Name:  Debby Ribeiro   Age:  19 y.o.  :  2000                                          MRN:  [id number]         Date:  2021      Surgeon: Suzanne Braun):  Dick Robles MD    Procedure: Procedure(s): MICROLUMBAR DISCECTOMY    Medications prior to admission:   Prior to Admission medications    Medication Sig Start Date End Date Taking? Authorizing Provider   mupirocin (BACTROBAN) 2 % ointment Apply 1 each topically 2 times daily bilat nostrils   Yes Historical Provider, MD   famotidine (PEPCID) 20 MG tablet Take 1 tablet by mouth every morning (before breakfast) As needed for GERD 19  Yes RADHA Barba CNP   nicotine polacrilex (NICORETTE) 4 MG gum Chew 1 piece of hum every 1-2 hours for the first 6 weeks. Then follow step down guidelines in paperwork. 19  Yes RADHA Barba CNP   dicyclomine (BENTYL) 20 MG tablet Take 1 tablet by mouth 3 times daily as needed (abdominal spasms) 21   RADHA Barba CNP   lidocaine (LIDODERM) 5 % Place 1 patch onto the skin daily 12 hours on, 12 hours off.   Patient taking differently: Place 1 patch onto the skin daily as needed 12 hours on, 12 hours off. 21   CAPRI Payne   ibuprofen (ADVIL;MOTRIN) 600 MG tablet Take 1 tablet by mouth every 6 hours as needed for Pain 21  Dee Baumgarten, PA-C       Current medications:    Current Facility-Administered Medications   Medication Dose Route Frequency Provider Last Rate Last Admin    0.9 % sodium chloride infusion   Intravenous Continuous Ginny Velázquez MD        sodium chloride flush 0.9 % injection 10 mL  10 mL Intravenous 2 times per day Ginny Velázquez MD        sodium chloride flush 0.9 % injection 10 mL  10 mL Intravenous PRN Ginny Velázquez MD        0.9 % sodium chloride infusion  25 mL Intravenous PRN Ginny Velázquez MD        ceFAZolin (ANCEF) 2000 mg in dextrose 3 % 50 mL IVPB kg)     Body mass index is 26.64 kg/m². CBC:   Lab Results   Component Value Date    WBC 7.6 07/26/2021    RBC 5.12 07/26/2021    HGB 15.8 07/26/2021    HCT 45.6 07/26/2021    MCV 89.1 07/26/2021    RDW 13.0 07/26/2021     07/26/2021       CMP:   Lab Results   Component Value Date     05/05/2021    K 4.7 05/05/2021     05/05/2021    CO2 26 05/05/2021    BUN 15 05/05/2021    CREATININE 0.7 05/05/2021    GFRAA >60 05/05/2021    AGRATIO 1.7 05/05/2021    LABGLOM >60 05/05/2021    GLUCOSE 93 05/05/2021    PROT 7.6 05/05/2021    CALCIUM 9.3 05/05/2021    BILITOT 0.6 05/05/2021    ALKPHOS 76 05/05/2021    AST <5 05/05/2021    ALT <5 05/05/2021       POC Tests: No results for input(s): POCGLU, POCNA, POCK, POCCL, POCBUN, POCHEMO, POCHCT in the last 72 hours. Coags:   Lab Results   Component Value Date    PROTIME 11.4 07/26/2021    INR 1.01 07/26/2021       HCG (If Applicable): No results found for: PREGTESTUR, PREGSERUM, HCG, HCGQUANT     ABGs: No results found for: PHART, PO2ART, OZA1GRR, MYF4VRM, BEART, X9SGCMDQ     Type & Screen (If Applicable):  No results found for: LABABO, LABRH    Drug/Infectious Status (If Applicable):  No results found for: HIV, HEPCAB    COVID-19 Screening (If Applicable):   Lab Results   Component Value Date    COVID19 NOT DETECTED 08/26/2020           Anesthesia Evaluation  Patient summary reviewed no history of anesthetic complications:   Airway: Mallampati: I  TM distance: >3 FB   Neck ROM: full  Mouth opening: > = 3 FB Dental:      Comment: No loose teeth    Pulmonary: breath sounds clear to auscultation  (+) current smoker    (-) COPD, asthma, shortness of breath, recent URI and sleep apnea          Patient smoked on day of surgery.                  Cardiovascular:        (-) hypertension, valvular problems/murmurs, past MI, CAD, CABG/stent, dysrhythmias,  angina,  CHF, orthopnea and no pulmonary hypertension      Rhythm: regular  Rate: normal

## 2021-07-26 NOTE — LETTER
Billing and Coding Fee Ticket    Name:BERNA SCHWAB  : 2000  Diagnosis: HNP L5-S1  Surgeon: Robert Cardenas. Chencho Kumar    DOS: 19    Procedure:  1.   Microlumbar laminotomy partial discectomy L5-S1 [id number]

## 2021-07-26 NOTE — H&P
I have reviewed the history and physical and examined the patient and find no relevant changes. I have reviewed with the patient and/or family the risks, benefits, and alternatives to the procedure. The patient was counseled at length about the risks of aileen Covid-19 during their perioperative period and any recovery window from their procedure. The patient was made aware that aileen Covid-19  may worsen their prognosis for recovering from their procedure  and lend to a higher morbidity and/or mortality risk. All material risks, benefits, and reasonable alternatives including postponing the procedure were discussed. The patient does wish to proceed with the procedure at this time. Antonino Rock MD  7/26/2021 No intake/output data recorded.

## 2021-07-26 NOTE — PROGRESS NOTES
Pt tolerates PO well. When asked about pain, pt states \"I can feel it but it is ok right now. \" Discharge instructions reviewed with pt and mother. Both express an understanding of instructions. Up to chair. Call light within reach. Mother remains in room. Patient refused

## 2021-07-26 NOTE — PROGRESS NOTES
Pt awake but groggy on arrival to phase II. Discomfort in lower back 2-3/10 but denies radiating down legs at present. Pt moves all extremities. Given pepsi and crackers and call light. Mother to room.

## 2021-08-03 ENCOUNTER — OFFICE VISIT (OUTPATIENT)
Dept: INTERNAL MEDICINE CLINIC | Age: 21
End: 2021-08-03
Payer: COMMERCIAL

## 2021-08-03 VITALS
WEIGHT: 196.8 LBS | DIASTOLIC BLOOD PRESSURE: 70 MMHG | OXYGEN SATURATION: 97 % | HEART RATE: 103 BPM | SYSTOLIC BLOOD PRESSURE: 104 MMHG | BODY MASS INDEX: 27.45 KG/M2

## 2021-08-03 DIAGNOSIS — M54.32 LEFT SIDED SCIATICA: ICD-10-CM

## 2021-08-03 DIAGNOSIS — R00.0 TACHYCARDIA: ICD-10-CM

## 2021-08-03 DIAGNOSIS — F17.200 SMOKER: Primary | ICD-10-CM

## 2021-08-03 PROCEDURE — 99213 OFFICE O/P EST LOW 20 MIN: CPT | Performed by: NURSE PRACTITIONER

## 2021-08-03 PROCEDURE — G8427 DOCREV CUR MEDS BY ELIG CLIN: HCPCS | Performed by: NURSE PRACTITIONER

## 2021-08-03 PROCEDURE — 4004F PT TOBACCO SCREEN RCVD TLK: CPT | Performed by: NURSE PRACTITIONER

## 2021-08-03 PROCEDURE — G8419 CALC BMI OUT NRM PARAM NOF/U: HCPCS | Performed by: NURSE PRACTITIONER

## 2021-08-03 SDOH — ECONOMIC STABILITY: FOOD INSECURITY: WITHIN THE PAST 12 MONTHS, THE FOOD YOU BOUGHT JUST DIDN'T LAST AND YOU DIDN'T HAVE MONEY TO GET MORE.: NEVER TRUE

## 2021-08-03 SDOH — ECONOMIC STABILITY: FOOD INSECURITY: WITHIN THE PAST 12 MONTHS, YOU WORRIED THAT YOUR FOOD WOULD RUN OUT BEFORE YOU GOT MONEY TO BUY MORE.: NEVER TRUE

## 2021-08-03 ASSESSMENT — ENCOUNTER SYMPTOMS
WHEEZING: 0
COUGH: 0
SHORTNESS OF BREATH: 0

## 2021-08-03 ASSESSMENT — SOCIAL DETERMINANTS OF HEALTH (SDOH): HOW HARD IS IT FOR YOU TO PAY FOR THE VERY BASICS LIKE FOOD, HOUSING, MEDICAL CARE, AND HEATING?: VERY HARD

## 2021-08-03 NOTE — PROGRESS NOTES
Office Visit   8/3/2021    Subjective:  Chief Complaint   Patient presents with    Nicotine Dependence     pt says he's cut back on smoking, only about a quarter a pack of cigarellos     Post-Op Check     pt had his herniated disc surgery on 7/26, found out that it was also torn     HPI:  Thong Wu is a 19 y.o. male who presents to the clinic today for follow up. Patient reports that he had herniated disc spine surgery on 7/26. He states that they found that this area was \"torn\" and states his pain has since improved. States \"I have so much energy I don't know what to do. \"    Smoker- Smoking 0.25 ppd. Prescribed Nicorette gum- states he is \"trying\" to use this. Review of Systems   Constitutional: Negative for chills, fatigue, fever and unexpected weight change. Eyes: Negative for visual disturbance. Respiratory: Negative for cough, shortness of breath and wheezing. Cardiovascular: Negative for chest pain, palpitations and leg swelling. Skin: Negative for pallor and rash. Neurological: Negative for dizziness, weakness, light-headedness, numbness and headaches. No Known Allergies    Current Outpatient Rx   Medication Sig Dispense Refill    famotidine (PEPCID) 20 MG tablet Take 1 tablet by mouth every morning (before breakfast) As needed for GERD 90 tablet 0    dicyclomine (BENTYL) 20 MG tablet Take 1 tablet by mouth 3 times daily as needed (abdominal spasms) 90 tablet 0    nicotine polacrilex (NICORETTE) 4 MG gum Chew 1 piece of hum every 1-2 hours for the first 6 weeks. Then follow step down guidelines in paperwork. 100 each 1    docusate sodium (COLACE) 100 MG capsule Take 1 capsule by mouth 2 times daily as needed for Constipation (Patient not taking: Reported on 8/3/2021) 30 capsule 0    lidocaine (LIDODERM) 5 % Place 1 patch onto the skin daily 12 hours on, 12 hours off.  (Patient not taking: Reported on 8/3/2021) 30 patch 0       Patient Active Problem List   Diagnosis    Left sided sciatica    Anxiety    Left leg numbness        Wt Readings from Last 3 Encounters:   08/03/21 196 lb 12.8 oz (89.3 kg)   07/26/21 191 lb (86.6 kg)   07/13/21 191 lb 12.8 oz (87 kg)     BP Readings from Last 3 Encounters:   08/03/21 104/70   07/26/21 (!) 128/96   07/26/21 115/75     Objective/Physical Exam:  /70   Pulse 103   Wt 196 lb 12.8 oz (89.3 kg)   SpO2 97%   BMI 27.45 kg/m²   Body mass index is 27.45 kg/m². Physical Exam  Vitals reviewed. Constitutional:       General: He is not in acute distress. Appearance: He is well-developed. He is not diaphoretic. HENT:      Head: Normocephalic and atraumatic. Cardiovascular:      Rate and Rhythm: Regular rhythm. Tachycardia present. Pulmonary:      Effort: Pulmonary effort is normal. No respiratory distress. Breath sounds: Normal breath sounds. No wheezing or rales. Chest:      Chest wall: No tenderness. Skin:     General: Skin is warm and dry. Comments: Well healing midline scar on spine   Neurological:      Mental Status: He is alert and oriented to person, place, and time. Coordination: Coordination normal.   Psychiatric:         Mood and Affect: Mood normal.       Assessment and Plan:  Isaac Damian was seen today for nicotine dependence and post-op check. Diagnoses and all orders for this visit:    Smoker   - Cessation recommended. - States he is using the nicotine gum intermittently, but feels he needs \"more committement\" on his end to want to quit. States the cigarettes taste \"worse after my surgery. \"   - Pt requesting 6 week f/u for smoking cessation. Left sided sciatica   - Continue with ortho. - Well healing scar on the pt's midline back. Tachycardia/anxiety   - States that tachycardia is r/t his anxiety. States he is nervous because he drove himself and he was thinking his mom would drive him. States he has not driven since surgery.    - Denies chest pain, palpitations, shortness of breath, trouble breathing, lightheadedness, dizziness or blurred vision. EKG and holter reviewed. Drug screen negative. - Recommended a stress test due to tachycardia. The patient declines. - Continue with psychology. - Red flag warning signs reviewed with the pt and he will go to the ER if these occur. Return in about 6 weeks (around 9/14/2021) for smoking cessation f/u, or sooner if needed. Pt will call if symptoms worsen or fail to improve. All questions answered. Pt states no further questions or concerns at this time.    Electronically signed by: Dorrie Schlatter, APRN - [facility] 08/03/21

## 2021-08-10 ENCOUNTER — OFFICE VISIT (OUTPATIENT)
Dept: ORTHOPEDIC SURGERY | Age: 21
End: 2021-08-10

## 2021-08-10 VITALS — HEIGHT: 71 IN | WEIGHT: 196 LBS | BODY MASS INDEX: 27.44 KG/M2

## 2021-08-10 DIAGNOSIS — M51.26 HNP (HERNIATED NUCLEUS PULPOSUS), LUMBAR: Primary | ICD-10-CM

## 2021-08-10 PROCEDURE — 99024 POSTOP FOLLOW-UP VISIT: CPT | Performed by: ORTHOPAEDIC SURGERY

## 2021-08-10 NOTE — PROGRESS NOTES
Mr. Debby Ribeiro returns today 2 weeks s/p MLD left L5-S1. He reports marked improvement of his leg symptoms. Patient states that he has had mild discomfort in his back recently but he has been active and he has been doing an exercise routine. He denies any bowel or bladder dysfunction  Pain Assessment  Location of Pain: Back  Location Modifiers: Medial  Severity of Pain: 0  Quality of Pain: Aching  Duration of Pain: Other (Comment)  Frequency of Pain: Intermittent  Aggravating Factors: Other (Comment)  Limiting Behavior: Some  Relieving Factors: Rest  Result of Injury: No  Work-Related Injury: No  Are there other pain locations you wish to document?: No]    His incisions show no signs of infection. He has a normal gait and 5/5 strength of his ankle DFs/PFs, bilaterally. His sensation is intact from L3 to S1 bilaterally. I cautioned him to avoid lifting more than 10 pounds, bending and impact type aerobic exercise for 8 week after surgery, then slowly increase his activity over the next month.   Today educational material was given on a stretching program.

## 2021-08-27 ENCOUNTER — VIRTUAL VISIT (OUTPATIENT)
Dept: PSYCHOLOGY | Age: 21
End: 2021-08-27
Payer: COMMERCIAL

## 2021-08-27 DIAGNOSIS — F41.0 PANIC ATTACK: Primary | ICD-10-CM

## 2021-08-27 PROCEDURE — 90832 PSYTX W PT 30 MINUTES: CPT | Performed by: PSYCHOLOGIST

## 2021-08-27 PROCEDURE — 4004F PT TOBACCO SCREEN RCVD TLK: CPT | Performed by: PSYCHOLOGIST
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
## 2021-09-27 ENCOUNTER — TELEPHONE (OUTPATIENT)
Dept: PSYCHIATRY | Age: 21
End: 2021-09-27

## 2021-09-27 ENCOUNTER — VIRTUAL VISIT (OUTPATIENT)
Dept: PSYCHOLOGY | Age: 21
End: 2021-09-27

## 2021-09-27 DIAGNOSIS — F41.0 PANIC ATTACK: Primary | ICD-10-CM

## 2021-09-27 PROCEDURE — 99999 PR OFFICE/OUTPT VISIT,PROCEDURE ONLY: CPT | Performed by: PSYCHOLOGIST

## 2021-09-27 NOTE — PROGRESS NOTES
TELEHEALTH VISIT -- Audio/Visual (During ZEAPN-53 public health emergency)  }  Pursuant to the emergency declaration under the [address] [place] [address], [facility] waiver authority and the [employer] Act, this Virtual Visit was conducted, with patient's consent, to reduce the patient's risk of exposure to COVID-19 and provide continuity of care for an established patient. Services were provided through a video synchronous discussion virtually to substitute for in-person clinic visit. Pt gave verbal informed consent to participate in telehealth services. Conducted a risk-benefit analysis and determined that the patient's presenting problems are consistent with the use of telepsychology. Determined that the patient has sufficient knowledge and skills in the use of technology enabling them to adequately benefit from telepsychology. It was determined that this patient was able to be properly treated without an in-person session. Patient verified that they were currently located at the [facility] address that was provided during registration or another [facility] address, if noted below. Verified the following information:  Patient's identification: Yes  Patient location: [address] [place] [address]   Patient's call back number: [phone]   Patient's emergency contact's name and number, as well as permission to contact them if needed: Extended Emergency Contact Information  Primary Emergency Contact: Cele Rodas  Address: [address] Phone: [phone]  Mobile Phone: [phone]  Relation: Parent     Provider location: [place], [address] Consultation  Lita Flannery, Ph.D.  Psychologist  9/27/2021  11:12 AM      Time spent with Patient: 10 minutes  This is patient's eighth  [facility] appointment.     Reason for Consult:    Chief Complaint Patient presents with    Stress       Feedback given to PCP. S:  Pt seen for f/u of stress. Pt reported overall good mood and sxs. Reported he woke up when I called to remind of appt. Stated he goes to sleep between 2-5pm and wakes whenever. Still not cleared to do intense physical activity, but is able to go for daily walk, only walks for a few minutes at a time. Surgeon told him he can walk as much as he wants, but is afraid of over doing it despite feeling completely fine on his walks. W clarification, pt noted it's actually that he doesn't like walking as much as the acitvities he currently cannot do. Planned to take 's test today, but Mom has the car and pt doesn't know when she will be back. Pt was rather disengaged and stated he doesn't have anything he wants to talk about today and would like ot go back to bed. Scheduled for November.      O:  MSE:    Appearance    alert, cooperative  Appetite normal  Sleep disturbance Yes  Fatigue Yes  Loss of pleasure No  Impulsive behavior No  Speech    spontaneous, normal rate, normal volume and well articulated  Mood    euthymic  Affect    normal affect  Thought Content    intact  Thought Process    linear, goal directed and coherent  Associations    logical connections  Insight    Fair  Judgment    Intact  Orientation    oriented to person, place, time, and general circumstances  Memory    recent and remote memory intact  Attention/Concentration    intact  Morbid ideation No  Suicide Assessment    no suicidal ideation    History:  Social History:   Social History     Socioeconomic History    Marital status: Single     Spouse name: Not on file    Number of children: Not on file    Years of education: Not on file    Highest education level: Not on file   Occupational History    Not on file   Tobacco Use    Smoking status: Current Every Day Smoker     Packs/day: 0.25     Years: 4.00     Pack years: 1.00     Types: Cigarettes    Smokeless tobacco: Never Used   Vaping Use    Vaping Use: Former    Substances: Always   Substance and Sexual Activity    Alcohol use: Yes     Comment: once every three months    Drug use: Not Currently     Comment: pt reports he smokes lavendar.  Sexual activity: Not Currently     Partners: Female   Other Topics Concern    Not on file   Social History Narrative    Mom lives nearby. 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 Enjoys video games and computers. Reports: \"I am kind of a hermit. \"     Social Determinants of Health     Financial Resource Strain: High Risk    Difficulty of Paying Living Expenses: Very hard   Food Insecurity: No Food Insecurity    Worried About Running Out of Food in the Last Year: Never true    Garland of Food in the Last Year: Never true   Transportation Needs:     Lack of Transportation (Medical):  Lack of Transportation (Non-Medical):    Physical Activity:     Days of Exercise per Week:     Minutes of Exercise per Session:    Stress:     Feeling of Stress :    Social Connections:     Frequency of Communication with Friends and Family:     Frequency of Social Gatherings with Friends and Family:     Attends Caodaism Services:     Active Member of Clubs or Organizations:     Attends Club or Organization Meetings:     Marital Status:    Intimate Partner Violence:     Fear of Current or Ex-Partner:     Emotionally Abused:     Physically Abused:     Sexually Abused:      TOBACCO:   reports that he has been smoking cigarettes. He has a 1.00 pack-year smoking history. He has never used smokeless tobacco.  ETOH:   reports current alcohol use. Diagnosis:  1. Panic attack          Plan:  Pt interventions:  Discussed and problem-solved barriers in adhering to behavioral change plan        Documentation was done using voice recognition dragon software. Every effort was made to ensure accuracy; however, inadvertent, unintentional computerized transcription errors may be present.

## 2021-10-11 DIAGNOSIS — R10.9 ABDOMINAL SPASMS: ICD-10-CM

## 2021-10-12 RX ORDER — FAMOTIDINE 20 MG/1
TABLET, FILM COATED ORAL
Qty: 90 TABLET | Refills: 0 | Status: SHIPPED | OUTPATIENT
Start: 2021-10-12 | End: 2022-01-20 | Stop reason: SDUPTHER

## 2021-11-04 NOTE — PROGRESS NOTES
History of present illness:   Mr. Jonny Leach  is a pleasant 23 y.o. male with a PMH of anxiety returning to the office regarding his LBP and left leg pain. He states his pain has steadily persisted since last office visit. He rates his pain 6/10 today. He describes the pain as shooting, sharp, and constant that is worse with any movement and better with rest. The leg pain radiates down the lateral aspect of his left leg to his foot. He admits numbness and tingling in his left leg. He denies progressive weakness of his left leg and denies bowel or bladder dysfunction. His left hip was evaluated by Dr. Nima Ragland and found no significant arthritis or hip pathology. He has tried PT, Naprosyn, and an epidural injection with some relief. His past epidural injection provided approximately 4 months relief. Physical examination:  Mr. Kamaljit Rodas's most recent vitals:  Vitals  Height: 5' 10.98\" (180.3 cm)  Weight: 196 lb (88.9 kg)  Body mass index is 27.35 kg/m². General exam:  He is well-developed and well-nourished, is in obvious pain and alert and oriented to person, place, and time. He demonstrates appropriate mood and affect. His skin is warm and dry. His gait is normal and he walks heel to toe without limp or instability. He has mild difficulty with heel walking on the left no difficulty with toe walking. Back:  He stands and walks with moderate lumbar flexion. His lumbar flexion, extension and lateral bending are moderately reduced with pain. He has moderate tenderness over his lumbar spine without obvious muscle spasm. Tenderness over left lumbar paraspinal muscles. The skin over his lumbar spine is normal without a surgical scar. Lower extremities:  He has 5/5 motor strength of bilateral lower extremities. He has a negative straight leg raise, bilaterally. Deep tendon reflexes at knees and achilles are 2+. Sensation is intact to light touch L3 to S1 bilaterally. He has no clonus.  Hip range of motion painless. Imaging:  I reviewed MRI images of his lumbar spine from 7/14/20. They show a left paracentral disc protrusion L5-S1. Assessment:  Lumbar HNP    Plan:  We discussed treatment options including observation, additional oral steroids, physical therapy, additional epidural injections and microlumbar discectomy. He wishes to try a 2nd epidural injection. He will call for a new lumbar MRI if his symptoms persist after that.      Luis Quezada PA-C Expiration Date (Optional): 12/31/2022

## 2021-11-12 ENCOUNTER — VIRTUAL VISIT (OUTPATIENT)
Dept: PSYCHOLOGY | Age: 21
End: 2021-11-12
Payer: COMMERCIAL

## 2021-11-12 ENCOUNTER — TELEPHONE (OUTPATIENT)
Dept: PSYCHIATRY | Age: 21
End: 2021-11-12

## 2021-11-12 DIAGNOSIS — F41.0 PANIC ATTACK: Primary | ICD-10-CM

## 2021-11-12 PROCEDURE — 90832 PSYTX W PT 30 MINUTES: CPT | Performed by: PSYCHOLOGIST

## 2021-11-12 NOTE — PROGRESS NOTES
TELEHEALTH VISIT -- Audio/Visual (During SDGBL-20 public health emergency)  }  Pursuant to the emergency declaration under the [address] [place] [address], [facility] waiver authority and the [employer] Act, this Virtual Visit was conducted, with patient's consent, to reduce the patient's risk of exposure to COVID-19 and provide continuity of care for an established patient. Services were provided through a video synchronous discussion virtually to substitute for in-person clinic visit. Pt gave verbal informed consent to participate in telehealth services. Conducted a risk-benefit analysis and determined that the patient's presenting problems are consistent with the use of telepsychology. Determined that the patient has sufficient knowledge and skills in the use of technology enabling them to adequately benefit from telepsychology. It was determined that this patient was able to be properly treated without an in-person session. Patient verified that they were currently located at the [facility] address that was provided during registration or another [facility] address, if noted below. Verified the following information:  Patient's identification: Yes  Patient location: [address] [place] [address]   Patient's call back number: [phone]   Patient's emergency contact's name and number, as well as permission to contact them if needed: Extended Emergency Contact Information  Primary Emergency Contact: Cele Rodas  Address: [address] Phone: [phone]  Mobile Phone: [phone]  Relation: Parent     Provider location: [place], [address] Consultation  Jovan Kelley, Ph.D.  Psychologist  11/12/2021  10:44 AM      Time spent with Patient: 20 minutes  This is patient's ninth  [facility] appointment.     Reason for Consult:    Chief Complaint Patient presents with    Stress       Feedback given to PCP. S:  Pt seen for f/u of panic attack. Pt reported okay mood and sxs. 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 Stated his back is feeling \"great. \" Plans to take 's test on the 18th. He will be working for [employer] doing [employer] until he moves. Discussed smoking cessation \"I want to feel motivated, I just don't. \" Smoking about 3 cigarillos/day ($1.50/day). Not concerned about health impacts. Was able to quit before for about 6 wks bc he couldn't afford it. Stated cravings weren't \"super bad,\" he was irritable for 3 wks. Importance: 4/10, wants to start working out, not spend the money.      O:  MSE:    Appearance    alert, cooperative  Appetite normal  Sleep disturbance Yes  Fatigue Yes  Loss of pleasure No  Impulsive behavior No  Speech    spontaneous, normal rate, normal volume and well articulated  Mood    euthymic  Affect    normal affect  Thought Content    intact  Thought Process    linear, goal directed and coherent  Associations    logical connections  Insight    Fair  Judgment    Intact  Orientation    oriented to person, place, time, and general circumstances  Memory    recent and remote memory intact  Attention/Concentration    intact  Morbid ideation No  Suicide Assessment    no suicidal ideation    History:  Social History:   Social History     Socioeconomic History    Marital status: Single     Spouse name: Not on file    Number of children: Not on file    Years of education: Not on file    Highest education level: Not on file   Occupational History    Not on file   Tobacco Use    Smoking status: Current Every Day Smoker     Packs/day: 0.25     Years: 4.00     Pack years: 1.00     Types: Cigarettes    Smokeless tobacco: Never Used   Vaping Use    Vaping Use: Former    Substances: Always   Substance and Sexual Activity    Alcohol use: Yes     Comment: once every three months    Drug use: Not Currently     Comment: pt reports he smokes lavendar.  Sexual activity: Not Currently     Partners: Female   Other Topics Concern    Not on file   Social History Narrative    Mom lives nearby. 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 Enjoys video games and computers. Reports: \"I am kind of a hermit. \"     Social Determinants of Health     Financial Resource Strain: High Risk    Difficulty of Paying Living Expenses: Very hard   Food Insecurity: No Food Insecurity    Worried About Running Out of Food in the Last Year: Never true    Garland of Food in the Last Year: Never true   Transportation Needs:     Lack of Transportation (Medical): Not on file    Lack of Transportation (Non-Medical): Not on file   Physical Activity:     Days of Exercise per Week: Not on file    Minutes of Exercise per Session: Not on file   Stress:     Feeling of Stress : Not on file   Social Connections:     Frequency of Communication with Friends and Family: Not on file    Frequency of Social Gatherings with Friends and Family: Not on file    Attends Christian Services: Not on file    Active Member of [address] [place] [address] or Organizations: Not on file    Attends Club or Organization Meetings: Not on file    Marital Status: Not on file   Intimate Partner Violence:     Fear of Current or Ex-Partner: Not on file    Emotionally Abused: Not on file    Physically Abused: Not on file    Sexually Abused: Not on file   Housing Stability:     Unable to Pay for Housing in the Last Year: Not on file    Number of [place] in the Last Year: Not on file    Unstable Housing in the Last Year: Not on file     TOBACCO:   reports that he has been smoking cigarettes. He has a 1.00 pack-year smoking history. He has never used smokeless tobacco.  ETOH:   reports current alcohol use. Diagnosis:  1.  Panic attack          Plan:  Pt interventions:  Discussed and problem-solved barriers in adhering to behavioral change plan, Motivational Interviewing to increase patient confidence and compliance with adhering to behavioral change plan and Motivational Interviewing to determine importance and readiness for change        Documentation was done using voice recognition dragon software. Every effort was made to ensure accuracy; however, inadvertent, unintentional computerized transcription errors may be present.

## 2021-12-22 ENCOUNTER — TELEPHONE (OUTPATIENT)
Dept: INTERNAL MEDICINE CLINIC | Age: 21
End: 2021-12-22

## 2021-12-22 NOTE — TELEPHONE ENCOUNTER
Pt advised on vm that he is past due for a f/u appt. If he schedules I can see if Luis Rudolph will send refills over.

## 2021-12-22 NOTE — TELEPHONE ENCOUNTER
Patient calling requesting refill of dicyclomine (BENTYL) 20 MG tablet & famotidine (PEPCID) 20 MG tablet    Last written 06/22/21 (dicyclomine)    10/12/21 (famotidine)  Last OV 06/22/21   Pre-op 07/13/21  Next OV nothing scheduled   No show 09/14/21  Last recommended OV 08/03/21     Please send to [address] on [place].

## 2022-01-07 ENCOUNTER — TELEPHONE (OUTPATIENT)
Dept: INTERNAL MEDICINE CLINIC | Age: 22
End: 2022-01-07

## 2022-01-07 NOTE — TELEPHONE ENCOUNTER
Patient believes it was a notification or reminder of his appt w/Dr. Stephan Sahni on Monday. Scheduled patient for f/u with PCP on 1/20/22.

## 2022-01-07 NOTE — TELEPHONE ENCOUNTER
----- Message from Jigar Granda sent at 1/7/2022  1:50 PM EST -----  Subject: Message to Provider    QUESTIONS  Information for Provider? Patient Indy Deleon called @ 1:48 PM 1/7/22 to   return a call back from the Practice. ---------------------------------------------------------------------------  --------------  Roland BLAKELY  What is the best way for the office to contact you? OK to leave message on   voicemail, OK to respond with electronic message via [employer] portal (only   for patients who have registered [employer] account)  Preferred Call Back Phone Number?  [phone]  ---------------------------------------------------------------------------  --------------  SCRIPT ANSWERS  undefined

## 2022-01-10 ENCOUNTER — VIRTUAL VISIT (OUTPATIENT)
Dept: PSYCHOLOGY | Age: 22
End: 2022-01-10
Payer: COMMERCIAL

## 2022-01-10 DIAGNOSIS — F43.21 ADJUSTMENT DISORDER WITH DEPRESSED MOOD: Primary | ICD-10-CM

## 2022-01-10 PROCEDURE — 90832 PSYTX W PT 30 MINUTES: CPT | Performed by: PSYCHOLOGIST

## 2022-01-10 NOTE — PROGRESS NOTES
TELEHEALTH VISIT -- Audio/Visual (During TNWAE-47 public health emergency)  }  Pursuant to the emergency declaration under the [address] [place] [address] [facility] authority and the [employer] Act, this Virtual Visit was conducted, with patient's consent, to reduce the patient's risk of exposure to COVID-19 and provide continuity of care for an established patient. Services were provided through a video synchronous discussion virtually to substitute for in-person clinic visit. Pt gave verbal informed consent to participate in telehealth services. Conducted a risk-benefit analysis and determined that the patient's presenting problems are consistent with the use of telepsychology. Determined that the patient has sufficient knowledge and skills in the use of technology enabling them to adequately benefit from telepsychology. It was determined that this patient was able to be properly treated without an in-person session. Patient verified that they were currently located at the [address] [place] [address] Dr address that was provided during registration or another [address] [place] [address] address, if noted below. Verified the following information:  Patient's identification: Yes  Patient location: [address] [place] [address]   Patient's call back number: [phone]   Patient's emergency contact's name and number, as well as permission to contact them if needed: Extended Emergency Contact Information  Primary Emergency Contact: Cele Rodas  Address: [address] Phone: [phone]  Mobile Phone: [phone]  Relation: Parent     Provider location: Pedrito Gr, Ph.D.  Psychologist  1/10/2022  3:35 PM      Time spent with Patient: 25 minutes  This is patient's tenth  [facility] appointment.     Reason for Consult:    Chief Complaint Patient presents with    Depression       Feedback given to PCP. S:  Pt seen for f/u of depression. Pt reported worsened mood and sxs. Has been deactivated (struggling to keep up w household), doesn't want to socialize (even to play video games), sxs started worsening about 1.5 mos ago. Stated he has been having intrusive thoughts of self-harm and suicide, adamantly denied intent or plan. Stated he has a knife and feels safe w it, reiterating that he would never actually harm himself. [id number] [facility] is being renovated, which means increase in rent and no pets; will be moving in w a friend, which he stated he doesn't want to do. The friend is the person he previously reported annoys him when they play video games, has partial custody of 1.4yo son Carolyn Stake might be loud. \" They are planning on moving into a house w a basement so pt can have entire basement, but they have not yet secured such housing. Working 20 hrs/wk at [employer] d/t pain \"my body hurts all the time,\" he associates w being sedentary when not at work. Only been back for 1 month. Provided psychoeducation on rationale for behavioral activation. Attempted to make a social plan w a friend, but they didn't message him back. - clean room (put all trash in trash can, wipe off desk, vacuum, dirty clothes in wash)  - start on dishes (work on dishes for 15 minutes, 3x/wk)  - take out trash    Inquired about medication, encouraged him to f/u w PCP at his appt in 10 days.      O:  MSE:  Appearance    alert, cooperative  Appetite normal  Sleep disturbance Yes  Fatigue Yes  Loss of pleasure No  Impulsive behavior No  Speech    spontaneous, normal rate, normal volume and well articulated  Mood    euthymic  Affect    normal affect  Thought Content    intact  Thought Process    linear, goal directed and coherent  Associations    logical connections  Insight    Fair  Judgment    Intact  Orientation    oriented to person, place, time, and general circumstances  Memory    recent and remote memory intact  Attention/Concentration    intact  Morbid ideation No  Suicide Assessment    no suicidal ideation    History:  Social History:   Social History     Socioeconomic History    Marital status: Single     Spouse name: Not on file    Number of children: Not on file    Years of education: Not on file    Highest education level: Not on file   Occupational History    Not on file   Tobacco Use    Smoking status: Current Every Day Smoker     Packs/day: 0.25     Years: 4.00     Pack years: 1.00     Types: Cigarettes    Smokeless tobacco: Never Used   Vaping Use    Vaping Use: Former    Substances: Always   Substance and Sexual Activity    Alcohol use: Yes     Comment: once every three months    Drug use: Not Currently     Comment: pt reports he smokes lavendar.  Sexual activity: Not Currently     Partners: Female   Other Topics Concern    Not on file   Social History Narrative    Mom lives nearby. 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 Enjoys video games and computers. Reports: \"I am kind of a hermit. \"     Social Determinants of Health     Financial Resource Strain: High Risk    Difficulty of Paying Living Expenses: Very hard   Food Insecurity: No Food Insecurity    Worried About Running Out of Food in the Last Year: Never true    [place] of Food in the Last Year: Never true   Transportation Needs:     Lack of Transportation (Medical): Not on file    Lack of Transportation (Non-Medical):  Not on file   Physical Activity:     Days of Exercise per Week: Not on file    Minutes of Exercise per Session: Not on file   Stress:     Feeling of Stress : Not on file   Social Connections:     Frequency of Communication with Friends and Family: Not on file    Frequency of Social Gatherings with Friends and Family: Not on file    Attends Holiness Services: Not on file    Active Member of Clubs or Organizations: Not on file    Attends Club or Organization Meetings: Not on file    Marital Status: Not on file   Intimate Partner Violence:     Fear of Current or Ex-Partner: Not on file    Emotionally Abused: Not on file    Physically Abused: Not on file    Sexually Abused: Not on file   Housing Stability:     Unable to Pay for Housing in the Last Year: Not on file    Number of [place] in the Last Year: Not on file    Unstable Housing in the Last Year: Not on file     TOBACCO:   reports that he has been smoking cigarettes. He has a 1.00 pack-year smoking history. He has never used smokeless tobacco.  ETOH:   reports current alcohol use. Diagnosis:  Adjustment Disorder w depressed mood    Plan:  Pt interventions:  Trained in strategies for increasing balanced thinking and Discussed and set plan for behavioral activation        Documentation was done using voice recognition dragon software. Every effort was made to ensure accuracy; however, inadvertent, unintentional computerized transcription errors may be present.

## 2022-01-20 ENCOUNTER — VIRTUAL VISIT (OUTPATIENT)
Dept: INTERNAL MEDICINE CLINIC | Age: 22
End: 2022-01-20
Payer: COMMERCIAL

## 2022-01-20 DIAGNOSIS — R10.9 ABDOMINAL SPASMS: ICD-10-CM

## 2022-01-20 DIAGNOSIS — F17.200 SMOKER: ICD-10-CM

## 2022-01-20 DIAGNOSIS — Z78.9 ELECTRONIC CIGARETTE USE: ICD-10-CM

## 2022-01-20 DIAGNOSIS — F41.9 ANXIETY: ICD-10-CM

## 2022-01-20 DIAGNOSIS — M54.32 LEFT SIDED SCIATICA: Primary | ICD-10-CM

## 2022-01-20 DIAGNOSIS — F33.2 SEVERE EPISODE OF RECURRENT MAJOR DEPRESSIVE DISORDER, WITHOUT PSYCHOTIC FEATURES (HCC): ICD-10-CM

## 2022-01-20 PROCEDURE — G8419 CALC BMI OUT NRM PARAM NOF/U: HCPCS | Performed by: NURSE PRACTITIONER

## 2022-01-20 PROCEDURE — G8427 DOCREV CUR MEDS BY ELIG CLIN: HCPCS | Performed by: NURSE PRACTITIONER

## 2022-01-20 PROCEDURE — G8484 FLU IMMUNIZE NO ADMIN: HCPCS | Performed by: NURSE PRACTITIONER

## 2022-01-20 PROCEDURE — 99214 OFFICE O/P EST MOD 30 MIN: CPT | Performed by: NURSE PRACTITIONER

## 2022-01-20 PROCEDURE — 4004F PT TOBACCO SCREEN RCVD TLK: CPT | Performed by: NURSE PRACTITIONER

## 2022-01-20 RX ORDER — FAMOTIDINE 20 MG/1
TABLET, FILM COATED ORAL
Qty: 90 TABLET | Refills: 0 | Status: SHIPPED | OUTPATIENT
Start: 2022-01-20 | End: 2022-03-16 | Stop reason: SDUPTHER

## 2022-01-20 RX ORDER — SERTRALINE HYDROCHLORIDE 25 MG/1
25 TABLET, FILM COATED ORAL DAILY
Qty: 30 TABLET | Refills: 0 | Status: SHIPPED | OUTPATIENT
Start: 2022-01-20 | End: 2022-03-16 | Stop reason: SDUPTHER

## 2022-01-20 RX ORDER — DICYCLOMINE HCL 20 MG
20 TABLET ORAL 3 TIMES DAILY PRN
Qty: 90 TABLET | Refills: 0 | Status: SHIPPED | OUTPATIENT
Start: 2022-01-20 | End: 2022-03-16 | Stop reason: SDUPTHER

## 2022-01-20 ASSESSMENT — PATIENT HEALTH QUESTIONNAIRE - PHQ9
2. FEELING DOWN, DEPRESSED OR HOPELESS: 2
3. TROUBLE FALLING OR STAYING ASLEEP: 3
SUM OF ALL RESPONSES TO PHQ QUESTIONS 1-9: 19
6. FEELING BAD ABOUT YOURSELF - OR THAT YOU ARE A FAILURE OR HAVE LET YOURSELF OR YOUR FAMILY DOWN: 2
1. LITTLE INTEREST OR PLEASURE IN DOING THINGS: 2
SUM OF ALL RESPONSES TO PHQ QUESTIONS 1-9: 18
7. TROUBLE CONCENTRATING ON THINGS, SUCH AS READING THE NEWSPAPER OR WATCHING TELEVISION: 3
SUM OF ALL RESPONSES TO PHQ QUESTIONS 1-9: 19
9. THOUGHTS THAT YOU WOULD BE BETTER OFF DEAD, OR OF HURTING YOURSELF: 1
SUM OF ALL RESPONSES TO PHQ QUESTIONS 1-9: 19
10. IF YOU CHECKED OFF ANY PROBLEMS, HOW DIFFICULT HAVE THESE PROBLEMS MADE IT FOR YOU TO DO YOUR WORK, TAKE CARE OF THINGS AT HOME, OR GET ALONG WITH OTHER PEOPLE: 2
5. POOR APPETITE OR OVEREATING: 3
8. MOVING OR SPEAKING SO SLOWLY THAT OTHER PEOPLE COULD HAVE NOTICED. OR THE OPPOSITE, BEING SO FIGETY OR RESTLESS THAT YOU HAVE BEEN MOVING AROUND A LOT MORE THAN USUAL: 0
4. FEELING TIRED OR HAVING LITTLE ENERGY: 3
SUM OF ALL RESPONSES TO PHQ9 QUESTIONS 1 & 2: 4

## 2022-01-20 ASSESSMENT — ENCOUNTER SYMPTOMS
VOMITING: 0
DIARRHEA: 0
NAUSEA: 0
SHORTNESS OF BREATH: 0
COUGH: 0
WHEEZING: 0
CONSTIPATION: 0
ABDOMINAL PAIN: 0

## 2022-01-20 ASSESSMENT — COLUMBIA-SUICIDE SEVERITY RATING SCALE - C-SSRS
2. HAVE YOU ACTUALLY HAD ANY THOUGHTS OF KILLING YOURSELF?: NO
1. WITHIN THE PAST MONTH, HAVE YOU WISHED YOU WERE DEAD OR WISHED YOU COULD GO TO SLEEP AND NOT WAKE UP?: NO
6. HAVE YOU EVER DONE ANYTHING, STARTED TO DO ANYTHING, OR PREPARED TO DO ANYTHING TO END YOUR LIFE?: NO

## 2022-01-20 NOTE — PROGRESS NOTES
2022  TELEHEALTH EVALUATION -- Audio/Visual (During XF- public health emergency)    Subjective:  Chief Complaint   Patient presents with    Nicotine Dependence     has cut back, vaping now, also working now    Depression     really depressed lately, intrusive thoughts, hard to manage     HPI:   Renny Reid (:  2000) has requested an audio/video evaluation for the following concern(s):    Patient reports that he had herniated disc spine surgery on . States his back is \"great. \"     Abdominal spasms- Pepcid daily was prescribed with as needed Bentyl (uses 4 days a week PRN). States this is working very well and he would like to continue on this regimen. Denies D/C/N/V. No blood in stool. Last BM was last night.     Seeing psychology. States \"I have had a lot of problems with my depression and it is affecting my life a lot. I thought I had it under control on my own, but I don't anymore. \" Denies anxiety or anxiety attacks. Took hydroxyzine as needed in the past.  Used to take daily medications when he was 10years old, but does not remember which ones. States he thinks he had side effects on paxil.     Smoker- Smoking cigars less. Using the vape. Not interested in cutting down at this time. Review of Systems   Constitutional: Negative for chills, fatigue, fever and unexpected weight change. Eyes: Negative for visual disturbance. Respiratory: Negative for cough, shortness of breath and wheezing. Cardiovascular: Negative for chest pain, palpitations and leg swelling. Gastrointestinal: Negative for abdominal pain, constipation, diarrhea, nausea and vomiting. Skin: Negative for pallor and rash. Neurological: Negative for dizziness, weakness, light-headedness, numbness and headaches. Psychiatric/Behavioral: Positive for dysphoric mood. Negative for self-injury, sleep disturbance and suicidal ideas. The patient is nervous/anxious.       No Known Allergies    Current Outpatient Rx Medication Sig Dispense Refill    sertraline (ZOLOFT) 25 MG tablet Take 1 tablet by mouth daily 30 tablet 0    famotidine (PEPCID) 20 MG tablet TAKE 1 TABLET BY MOUTH EVERY MORNING BEFORE BREAKFAST AS NEEDED FOR GERD 90 tablet 0    dicyclomine (BENTYL) 20 MG tablet Take 1 tablet by mouth 3 times daily as needed (abdominal spasms) 90 tablet 0       Patient Active Problem List   Diagnosis    Left sided sciatica    Anxiety    Left leg numbness        Wt Readings from Last 3 Encounters:   08/10/21 196 lb (88.9 kg)   08/03/21 196 lb 12.8 oz (89.3 kg)   07/26/21 191 lb (86.6 kg)     BP Readings from Last 3 Encounters:   08/03/21 104/70   07/26/21 (!) 128/96   07/26/21 115/75     The ASCVD Risk score (Dylan Milton., et al., 2013) failed to calculate for the following reasons: The 2013 ASCVD risk score is only valid for ages 36 to 78    PHQ-9 Total Score: 19 (1/20/2022  1:21 PM)  Thoughts that you would be better off dead, or of hurting yourself in some way: 1 (1/20/2022  1:21 PM)    Objective/Physical Exam:  Virtual Video Exam  Patient-Reported Vitals 1/20/2022   Patient-Reported Pulse 96    ^no access to other VS d/t VV per pt. Physical Exam  Constitutional:       General: He is not in acute distress. Appearance: Normal appearance. He is not ill-appearing. Pulmonary:      Effort: Pulmonary effort is normal. No respiratory distress. Skin:     Coloration: Skin is not jaundiced or pale. Neurological:      Mental Status: He is alert. Comments: No facial asymmetry noted   Psychiatric:         Mood and Affect: Mood normal.     Due to this being a TeleHealth encounter, evaluation of the following organ systems is limited: Vitals/Constitutional/EENT/Resp/CV/GI//MS/Neuro/Skin/Heme-Lymph-Imm. Assessment and Plan:  Katie Dewey was seen today for nicotine dependence and depression. Diagnoses and all orders for this visit:    Left sided sciatica   - Improved.     Abdominal spasms  -    Well controlled on the current regimen without side effects. Would like a refill.   - famotidine (PEPCID) 20 MG tablet; TAKE 1 TABLET BY MOUTH EVERY MORNING BEFORE BREAKFAST AS NEEDED FOR GERD  -     dicyclomine (BENTYL) 20 MG tablet; Take 1 tablet by mouth 3 times daily as needed (abdominal spasms)    Anxiety/Severe episode of recurrent major depressive disorder, without psychotic features ([facility] [address]  -     Anxiety well controlled. However, more depression. PHQ9 is 19.  - Reports passive thoughts that he would be better off dead. Denies SI/HI. Denies plan or intent. - Pt agreeable to a daily medication  - Reviewed daily medication options. Pt agreeable to zoloft at a low dose. - sertraline (ZOLOFT) 25 MG tablet; Take 1 tablet by mouth daily - patient education handout provided and reviewed with the pt. - Continue with psychology  - Pt will call if symptoms worsen or fail to improve  - Red flag warning signs reviewed with the pt and he will go to the ER if these occur. Smoker/Electronic cigarette use   - Cessation recommended. Return in about 4 weeks (around 2/17/2022) for mood f/u, or sooner if needed. Pt will call if symptoms worsen or fail to improve. Phi Rodas, was evaluated through a synchronous (real-time) audio-video encounter. The patient (or guardian if applicable) is aware that this is a billable service, which includes applicable co-pays. This Virtual Visit was conducted with patient's (and/or legal guardian's) consent. The visit was conducted pursuant to the emergency declaration under the [address] [place] [address], [address] [place] [address] authority and the [employer] Act. Patient identification was verified, and a caregiver was present when appropriate. The patient was located in a state where the provider was licensed to provide care.     Consent:  He and/or health care decision maker is aware that that he may receive a bill for this virtual service, depending on his insurance coverage, and has provided verbal consent to proceed: Yes    Patient identification was verified at the start of the visit: Yes    Total time spent on this encounter: Not billed by time    Services were provided through a video synchronous discussion virtually to substitute for in-person clinic visit. --RADHA Carbajal - CNP on 1/20/2022 at 2:30 PM    An electronic signature was used to authenticate this note.

## 2022-01-20 NOTE — PATIENT INSTRUCTIONS
Patient Education        sertraline  Pronunciation:  SER tra alea  Brand:  Zoloft  What is the most important information I should know about sertraline? Some young people have thoughts about suicide when first taking an antidepressant. Stay alert to changes in your mood or symptoms. Report any new or worsening symptoms to your doctor. What is sertraline? Sertraline is a selective serotonin reuptake inhibitor (SSRI). Sertraline is used to treat major depressive disorder, obsessive-compulsive disorder (OCD), panic disorder, social anxiety disorder (SAD), and post-traumatic stress disorder (PTSD). Sertraline is also used to treat premenstrual dysphoric disorder. Sertraline may also be used for purposes not listed in this medication guide. What should I discuss with my healthcare provider before taking sertraline? You should not use sertraline if you are allergic to it, or if you also take pimozide. Do not use the liquid form of sertraline  if you take disulfiram (Antabuse). Do not use sertraline within 14 days before or 14 days after using an MAO inhibitor. A dangerous drug interaction could occur. MAO inhibitors include isocarboxazid, linezolid, methylene blue injection, phenelzine, tranylcypromine, and others. Tell your doctor if you also take stimulant medicine, opioid medicine, herbal products, or medicine for depression, mental illness, Parkinson's disease, migraine headaches, serious infections, or prevention of nausea and vomiting. An interaction with sertraline could cause a serious condition called serotonin syndrome. Tell your doctor if you have ever had:  · bipolar disorder (manic depression);  · heart disease, high blood pressure, or a stroke;  · liver or kidney disease;  · seizures;  · glaucoma;  · bleeding problems, or if you take warfarin (Coumadin, Jantoven);  · long QT syndrome; or  · low levels of sodium in your blood.   Some young people have thoughts about suicide when first taking an antidepressant. Your doctor should check your progress at regular visits. Your family or other caregivers should also be alert to changes in your mood or symptoms. Sertraline is approved for use in children at least 10years old, only to treat obsessive-compulsive disorder but not depression. Taking this medicine during pregnancy could harm the baby, but stopping the medicine may not be safe for you. Do not start or stop sertraline without asking your doctor. Ask a doctor if it is safe to breastfeed while using this medicine. How should I take sertraline? Follow all directions on your prescription label and read all medication guides or instruction sheets. Your doctor may occasionally change your dose. Use the medicine exactly as directed. Take sertraline with or without food, at the same time each day. Sertraline liquid (oral concentrate) must be diluted with a liquid right before you take it. Read and carefully follow all mixing instructions provided with your medicine. Ask your doctor or pharmacist if you need help. Measure the mixed medicine with the supplied syringe or a dose-measuring device (not a kitchen spoon). Sertraline may cause false results on a drug-screening urine test. Tell the laboratory staff that you use sertraline. Do not stop using sertraline suddenly, or you could have unpleasant symptoms (such as agitation, confusion, tingling or electric shock feelings). Ask your doctor before stopping the medicine. Store tightly closed at room temperature, away from moisture and heat. What happens if I miss a dose? Take the medicine as soon as you can, but skip the missed dose if it is almost time for your next dose. Do not take two doses at one time. What happens if I overdose? Seek emergency medical attention or call the Poison Help line at 1-749.654.8790. What should I avoid while taking sertraline? Drinking alcohol with this medicine can cause side effects.   Avoid driving or hazardous activity until you know how this medicine will affect you. Your reactions could be impaired. What are the possible side effects of sertraline? Get emergency medical help if you have signs of an allergic reaction: skin rash or hives (with or without fever or joint pain); difficulty breathing; swelling of your face, lips, tongue, or throat. Report any new or worsening symptoms to your doctor, such as: mood or behavior changes, anxiety, panic attacks, trouble sleeping, or if you feel impulsive, irritable, agitated, hostile, aggressive, restless, hyperactive (mentally or physically), more depressed, or have thoughts about suicide or hurting yourself. Call your doctor at once if you have:  · a seizure;  · vision changes, eye pain, redness, or swelling;  · low blood sodium --headache, confusion, problems with thinking or memory, weakness, feeling unsteady; or  · manic episodes --racing thoughts, increased energy, unusual risk-taking behavior, extreme happiness, being irritable or talkative. Seek medical attention right away if you have symptoms of serotonin syndrome, such as: agitation, hallucinations, fever, sweating, shivering, fast heart rate, muscle stiffness, twitching, loss of coordination, nausea, vomiting, or diarrhea. Sertraline can affect growth in children. Your child's height and weight may be checked often. Common side effects may include:  · indigestion, nausea, diarrhea, loss of appetite;  · sweating;  · tremors; or  · sexual problems. This is not a complete list of side effects and others may occur. Call your doctor for medical advice about side effects. You may report side effects to FDA at 7-011-FDA-7383. What other drugs will affect sertraline? Sertraline can cause a serious heart problem. Your risk may be higher if you also use certain other medicines for infections, asthma, heart problems, high blood pressure, depression, mental illness, cancer, malaria, or HIV.   Ask your doctor before taking a nonsteroidal anti-inflammatory drug (NSAID) such as aspirin, ibuprofen, naproxen, Advil, Aleve, Motrin, and others. Using an NSAID with sertraline may cause you to bruise or bleed easily. Other drugs may affect sertraline, including prescription and over-the-counter medicines, vitamins, and herbal products. Tell your doctor about all other medicines you use. Where can I get more information? Your pharmacist can provide more information about sertraline. Remember, keep this and all other medicines out of the reach of children, never share your medicines with others, and use this medication only for the indication prescribed. Every effort has been made to ensure that the information provided by [address] [place] [address]  is accurate, up-to-date, and complete, but no guarantee is made to that effect. Drug information contained herein may be time sensitive. [facility] information has been compiled for use by healthcare practitioners and consumers in the [place] and therefore [facility] does not warrant that uses outside of the [place] are appropriate, unless specifically indicated otherwise. [facility] drug information does not endorse drugs, diagnose patients or recommend therapy. [facility] drug information is an informational resource designed to assist licensed healthcare practitioners in caring for their patients and/or to serve consumers viewing this service as a supplement to, and not a substitute for, the expertise, skill, knowledge and judgment of healthcare practitioners. The absence of a warning for a given drug or drug combination in no way should be construed to indicate that the drug or drug combination is safe, effective or appropriate for any given patient. [facility] does not assume any responsibility for any aspect of healthcare administered with the aid of information [facility] provides.  The information contained herein is not intended to cover all possible uses, directions, precautions, warnings, drug interactions, allergic reactions, or adverse effects. If you have questions about the drugs you are taking, check with your doctor, nurse or pharmacist.  Copyright 7329-2545 [address]: 22.01. Revision date: 5/26/2021. Care instructions adapted under license by [facility] ([facility]). If you have questions about a medical condition or this instruction, always ask your healthcare professional. [place] [address] any warranty or liability for your use of this information.

## 2022-02-03 ENCOUNTER — TELEPHONE (OUTPATIENT)
Dept: PSYCHOLOGY | Age: 22
End: 2022-02-03

## 2022-02-08 ENCOUNTER — TELEPHONE (OUTPATIENT)
Dept: INTERNAL MEDICINE CLINIC | Age: 22
End: 2022-02-08

## 2022-02-08 NOTE — TELEPHONE ENCOUNTER
Patient rescheduled appointment from 2/3/22 to 2/24/22. He states he is doing well and that he went out this past weekend for the 1st time in a long while.

## 2022-02-24 ENCOUNTER — TELEMEDICINE (OUTPATIENT)
Dept: PSYCHOLOGY | Age: 22
End: 2022-02-24

## 2022-02-24 DIAGNOSIS — F41.0 PANIC ATTACK: Primary | ICD-10-CM

## 2022-02-24 PROCEDURE — 99999 PR OFFICE/OUTPT VISIT,PROCEDURE ONLY: CPT | Performed by: PSYCHOLOGIST

## 2022-02-24 NOTE — PROGRESS NOTES
TELEHEALTH VISIT -- Audio/Visual (During UWZDP-64 public health emergency)  }  Alia Chavez was evaluated through a synchronous (real-time) audio-video encounter (via [employer] me). The patient (or guardian, if applicable) is aware that this is a billable service, which includes applicable co-pays. This Virtual Visit was conducted with patient's (and/or legal guardian's) consent. The visit was conducted pursuant to the emergency declaration under the [address] [place] [address] authority and the [employer] and [employer] Act. Patient identification was verified, and a caregiver was present when appropriate. The patient was located in a state where the provider was licensed to provide care. Conducted a risk-benefit analysis and determined that the patient's presenting problems are consistent with the use of telepsychology. Determined that the patient has sufficient knowledge and skills in the use of technology enabling them to adequately benefit from telepsychology. It was determined that this patient was able to be properly treated without an in-person session. Patient verified that they were currently located at the [place], or [place] address that was provided during registration or another address, if noted below.     Verified the following information:  Patient's identification: Yes  Patient location: [address] [place] [address]   Patient's call back number: [phone]   Patient's emergency contact's name and number, as well as permission to contact them if needed: Extended Emergency Contact Information  Primary Emergency Contact: Cele Rodas  Address: [address] Phone: [phone]  Mobile Phone: [phone]  Relation: Parent     Provider location: [address] Consultation  Shira Albert Ph.D.  Vicki Iraheta  2/24/2022  3:40 PM      Time spent with Patient: 13 minutes  This is patient's 11th  [facility] appointment. Reason for Consult:    Chief Complaint   Patient presents with    Depression       Feedback given to PCP. S:  Pt seen for f/u of panic attack. Pt reported improving mood and sxs. Stated he has been doing well overall, working at [employer] and enjoying this, socializing. Been able to do physical activity w/o pain. Sleeping fine w melatonin, 5-10mg. Agreed to f/u prn.  Discussed relapse prevention.     - frequently having high anxiety symptoms: tightness in your chest, hard to breathe, sudden burst of anxious energy  - getting really isolated  - the physical weight of sadness    O:  MSE:  Appearance    alert, cooperative  Appetite normal  Sleep disturbance Yes  Fatigue Yes  Loss of pleasure No  Impulsive behavior No  Speech    spontaneous, normal rate, normal volume and well articulated  Mood    euthymic  Affect    normal affect  Thought Content    intact  Thought Process    linear, goal directed and coherent  Associations    logical connections  Insight    Fair  Judgment    Intact  Orientation    oriented to person, place, time, and general circumstances  Memory    recent and remote memory intact  Attention/Concentration    intact  Morbid ideation No  Suicide Assessment    no suicidal ideation    History:  Social History:   Social History     Socioeconomic History    Marital status: Single     Spouse name: Not on file    Number of children: Not on file    Years of education: Not on file    Highest education level: Not on file   Occupational History    Not on file   Tobacco Use    Smoking status: Current Every Day Smoker     Packs/day: 0.25     Years: 4.00     Pack years: 1.00     Types: Cigarettes    Smokeless tobacco: Never Used   Vaping Use    Vaping Use: Former    Substances: Always   Substance and Sexual Activity    Alcohol use: Yes     Comment: once every three months  Drug use: Not Currently     Comment: pt reports he smokes lavendar.  Sexual activity: Not Currently     Partners: Female   Other Topics Concern    Not on file   Social History Narrative    Mom lives nearby. 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 Enjoys video games and computers. Reports: \"I am kind of a hermit. \"     Social Determinants of Health     Financial Resource Strain: High Risk    Difficulty of Paying Living Expenses: Very hard   Food Insecurity: No Food Insecurity    Worried About Running Out of Food in the Last Year: Never true    [place] of Food in the Last Year: Never true   Transportation Needs:     Lack of Transportation (Medical): Not on file    Lack of Transportation (Non-Medical): Not on file   Physical Activity:     Days of Exercise per Week: Not on file    Minutes of Exercise per Session: Not on file   Stress:     Feeling of Stress : Not on file   Social Connections:     Frequency of Communication with Friends and Family: Not on file    Frequency of Social Gatherings with Friends and Family: Not on file    Attends Sabianism Services: Not on file    Active Member of [address] [place] [address] or Organizations: Not on file    Attends Club or Organization Meetings: Not on file    Marital Status: Not on file   Intimate Partner Violence:     Fear of Current or Ex-Partner: Not on file    Emotionally Abused: Not on file    Physically Abused: Not on file    Sexually Abused: Not on file   Housing Stability:     Unable to Pay for Housing in the Last Year: Not on file    Number of [place] in the Last Year: Not on file    Unstable Housing in the Last Year: Not on file     TOBACCO:   reports that he has been smoking cigarettes. He has a 1.00 pack-year smoking history. He has never used smokeless tobacco.  ETOH:   reports current alcohol use. Diagnosis:  1.  Panic attack          Plan:  Pt interventions:  Collaboratively set goals with pt re: relapse prevention        Documentation was done using voice recognition dragon software. Every effort was made to ensure accuracy; however, inadvertent, unintentional computerized transcription errors may be present.

## 2022-02-25 RX ORDER — SERTRALINE HYDROCHLORIDE 25 MG/1
25 TABLET, FILM COATED ORAL DAILY
Qty: 30 TABLET | Refills: 0 | OUTPATIENT
Start: 2022-02-25

## 2022-03-07 ENCOUNTER — NURSE TRIAGE (OUTPATIENT)
Dept: OTHER | Facility: CLINIC | Age: 22
End: 2022-03-07

## 2022-03-07 ENCOUNTER — TELEMEDICINE (OUTPATIENT)
Dept: INTERNAL MEDICINE CLINIC | Age: 22
End: 2022-03-07
Payer: COMMERCIAL

## 2022-03-07 DIAGNOSIS — J06.9 ACUTE URI: Primary | ICD-10-CM

## 2022-03-07 PROCEDURE — G8427 DOCREV CUR MEDS BY ELIG CLIN: HCPCS | Performed by: NURSE PRACTITIONER

## 2022-03-07 PROCEDURE — 87804 INFLUENZA ASSAY W/OPTIC: CPT | Performed by: NURSE PRACTITIONER

## 2022-03-07 PROCEDURE — 99213 OFFICE O/P EST LOW 20 MIN: CPT | Performed by: NURSE PRACTITIONER

## 2022-03-07 PROCEDURE — 87880 STREP A ASSAY W/OPTIC: CPT | Performed by: NURSE PRACTITIONER

## 2022-03-07 NOTE — TELEPHONE ENCOUNTER
Received call from Nia Henriquez at [facility] with Red Flag Complaint. Subjective: Caller states \"sore throat\"     Current Symptoms: hurts to swallow, hurts to eat, home COVID test neg last night, nasal congestion, headache, ear pain but better after he cleaned them yesterday, also requesting follow up appt for starting me depression meds last month    Onset: 2 days ago; worsening    Associated Symptoms: reduced appetite, reduced fluid intake    Pain Severity: 6/10; stabbing; constant    Temperature: unknown, pt doesn't have thermometer     What has been tried: nothing    Recommended disposition: See in Office Today UCC/ER if unable to schedule    Care advice provided, patient verbalizes understanding; denies any other questions or concerns; instructed to call back for any new or worsening symptoms. Patient/Caller agrees with recommended disposition; writer provided warm transfer to [facility] at [facility] for appointment scheduling     Attention Provider: Thank you for allowing me to participate in the care of your patient. The patient was connected to triage in response to information provided to the ECC/PSC. Please do not respond through this encounter as the response is not directed to a shared pool.         Reason for Disposition   Patient wants to be seen    Protocols used: SORE THROAT-ADULT-OH

## 2022-03-07 NOTE — PROGRESS NOTES
3/7/2022    TELEHEALTH EVALUATION -- Audio/Visual (During YVJTM-27 public health emergency)    HPI:    Fady Costa (:  2000) has requested an audio/video evaluation for the following concern(s):    VV for a 2 day history of sore throat, congestion, sinus pressure, undocumented fever, chills, PND. Denies HA, body aches, dyspnea,  cough. Using nasal rinse prn   Not taking anything else to help with symptoms  Denies any known exposures to flu, covid or strep   Reports symptoms are not changing     Review of Systems    Prior to Visit Medications    Medication Sig Taking? Authorizing Provider   sertraline (ZOLOFT) 25 MG tablet Take 1 tablet by mouth daily Yes Blondell Haydee, APRN - CNP   famotidine (PEPCID) 20 MG tablet TAKE 1 TABLET BY MOUTH EVERY MORNING BEFORE BREAKFAST AS NEEDED FOR GERD Yes Blondell Juniper, APRN - CNP   dicyclomine (BENTYL) 20 MG tablet Take 1 tablet by mouth 3 times daily as needed (abdominal spasms) Yes Blondell Juniper, APRN - CNP   ibuprofen (ADVIL;MOTRIN) 600 MG tablet Take 1 tablet by mouth every 6 hours as needed for Pain  Roxie Quintanilla PA-C       Social History     Tobacco Use    Smoking status: Current Every Day Smoker     Packs/day: 0.25     Years: 4.00     Pack years: 1.00     Types: Cigarettes    Smokeless tobacco: Never Used   Vaping Use    Vaping Use: Former    Substances: Always   Substance Use Topics    Alcohol use: Yes     Comment: once every three months    Drug use: Not Currently     Comment: pt reports he smokes lavendar. PHYSICAL EXAMINATION:  [ INSTRUCTIONS:  \"[x]\" Indicates a positive item  \"[]\" Indicates a negative item  -- DELETE ALL ITEMS NOT EXAMINED]  Vital Signs: (As obtained by patient/caregiver or practitioner observation)    Patient-Reported Vitals 2022   Patient-Reported Pulse 96       Physical Exam  Constitutional:       General: He is not in acute distress. Appearance: Normal appearance.  He is ill-appearing (not acutely). HENT:      Head: Normocephalic and atraumatic. Nose:      Comments: Sounds congested   Pulmonary:      Effort: Pulmonary effort is normal. No respiratory distress. Comments: No conversational dyspnea or cough noted on VV  Neurological:      General: No focal deficit present. Mental Status: He is alert and oriented to person, place, and time. Mental status is at baseline. Psychiatric:         Mood and Affect: Mood normal.         Behavior: Behavior normal.          Other pertinent observable physical exam findings-     Due to this being a TeleHealth encounter, evaluation of the following organ systems is limited: Vitals/Constitutional/EENT/Resp/CV/GI//MS/Neuro/Skin/Heme-Lymph-Imm. ASSESSMENT/PLAN:  Acute URI/ sore throat  Uncontrolled. Will go to [facility] in am for covid test   Will come here for flu and strep test   - COVID-19; Future  - POCT rapid strep A  - POCT Influenza A/B  Supportive care reviewed  Humidified air  Honey lemon tea  Nasal rinse prn  Flonase daily  NSAIDs/ tylenol for pain and fever  Mucinex prn for congestion   If covid +  Asa daily   Ambulation benefits reviewed- encouraged   Daily multivitamin with zinc  Prone sleeping for SOB   covid transmission precautions reviewed   Check pulse ox and go to ED/ urgent care if drops below 92% and/ or uncontrolled worsening dyspnea       No follow-ups on file. An  electronic signature was used to authenticate this note. --RADHA Gaona - CNP on 3/7/2022 at 4:02 PM        [facility], was evaluated through a synchronous (real-time) audio-video encounter. The patient (or guardian if applicable) is aware that this is a billable service, which includes applicable co-pays. This Virtual Visit was conducted with patient's (and/or legal guardian's) consent.  The visit was conducted pursuant to the emergency declaration under the [address] [place], [address] waiver authority and the [employer] and [employer] General Act. Patient identification was verified, and a caregiver was present when appropriate. The patient was located at home in a state where the provider was licensed to provide care.

## 2022-03-16 ENCOUNTER — TELEMEDICINE (OUTPATIENT)
Dept: INTERNAL MEDICINE CLINIC | Age: 22
End: 2022-03-16
Payer: COMMERCIAL

## 2022-03-16 DIAGNOSIS — F33.2 SEVERE EPISODE OF RECURRENT MAJOR DEPRESSIVE DISORDER, WITHOUT PSYCHOTIC FEATURES (HCC): ICD-10-CM

## 2022-03-16 DIAGNOSIS — F41.9 ANXIETY: ICD-10-CM

## 2022-03-16 DIAGNOSIS — R10.9 ABDOMINAL SPASMS: Primary | ICD-10-CM

## 2022-03-16 DIAGNOSIS — F17.200 SMOKER: ICD-10-CM

## 2022-03-16 PROCEDURE — G8427 DOCREV CUR MEDS BY ELIG CLIN: HCPCS | Performed by: NURSE PRACTITIONER

## 2022-03-16 PROCEDURE — 4004F PT TOBACCO SCREEN RCVD TLK: CPT | Performed by: NURSE PRACTITIONER

## 2022-03-16 PROCEDURE — G8419 CALC BMI OUT NRM PARAM NOF/U: HCPCS | Performed by: NURSE PRACTITIONER

## 2022-03-16 PROCEDURE — 99213 OFFICE O/P EST LOW 20 MIN: CPT | Performed by: NURSE PRACTITIONER

## 2022-03-16 PROCEDURE — G8484 FLU IMMUNIZE NO ADMIN: HCPCS | Performed by: NURSE PRACTITIONER

## 2022-03-16 RX ORDER — DICYCLOMINE HCL 20 MG
20 TABLET ORAL 3 TIMES DAILY PRN
Qty: 90 TABLET | Refills: 0 | Status: SHIPPED | OUTPATIENT
Start: 2022-03-16 | End: 2022-06-28 | Stop reason: SDUPTHER

## 2022-03-16 RX ORDER — SERTRALINE HYDROCHLORIDE 25 MG/1
25 TABLET, FILM COATED ORAL DAILY
Qty: 90 TABLET | Refills: 0 | Status: SHIPPED | OUTPATIENT
Start: 2022-03-16 | End: 2022-06-17 | Stop reason: SDUPTHER

## 2022-03-16 RX ORDER — FAMOTIDINE 20 MG/1
TABLET, FILM COATED ORAL
Qty: 90 TABLET | Refills: 0 | Status: SHIPPED | OUTPATIENT
Start: 2022-03-16 | End: 2022-06-28 | Stop reason: SDUPTHER

## 2022-03-16 ASSESSMENT — PATIENT HEALTH QUESTIONNAIRE - PHQ9
10. IF YOU CHECKED OFF ANY PROBLEMS, HOW DIFFICULT HAVE THESE PROBLEMS MADE IT FOR YOU TO DO YOUR WORK, TAKE CARE OF THINGS AT HOME, OR GET ALONG WITH OTHER PEOPLE: 0
5. POOR APPETITE OR OVEREATING: 1
SUM OF ALL RESPONSES TO PHQ QUESTIONS 1-9: 4
2. FEELING DOWN, DEPRESSED OR HOPELESS: 1
SUM OF ALL RESPONSES TO PHQ QUESTIONS 1-9: 4
8. MOVING OR SPEAKING SO SLOWLY THAT OTHER PEOPLE COULD HAVE NOTICED. OR THE OPPOSITE, BEING SO FIGETY OR RESTLESS THAT YOU HAVE BEEN MOVING AROUND A LOT MORE THAN USUAL: 0
4. FEELING TIRED OR HAVING LITTLE ENERGY: 0
9. THOUGHTS THAT YOU WOULD BE BETTER OFF DEAD, OR OF HURTING YOURSELF: 0
6. FEELING BAD ABOUT YOURSELF - OR THAT YOU ARE A FAILURE OR HAVE LET YOURSELF OR YOUR FAMILY DOWN: 0
SUM OF ALL RESPONSES TO PHQ QUESTIONS 1-9: 4
7. TROUBLE CONCENTRATING ON THINGS, SUCH AS READING THE NEWSPAPER OR WATCHING TELEVISION: 2
SUM OF ALL RESPONSES TO PHQ QUESTIONS 1-9: 4
SUM OF ALL RESPONSES TO PHQ9 QUESTIONS 1 & 2: 1
3. TROUBLE FALLING OR STAYING ASLEEP: 0
1. LITTLE INTEREST OR PLEASURE IN DOING THINGS: 0

## 2022-03-16 ASSESSMENT — ENCOUNTER SYMPTOMS
SHORTNESS OF BREATH: 0
NAUSEA: 0
DIARRHEA: 0
CONSTIPATION: 0
COUGH: 0
ABDOMINAL PAIN: 0
WHEEZING: 0
VOMITING: 0

## 2022-03-16 NOTE — PROGRESS NOTES
3/16/2022  TELEHEALTH EVALUATION -- Audio/Visual (During CZBKP-54 public health emergency)    Subjective:  Chief Complaint   Patient presents with    Depression     Pt has missed a couple doses but overall doing okay on the medication      HPI:  Baark Wilhelm (:  2000) has requested an audio/video evaluation for the following concern(s):    Abdominal spasms- Pepcid daily was prescribed with as needed Bentyl (uses 4 days a week PRN). States this is working very well and he would like to continue on this regimen PRN. Denies N/V/D/C. Denies blood in stool.     Anxiety/depression-  Seeing psychology. Last visit, pt was prescribed zoloft 25 mg PO daily. States \"I've been doing pretty good on the medication. \" States his mood is doing better- \"much better. It very much helped. \" States his motivation is improved as well. States he does have intermittent anxiety attacks, but not recently- none since last visit.     Smoker- no longer using cigars. Using the vape- but plans to cut down on his own. Review of Systems   Constitutional: Negative for chills, fatigue, fever and unexpected weight change. Eyes: Negative for visual disturbance. Respiratory: Negative for cough, shortness of breath and wheezing. Cardiovascular: Negative for chest pain, palpitations and leg swelling. Gastrointestinal: Negative for abdominal pain, constipation, diarrhea, nausea and vomiting. Skin: Negative for pallor and rash. Neurological: Negative for dizziness, weakness, light-headedness, numbness and headaches. Psychiatric/Behavioral: Negative for dysphoric mood, self-injury, sleep disturbance and suicidal ideas. The patient is not nervous/anxious.       No Known Allergies    Current Outpatient Rx   Medication Sig Dispense Refill    sertraline (ZOLOFT) 25 MG tablet Take 1 tablet by mouth daily 90 tablet 0    dicyclomine (BENTYL) 20 MG tablet Take 1 tablet by mouth 3 times daily as needed (abdominal spasms) 90 tablet 0  famotidine (PEPCID) 20 MG tablet TAKE 1 TABLET BY MOUTH EVERY MORNING BEFORE BREAKFAST AS NEEDED FOR GERD 90 tablet 0     Patient Active Problem List   Diagnosis    Left sided sciatica    Anxiety    Left leg numbness      Wt Readings from Last 3 Encounters:   08/10/21 196 lb (88.9 kg)   08/03/21 196 lb 12.8 oz (89.3 kg)   07/26/21 191 lb (86.6 kg)     BP Readings from Last 3 Encounters:   08/03/21 104/70   07/26/21 (!) 128/96   07/26/21 115/75     The ASCVD Risk score (Michael Miller, et al., 2013) failed to calculate for the following reasons: The 2013 ASCVD risk score is only valid for ages 36 to 78    PHQ-9 Total Score: 4 (3/16/2022  8:00 AM)  Thoughts that you would be better off dead, or of hurting yourself in some way: 0 (3/16/2022  8:00 AM)    Objective/Physical Exam:  Virtual Video Exam  Patient-Reported Vitals 3/16/2022   Patient-Reported Weight 185-190 lb   Patient-Reported Height 5'11   Patient-Reported Systolic (No Data)   Patient-Reported Diastolic (No Data)   Patient-Reported Pulse 88    ^no access to other VS d/t VV per pt. Physical Exam  Constitutional:       General: He is not in acute distress. Appearance: Normal appearance. He is not ill-appearing. Pulmonary:      Effort: Pulmonary effort is normal. No respiratory distress. Skin:     Coloration: Skin is not jaundiced or pale. Neurological:      Mental Status: He is alert. Comments: No facial asymmetry noted   Psychiatric:         Mood and Affect: Mood normal.     Due to this being a TeleHealth encounter, evaluation of the following organ systems is limited: Vitals/Constitutional/EENT/Resp/CV/GI//MS/Neuro/Skin/Heme-Lymph-Imm. Assessment and Plan:  Kristian Solis was seen today for depression. Diagnoses and all orders for this visit:    Abdominal spasms  -     Well controlled with as needed use. Denies side effects.   - Continue current regimen  - dicyclomine (BENTYL) 20 MG tablet;  Take 1 tablet by mouth 3 times daily as needed (abdominal spasms)  -     famotidine (PEPCID) 20 MG tablet; TAKE 1 TABLET BY MOUTH EVERY MORNING BEFORE BREAKFAST AS NEEDED FOR GERD    Severe episode of recurrent major depressive disorder, without psychotic features (HCC)/Anxiety  -     Last PHQ9 was 19. Today's PHQ9 is 4. Denies SI/HI. - States his mood is significantly improved. He is taking the medication without side effects.  - Continue current regimen  - Continue with psychology. - sertraline (ZOLOFT) 25 MG tablet; Take 1 tablet by mouth daily- refilled today    Smoker   - Cessation recommended    Return in about 3 months (around 6/16/2022) for annual physical, or sooner if needed. Pt will call if symptoms worsen or fail to improve. Phi Rodas, was evaluated through a synchronous (real-time) audio-video encounter. The patient (or guardian if applicable) is aware that this is a billable service, which includes applicable co-pays. This Virtual Visit was conducted with patient's (and/or legal guardian's) consent. The visit was conducted pursuant to the emergency declaration under the [address] [place] [address], [address] [place] [address] authority and the [employer] General Act. Patient identification was verified, and a caregiver was present when appropriate. The patient was located in a state where the provider was licensed to provide care. Consent:  He and/or health care decision maker is aware that that he may receive a bill for this virtual service, depending on his insurance coverage, and has provided verbal consent to proceed: Yes    Patient identification was verified at the start of the visit: Yes    Total time spent on this encounter: Not billed by time    Services were provided through a video synchronous discussion virtually to substitute for in-person clinic visit.      --Miko Larios, RADHA - CNP on 3/16/2022 at 8:20 AM    An electronic signature was used to authenticate this note.

## 2022-05-22 ENCOUNTER — HOSPITAL ENCOUNTER (EMERGENCY)
Age: 22
Discharge: HOME | End: 2022-05-22
Attending: EMERGENCY MEDICINE
Payer: COMMERCIAL

## 2022-05-22 VITALS
SYSTOLIC BLOOD PRESSURE: 126 MMHG | DIASTOLIC BLOOD PRESSURE: 80 MMHG | OXYGEN SATURATION: 99 % | BODY MASS INDEX: 27.43 KG/M2 | WEIGHT: 195.9 LBS | RESPIRATION RATE: 19 BRPM | HEART RATE: 98 BPM | TEMPERATURE: 98.3 F | HEIGHT: 71 IN

## 2022-05-22 DIAGNOSIS — B34.9 VIRAL ILLNESS: Primary | ICD-10-CM

## 2022-05-22 LAB
ANION GAP SERPL CALCULATED.3IONS-SCNC: 10 MMOL/L (ref 3–16)
BASOPHILS ABSOLUTE: 0 K/UL (ref 0–0.2)
BASOPHILS RELATIVE PERCENT: 0.8 %
BUN BLDV-MCNC: 9 MG/DL (ref 7–20)
CALCIUM SERPL-MCNC: 9.1 MG/DL (ref 8.3–10.6)
CHLORIDE BLD-SCNC: 104 MMOL/L (ref 99–110)
CO2: 25 MMOL/L (ref 21–32)
CREAT SERPL-MCNC: 0.7 MG/DL (ref 0.9–1.3)
EOSINOPHILS ABSOLUTE: 0.1 K/UL (ref 0–0.6)
EOSINOPHILS RELATIVE PERCENT: 1.3 %
GFR AFRICAN AMERICAN: >60
GFR NON-AFRICAN AMERICAN: >60
GLUCOSE BLD-MCNC: 94 MG/DL (ref 70–99)
HCT VFR BLD CALC: 43.9 % (ref 40.5–52.5)
HEMOGLOBIN: 15.1 G/DL (ref 13.5–17.5)
INFLUENZA A: NOT DETECTED
INFLUENZA B: NOT DETECTED
LYMPHOCYTES ABSOLUTE: 1.5 K/UL (ref 1–5.1)
LYMPHOCYTES RELATIVE PERCENT: 32.7 %
MCH RBC QN AUTO: 30.2 PG (ref 26–34)
MCHC RBC AUTO-ENTMCNC: 34.5 G/DL (ref 31–36)
MCV RBC AUTO: 87.6 FL (ref 80–100)
MONOCYTES ABSOLUTE: 0.5 K/UL (ref 0–1.3)
MONOCYTES RELATIVE PERCENT: 10 %
NEUTROPHILS ABSOLUTE: 2.6 K/UL (ref 1.7–7.7)
NEUTROPHILS RELATIVE PERCENT: 55.2 %
PDW BLD-RTO: 13 % (ref 12.4–15.4)
PLATELET # BLD: 129 K/UL (ref 135–450)
PMV BLD AUTO: 8 FL (ref 5–10.5)
POTASSIUM SERPL-SCNC: 4.7 MMOL/L (ref 3.5–5.1)
RBC # BLD: 5.01 M/UL (ref 4.2–5.9)
SARS-COV-2 RNA, RT PCR: NOT DETECTED
SODIUM BLD-SCNC: 139 MMOL/L (ref 136–145)
TOTAL CK: 166 U/L (ref 39–308)
WBC # BLD: 4.6 K/UL (ref 4–11)

## 2022-05-22 PROCEDURE — 87636 SARSCOV2 & INF A&B AMP PRB: CPT

## 2022-05-22 PROCEDURE — 85025 COMPLETE CBC W/AUTO DIFF WBC: CPT

## 2022-05-22 PROCEDURE — 99283 EMERGENCY DEPT VISIT LOW MDM: CPT

## 2022-05-22 PROCEDURE — 82550 ASSAY OF CK (CPK): CPT

## 2022-05-22 PROCEDURE — 6370000000 HC RX 637 (ALT 250 FOR IP): Performed by: PHYSICIAN ASSISTANT

## 2022-05-22 PROCEDURE — 80048 BASIC METABOLIC PNL TOTAL CA: CPT

## 2022-05-22 PROCEDURE — 36415 COLL VENOUS BLD VENIPUNCTURE: CPT

## 2022-05-22 RX ORDER — IBUPROFEN 600 MG/1
600 TABLET ORAL ONCE
Status: COMPLETED | OUTPATIENT
Start: 2022-05-22 | End: 2022-05-22

## 2022-05-22 RX ADMIN — IBUPROFEN 600 MG: 600 TABLET ORAL at 20:32

## 2022-05-22 ASSESSMENT — ENCOUNTER SYMPTOMS
NAUSEA: 0
SHORTNESS OF BREATH: 0
RHINORRHEA: 0
ABDOMINAL PAIN: 0
VOMITING: 0
COUGH: 0
DIARRHEA: 0

## 2022-05-22 ASSESSMENT — PAIN SCALES - GENERAL: PAINLEVEL_OUTOF10: 7

## 2022-05-22 ASSESSMENT — PAIN - FUNCTIONAL ASSESSMENT: PAIN_FUNCTIONAL_ASSESSMENT: 0-10

## 2022-05-23 NOTE — ED PROVIDER NOTES
[facility]        Pt Name: Jeanette Phillips  MRN: [id number]  [place] 2000  Date of evaluation: 5/22/2022  Provider: Cailin Norris PA-C  PCP: RADHA Deleon CNP  Note Started: 8:36 PM EDT        I have seen and evaluated this patient with my supervising physician Felipe Winchester MD.    [facility]       Chief Complaint   Patient presents with    Illness     Pt was seen at [facility] ans swabbed for strep and covid both negative. Pt states not getting any better and feeling worse, fatigued, sore throat, and rash sporadically over body. Pt c/o pain in arms and legs       HISTORY OF PRESENT ILLNESS   (Location, Timing/Onset, Context/Setting, Quality, Duration, Modifying Factors, Severity, Associated Signs and Symptoms)  Note limiting factors. Chief Complaint: Fever, generalized body ache    Jeanette Phillips is a 23 y.o. male who presents to the emergency department today for evaluation for generalized body aches. The patient states that for the past 2 days he has had fever as high as 104 at home, however he is afebrile here. Patient states that he has been experiencing generalized body aches, and he has been rating his body aches as a 7/10. Patient has no specific chest pain, shortness of breath or abdominal pain. He is not any cough or congestion. He denies any nausea, vomiting or diarrhea. No urinary symptoms. He was seen in urgent care earlier was negative for strep, and COVID although was not swabbed for flu. Patient otherwise has no other complaints at this time    Nursing Notes were all reviewed and agreed with or any disagreements were addressed in the HPI. REVIEW OF SYSTEMS    (2-9 systems for level 4, 10 or more for level 5)     Review of Systems   Constitutional: Positive for fever. Negative for activity change, appetite change and chills. HENT: Negative for congestion and rhinorrhea. Respiratory: Negative for cough and shortness of breath. Cardiovascular: Negative for chest pain. Gastrointestinal: Negative for abdominal pain, diarrhea, nausea and vomiting. Genitourinary: Negative for difficulty urinating, dysuria and hematuria. Musculoskeletal: Positive for myalgias. Neurological: Negative for weakness and numbness. Positives and Pertinent negatives as per HPI. Except as noted above in the ROS, all other systems were reviewed and negative. PAST MEDICAL HISTORY     Past Medical History:   Diagnosis Date    Anxiety     GERD (gastroesophageal reflux disease)     Sciatica, left side     left leg         SURGICAL HISTORY     Past Surgical History:   Procedure Laterality Date    LUMBAR SPINE SURGERY N/A 7/26/2021    MICROLUMBAR DISCECTOMY, L5-S1 performed by Sara Garland MD at [place] [address] Left 8/31/2020    LEFT L5 AND S1 TRANSFORAMINAL EPIDURAL STEROID INJECTION [facility] 6/10/2021    LEFT L5 TRANSFORAMINAL EPIDURAL STEROID INJECTION WITH FLUOROSCOPY performed by Stephanie Morales MD at [address] [facility]       Previous Medications    DICYCLOMINE (BENTYL) 20 MG TABLET    Take 1 tablet by mouth 3 times daily as needed (abdominal spasms)    FAMOTIDINE (PEPCID) 20 MG TABLET    TAKE 1 TABLET BY MOUTH EVERY MORNING BEFORE BREAKFAST AS NEEDED FOR GERD    SERTRALINE (ZOLOFT) 25 MG TABLET    Take 1 tablet by mouth daily         ALLERGIES     Patient has no known allergies.     FAMILYHISTORY       Family History   Problem Relation Age of Onset    Depression Mother     Anemia Mother     High Blood Pressure Mother     Alcohol Abuse Mother     Alcohol Abuse Father     Prostate Cancer Neg Hx     Heart Attack Neg Hx     Stroke Neg Hx           SOCIAL HISTORY       Social History     Tobacco Use    Smoking status: Current Every Day Smoker     Packs/day: 0.25     Years: 4.00     Pack years: 1.00     Types: E-Cigarettes    Smokeless tobacco: Never Used   Vaping Use    Vaping Use: Every day    Substances: Always   Substance Use Topics    Alcohol use: Yes     Comment: once every three months    Drug use: Not Currently     Comment: pt reports he smokes lavendar. SCREENINGS    [place] Coma Scale  Eye Opening: Spontaneous  Best Verbal Response: Oriented  Best Motor Response: Obeys commands  [place] Coma Scale Score: 15        PHYSICAL EXAM    (up to 7 for level 4, 8 or more for level 5)     ED Triage Vitals [05/22/22 2024]   BP Temp Temp Source Pulse Resp SpO2 Height Weight   126/80 98.3 °F (36.8 °C) Oral 98 19 99 % 5' 11\" (1.803 m) 195 lb 14.4 oz (88.9 kg)       Physical Exam  Vitals and nursing note reviewed. Constitutional:       Appearance: He is well-developed. He is not diaphoretic. HENT:      Head: Normocephalic and atraumatic. Right Ear: External ear normal.      Left Ear: External ear normal.      Nose: Nose normal.      Mouth/Throat:      Mouth: Mucous membranes are moist.      Pharynx: Oropharynx is clear. No posterior oropharyngeal erythema. Eyes:      General:         Right eye: No discharge. Left eye: No discharge. Extraocular Movements: Extraocular movements intact. Conjunctiva/sclera: Conjunctivae normal.      Pupils: Pupils are equal, round, and reactive to light. Neck:      Trachea: No tracheal deviation. Cardiovascular:      Rate and Rhythm: Normal rate and regular rhythm. Heart sounds: No murmur heard. Pulmonary:      Effort: Pulmonary effort is normal. No respiratory distress. Breath sounds: Normal breath sounds. No wheezing or rales. Abdominal:      General: Bowel sounds are normal. There is no distension. Palpations: Abdomen is soft. Tenderness: There is no abdominal tenderness. There is no guarding. Musculoskeletal:         General: Normal range of motion.       Cervical back: Normal range of medications:  Medications   ibuprofen (ADVIL;MOTRIN) tablet 600 mg (600 mg Oral Given 5/22/22 2032)         Is this patient to be included in the SEP-1 Core Measure due to severe sepsis or septic shock? No   Exclusion criteria - the patient is NOT to be included for SEP-1 Core Measure due to:  Viral etiology found or highly suspected (including COVID-19) without concomitant bacterial infection    Briefly, this is a 69-year-old male who presents to the emergency department today with fever, generalized body aches. This has been ongoing for the past 4 days. Has had a mitten sore throat. Was seen at an outside facility however was negative for COVID ER strep. On examination, he there are no neurological deficits. His posterior oropharynx is unremarkable. Vital signs stable    CBC shows no evidence of leukocytosis or anemia. BMP is unremarkable. CK is normal.  COVID and flu are negative. Symptoms today are most likely viral in nature. Recommended supportive care at home, I recommended close follow-up with his primary care physician within 2 to 3 days. He is to return to the ED for any new or worsening symptoms, the patient voiced understanding is agreeable with plan. Stable for discharge. My suspicion is low at this time for pneumonia, pleural effusion, pneumothorax, acute restaurant distress, hypoxemia, or other emergent etiology    FINAL IMPRESSION      1.  Viral illness          DISPOSITION/PLAN   DISPOSITION Discharge - Pending Orders Complete 05/22/2022 09:25:45 PM      PATIENT REFERRED TO:  RADHA Donahue - FRANCHESCA  [address] [facility]  [phone]    Schedule an appointment as soon as possible for a visit in 2 days      [facility] Emergency Department  [address] [facility]  [phone]    As needed, If symptoms worsen      DISCHARGE MEDICATIONS:  New Prescriptions    No medications on file       DISCONTINUED MEDICATIONS:  Discontinued Medications    No medications on file              (Please note that portions of this note were completed with a voice recognition program.  Efforts were made to edit the dictations but occasionally words are mis-transcribed.)    Susie Lea PA-C (electronically signed)            Susie Lea PA-C  05/22/22 [facility]  05/22/22 0581

## 2022-05-23 NOTE — ED PROVIDER NOTES
This patient was seen by the Mid-Level Provider. I have seen and examined the patient, agree with the workup, evaluation, management and diagnosis. Care plan has been discussed. My assessment reveals a 25-year-old male who presents with fatigue and a sore throat. This is a 25-year-old male who presents with a fatigue and a sore throat. The patient also states has had a sporadic rash that is itchy. He also complains of pain in his arms and legs and muscle pain when he tries to open bottles. He denies any abdominal pain. He states he has run some fevers. He denies any dysuria or hematuria. He denies any neck pain or stiffness. Radiology results:    No orders to display         LABS:    Labs Reviewed   CBC WITH AUTO DIFFERENTIAL - Abnormal; Notable for the following components:       Result Value    Platelets 072 (*)     All other components within normal limits   BASIC METABOLIC PANEL - Abnormal; Notable for the following components:    CREATININE 0.7 (*)     All other components within normal limits   COVID-19 & INFLUENZA COMBO   CK               Exam:    Well-nourished male in no acute distress. He had no meningeal signs. He was nontoxic-appearing. The patient had a few scattered areas of a maculopapular rash on his left forearm consistent with a possible contact dermatitis along the left thorax area and left leg. There are no signs of any petechia or purpura. Medical decision makin-year-old male presents with a viral type of illness. The patient's work-up today included repeat COVID-19, influenza that are negative. The patient CBC and chemistry profile are normal as well. The patient has no signs of any significant illness. There is appears to be consistent with a virus. The patient was discharged with instruction to use Tylenol or Motrin for his body aches and pains. He was referred back to his primary care physician and told to return if worse. FINAL IMPRESSION:    1. Viral illness            Perry Collins MD  05/22/22 3896

## 2022-05-25 ENCOUNTER — TELEMEDICINE (OUTPATIENT)
Dept: INTERNAL MEDICINE CLINIC | Age: 22
End: 2022-05-25
Payer: COMMERCIAL

## 2022-05-25 DIAGNOSIS — B34.9 VIRAL ILLNESS: Primary | ICD-10-CM

## 2022-05-25 PROCEDURE — G8427 DOCREV CUR MEDS BY ELIG CLIN: HCPCS | Performed by: NURSE PRACTITIONER

## 2022-05-25 PROCEDURE — 99213 OFFICE O/P EST LOW 20 MIN: CPT | Performed by: NURSE PRACTITIONER

## 2022-05-25 PROCEDURE — 4004F PT TOBACCO SCREEN RCVD TLK: CPT | Performed by: NURSE PRACTITIONER

## 2022-05-25 PROCEDURE — G8419 CALC BMI OUT NRM PARAM NOF/U: HCPCS | Performed by: NURSE PRACTITIONER

## 2022-05-25 NOTE — PROGRESS NOTES
2022    TELEHEALTH EVALUATION -- Audio/Visual (During CQAMS-90 public health emergency)    HPI:    Leslie Vallejo (:  2000) has requested an audio/video evaluation for the following concern(s):    \"I had muscle swelling and couldn't articulate my fingers, all my muscles were swollen. \"      Fever 104 is resolved. Symptoms have mostly resolved. \"I am taking motrin and Tylenol. \"    He was seen in the emergency department on 2022 with illness. COVID and influenza testing were negative. Other labs including BMP, CBC, CK were unremarkable  Patient's symptoms are felt to be related to acute viral illness. He was treated with ibuprofen and released. Review of Systems   Constitutional: Positive for fever. Musculoskeletal: Positive for arthralgias and myalgias. Prior to Visit Medications    Medication Sig Taking? Authorizing Provider   sertraline (ZOLOFT) 25 MG tablet Take 1 tablet by mouth daily Yes RADHA Bains CNP   dicyclomine (BENTYL) 20 MG tablet Take 1 tablet by mouth 3 times daily as needed (abdominal spasms) Yes RADHA Bains CNP   famotidine (PEPCID) 20 MG tablet TAKE 1 TABLET BY MOUTH EVERY MORNING BEFORE BREAKFAST AS NEEDED FOR GERD Yes RADHA Bains CNP   ibuprofen (ADVIL;MOTRIN) 600 MG tablet Take 1 tablet by mouth every 6 hours as needed for Pain  Batsheva Ritter PA-C       Social History     Tobacco Use    Smoking status: Current Every Day Smoker     Packs/day: 0.25     Years: 4.00     Pack years: 1.00     Types: E-Cigarettes    Smokeless tobacco: Never Used   Vaping Use    Vaping Use: Every day    Substances: Always   Substance Use Topics    Alcohol use: Yes     Comment: once every three months    Drug use: Not Currently     Comment: pt reports he smokes lavendar.              PHYSICAL EXAMINATION:  [ INSTRUCTIONS:  \"[x]\" Indicates a positive item  \"[]\" Indicates a negative item  -- DELETE ALL ITEMS NOT EXAMINED]  Vital Signs: (As obtained by patient/caregiver or practitioner observation)    Blood pressure-  Heart rate-    Respiratory rate-    Temperature-  Pulse oximetry-     Constitutional: [x] Appears well-developed and well-nourished [x] No apparent distress      [] Abnormal-   Mental status  [x] Alert and awake  [x] Oriented to person/place/time [x]Able to follow commands      Eyes:  EOM    [x]  Normal  [] Abnormal-  Sclera  [x]  Normal  [] Abnormal -         Discharge [x]  None visible  [] Abnormal -    HENT:   [x] Normocephalic, atraumatic. [] Abnormal   [x] Mouth/Throat: Mucous membranes are moist.     External Ears [x] Normal  [] Abnormal-     Neck: [x] No visualized mass     Pulmonary/Chest: [x] Respiratory effort normal.  [x] No visualized signs of difficulty breathing or respiratory distress        [] Abnormal-      Musculoskeletal:   [x] Normal gait with no signs of ataxia         [x] Normal range of motion of neck        [] Abnormal-       Neurological:        [x] No Facial Asymmetry (Cranial nerve 7 motor function) (limited exam to video visit)          [x] No gaze palsy        [] Abnormal-         Skin:        [x] No significant exanthematous lesions or discoloration noted on facial skin         [] Abnormal-            Psychiatric:       [x] Normal Affect [x] No Hallucinations        [] Abnormal-     Other pertinent observable physical exam findings-     ASSESSMENT/PLAN:  1. Viral illness  -Evaluated in urgent care and emergency department  -COVID and flu testing negative  -Labs unremarkable  -Symptoms felt to be representative of viral illness, treated with NSAID  -He reports fever has resolved. He is getting better using anti-inflammatories  -Continue current treatment      No follow-ups on file. Phi Rodas, was evaluated through a synchronous (real-time) audio-video encounter.  The patient (or guardian if applicable) is aware that this is a billable service, which includes applicable co-pays. This Virtual Visit was conducted with patient's (and/or legal guardian's) consent. The visit was conducted pursuant to the emergency declaration under the [address] [place] [address] authority and the [employer] General Act. Patient identification was verified, and a caregiver was present when appropriate. The patient was located at Home: [address] [place] [address]. Provider was located at Home ([address]: [place]. Total time spent on this encounter: Not billed by time    --RADHA Barrera CNP on 5/25/2022 at 8:03 AM    An electronic signature was used to authenticate this note.

## 2022-06-17 DIAGNOSIS — F33.2 SEVERE EPISODE OF RECURRENT MAJOR DEPRESSIVE DISORDER, WITHOUT PSYCHOTIC FEATURES (HCC): ICD-10-CM

## 2022-06-17 RX ORDER — SERTRALINE HYDROCHLORIDE 25 MG/1
25 TABLET, FILM COATED ORAL DAILY
Qty: 90 TABLET | Refills: 0 | OUTPATIENT
Start: 2022-06-17

## 2022-06-17 RX ORDER — SERTRALINE HYDROCHLORIDE 25 MG/1
25 TABLET, FILM COATED ORAL DAILY
Qty: 14 TABLET | Refills: 0 | Status: SHIPPED | OUTPATIENT
Start: 2022-06-17 | End: 2022-06-28 | Stop reason: SDUPTHER

## 2022-06-28 ENCOUNTER — OFFICE VISIT (OUTPATIENT)
Dept: INTERNAL MEDICINE CLINIC | Age: 22
End: 2022-06-28
Payer: COMMERCIAL

## 2022-06-28 VITALS
HEART RATE: 76 BPM | DIASTOLIC BLOOD PRESSURE: 68 MMHG | BODY MASS INDEX: 27.44 KG/M2 | OXYGEN SATURATION: 98 % | HEIGHT: 71 IN | SYSTOLIC BLOOD PRESSURE: 118 MMHG | WEIGHT: 196 LBS

## 2022-06-28 DIAGNOSIS — F17.200 SMOKER: ICD-10-CM

## 2022-06-28 DIAGNOSIS — R10.9 ABDOMINAL SPASMS: ICD-10-CM

## 2022-06-28 DIAGNOSIS — F41.9 ANXIETY: ICD-10-CM

## 2022-06-28 DIAGNOSIS — Z71.85 VACCINE COUNSELING: ICD-10-CM

## 2022-06-28 DIAGNOSIS — F33.2 SEVERE EPISODE OF RECURRENT MAJOR DEPRESSIVE DISORDER, WITHOUT PSYCHOTIC FEATURES (HCC): ICD-10-CM

## 2022-06-28 DIAGNOSIS — D69.6 PLATELETS DECREASED (HCC): ICD-10-CM

## 2022-06-28 DIAGNOSIS — Z11.59 NEED FOR HEPATITIS C SCREENING TEST: ICD-10-CM

## 2022-06-28 DIAGNOSIS — Z00.00 ANNUAL PHYSICAL EXAM: Primary | ICD-10-CM

## 2022-06-28 PROCEDURE — 4004F PT TOBACCO SCREEN RCVD TLK: CPT | Performed by: NURSE PRACTITIONER

## 2022-06-28 PROCEDURE — G8419 CALC BMI OUT NRM PARAM NOF/U: HCPCS | Performed by: NURSE PRACTITIONER

## 2022-06-28 PROCEDURE — 99212 OFFICE O/P EST SF 10 MIN: CPT | Performed by: NURSE PRACTITIONER

## 2022-06-28 PROCEDURE — 99395 PREV VISIT EST AGE 18-39: CPT | Performed by: NURSE PRACTITIONER

## 2022-06-28 PROCEDURE — G8427 DOCREV CUR MEDS BY ELIG CLIN: HCPCS | Performed by: NURSE PRACTITIONER

## 2022-06-28 RX ORDER — DICYCLOMINE HCL 20 MG
20 TABLET ORAL 3 TIMES DAILY PRN
Qty: 90 TABLET | Refills: 0 | Status: SHIPPED | OUTPATIENT
Start: 2022-06-28

## 2022-06-28 RX ORDER — CHOLECALCIFEROL (VITAMIN D3) 125 MCG
5 CAPSULE ORAL NIGHTLY PRN
COMMUNITY

## 2022-06-28 RX ORDER — SERTRALINE HYDROCHLORIDE 25 MG/1
25 TABLET, FILM COATED ORAL DAILY
Qty: 90 TABLET | Refills: 1 | Status: SHIPPED | OUTPATIENT
Start: 2022-06-28

## 2022-06-28 RX ORDER — FAMOTIDINE 20 MG/1
TABLET, FILM COATED ORAL
Qty: 90 TABLET | Refills: 1 | Status: SHIPPED | OUTPATIENT
Start: 2022-06-28

## 2022-06-28 ASSESSMENT — ENCOUNTER SYMPTOMS
SINUS PAIN: 0
SORE THROAT: 0
ABDOMINAL PAIN: 0
WHEEZING: 0
SHORTNESS OF BREATH: 0
CONSTIPATION: 0
DIARRHEA: 0
COUGH: 0
VOMITING: 0
NAUSEA: 0

## 2022-06-28 ASSESSMENT — PATIENT HEALTH QUESTIONNAIRE - PHQ9
3. TROUBLE FALLING OR STAYING ASLEEP: 3
4. FEELING TIRED OR HAVING LITTLE ENERGY: 3
2. FEELING DOWN, DEPRESSED OR HOPELESS: 0
SUM OF ALL RESPONSES TO PHQ QUESTIONS 1-9: 10
SUM OF ALL RESPONSES TO PHQ QUESTIONS 1-9: 10
1. LITTLE INTEREST OR PLEASURE IN DOING THINGS: 0
8. MOVING OR SPEAKING SO SLOWLY THAT OTHER PEOPLE COULD HAVE NOTICED. OR THE OPPOSITE, BEING SO FIGETY OR RESTLESS THAT YOU HAVE BEEN MOVING AROUND A LOT MORE THAN USUAL: 1
5. POOR APPETITE OR OVEREATING: 0
7. TROUBLE CONCENTRATING ON THINGS, SUCH AS READING THE NEWSPAPER OR WATCHING TELEVISION: 3
SUM OF ALL RESPONSES TO PHQ QUESTIONS 1-9: 10
SUM OF ALL RESPONSES TO PHQ9 QUESTIONS 1 & 2: 0
10. IF YOU CHECKED OFF ANY PROBLEMS, HOW DIFFICULT HAVE THESE PROBLEMS MADE IT FOR YOU TO DO YOUR WORK, TAKE CARE OF THINGS AT HOME, OR GET ALONG WITH OTHER PEOPLE: 0
9. THOUGHTS THAT YOU WOULD BE BETTER OFF DEAD, OR OF HURTING YOURSELF: 0
6. FEELING BAD ABOUT YOURSELF - OR THAT YOU ARE A FAILURE OR HAVE LET YOURSELF OR YOUR FAMILY DOWN: 0
SUM OF ALL RESPONSES TO PHQ QUESTIONS 1-9: 10

## 2022-06-28 NOTE — PROGRESS NOTES
Annual Exam Office Visit   6/28/2022    Subjective:  Chief Complaint   Patient presents with    Annual Exam     HPI:  Bridget Shannon is a 23 y.o. male who presents to the clinic today for an annual exam.    Abdominal spasms- Pepcid daily was prescribed with as needed Bentyl (uses 1-2 days a week PRN). States this is working very well and he would like to continue on this regimen PRN. Denies N/V/D/C. Denies blood in stool.     Anxiety/depression- Prescribed zoloft 25 mg PO daily. States this is working well. Denies side effects. States his depression is under good control. States he does have intermittent anxiety attacks, but well controlled with lifestyle modifications. No longer following with psychology.     Smoker- no longer using cigars. Using the vape- plans to cut down on his own. Denies acute concerns. Mom lives nearby. 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 Enjoys video games and computers. Review of Systems   Constitutional: Negative for chills, fatigue and fever. HENT: Negative for congestion, postnasal drip, sinus pain and sore throat. Respiratory: Negative for cough, shortness of breath and wheezing. Cardiovascular: Negative for chest pain, palpitations and leg swelling. Gastrointestinal: Negative for abdominal pain, constipation, diarrhea, nausea and vomiting. Genitourinary: Negative for dysuria, frequency, hematuria and urgency. Skin: Negative for pallor and rash. Neurological: Negative for dizziness, weakness, light-headedness, numbness and headaches. Psychiatric/Behavioral: Negative for dysphoric mood, sleep disturbance and suicidal ideas. The patient is not nervous/anxious.       No Known Allergies     Family History   Problem Relation Age of Onset    Depression Mother     Anemia Mother     High Blood Pressure Mother     Alcohol Abuse Mother     Alcohol Abuse Father     Prostate Cancer Neg Hx     Heart Attack Neg Hx     Stroke Neg Hx      Current Outpatient Rx Medication Sig Dispense Refill    sertraline (ZOLOFT) 25 MG tablet Take 1 tablet by mouth daily 90 tablet 1    melatonin 5 MG TABS tablet Take 5 mg by mouth nightly as needed      famotidine (PEPCID) 20 MG tablet TAKE 1 TABLET BY MOUTH EVERY MORNING BEFORE BREAKFAST AS NEEDED FOR GERD 90 tablet 1    dicyclomine (BENTYL) 20 MG tablet Take 1 tablet by mouth 3 times daily as needed (abdominal spasms) 90 tablet 0     Social History     Socioeconomic History    Marital status: Single     Spouse name: Not on file    Number of children: Not on file    Years of education: Not on file    Highest education level: Not on file   Occupational History    Not on file   Tobacco Use    Smoking status: Current Every Day Smoker     Packs/day: 0.25     Years: 4.00     Pack years: 1.00     Types: E-Cigarettes    Smokeless tobacco: Never Used   Vaping Use    Vaping Use: Every day    Substances: Always   Substance and Sexual Activity    Alcohol use: Yes     Comment: once every three months    Drug use: Not Currently    Sexual activity: Not Currently     Partners: Female   Other Topics Concern    Not on file   Social History Narrative    Mom lives nearby. 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 Enjoys [employer] games and [place]. Social Determinants of Health     Financial Resource Strain: High Risk    Difficulty of Paying Living Expenses: Very hard   Food Insecurity: No Food Insecurity    Worried About Running Out of Food in the Last Year: Never true    [place] of Food in the Last Year: Never true   Transportation Needs:     Lack of Transportation (Medical): Not on file    Lack of Transportation (Non-Medical):  Not on file   Physical Activity:     Days of Exercise per Week: Not on file    Minutes of Exercise per Session: Not on file   Stress:     Feeling of Stress : Not on file   Social Connections:     Frequency of Communication with Friends and Family: Not on file    Frequency of Social Gatherings with Friends and Family: Not on file    Attends Congregational Services: Not on file    Active Member of Clubs or Organizations: Not on file    Attends Club or Organization Meetings: Not on file    Marital Status: Not on file   Intimate Partner Violence:     Fear of Current or Ex-Partner: Not on file    Emotionally Abused: Not on file    Physically Abused: Not on file    Sexually Abused: Not on file   Housing Stability:     Unable to Pay for Housing in the Last Year: Not on file    Number of [place] in the Last Year: Not on file    Unstable Housing in the Last Year: Not on file     Past Medical History:   Diagnosis Date    Anxiety     Depression     GERD (gastroesophageal reflux disease)     Sciatica, left side     left leg    Smoker      Patient Active Problem List   Diagnosis    Left sided sciatica    Anxiety    Left leg numbness      Wt Readings from Last 3 Encounters:   06/28/22 196 lb (88.9 kg)   05/22/22 195 lb 14.4 oz (88.9 kg)   08/10/21 196 lb (88.9 kg)     BP Readings from Last 3 Encounters:   06/28/22 118/68   05/22/22 126/80   08/03/21 104/70     The ASCVD Risk score (Chikis Hoffmann et al., 2013) failed to calculate for the following reasons: The 2013 ASCVD risk score is only valid for ages 36 to 78    PHQ-9 Total Score: 10 (6/28/2022  4:18 PM)  Thoughts that you would be better off dead, or of hurting yourself in some way: 0 (6/28/2022  4:18 PM)    Objective/Physical Exam:  /68 (Site: Left Upper Arm)   Pulse 76   Ht 5' 11\" (1.803 m)   Wt 196 lb (88.9 kg)   SpO2 98%   BMI 27.34 kg/m²   Body mass index is 27.34 kg/m². Physical Exam  Vitals reviewed. Constitutional:       General: He is not in acute distress. Appearance: He is well-developed. He is not diaphoretic. HENT:      Head: Normocephalic and atraumatic. Eyes:      Pupils: Pupils are equal, round, and reactive to light. Cardiovascular:      Rate and Rhythm: Normal rate and regular rhythm.    Pulmonary:      Effort: Pulmonary effort is normal. No respiratory distress. Breath sounds: Normal breath sounds. No wheezing or rales. Chest:      Chest wall: No tenderness. Abdominal:      General: Bowel sounds are normal. There is no distension. Palpations: Abdomen is soft. Tenderness: There is no abdominal tenderness. There is no guarding. Skin:     General: Skin is warm and dry. Neurological:      Mental Status: He is alert and oriented to person, place, and time. Coordination: Coordination normal.   Psychiatric:         Mood and Affect: Mood normal.       Assessment and Plan:  Ziyad Sellers was seen today for annual exam.  Diagnoses and all orders for this visit:    Annual physical exam   - BMP stable 5/22/22. Severe episode of recurrent major depressive disorder, without psychotic features (HCC)/Anxiety  -     Last PHQ-9 was 4. Today's PHQ-9 is 10. Denies suicidal or homicidal ideation.  - Pt states he is doing well on the current regimen. Reviewed PHQ9 results with the pt. Pt states work is stressful this past week, but overall his mood has done well on this regimen. Denies side effects. Pt would like to stay on this regimen.  - Continue current regimen  - sertraline (ZOLOFT) 25 MG tablet; Take 1 tablet by mouth daily-refilled today  -     TSH with Reflex to FT4; Future  - Pt will call if symptoms worsen or fail to improve    Platelets decreased ([address]  -     Found on lab review. - CBC showed low plts 5/22/22. Asymptomatic. Denies abnormal bleeding/bruising.  - Will repeat. - CBC with Auto Differential; Future    Smoker   - Cessation recommended    Abdominal spasms  -    Well controlled on the current regimen without side effects  - Continue current regimen   - famotidine (PEPCID) 20 MG tablet; TAKE 1 TABLET BY MOUTH EVERY MORNING BEFORE BREAKFAST AS NEEDED FOR GERD  -     dicyclomine (BENTYL) 20 MG tablet;  Take 1 tablet by mouth 3 times daily as needed (abdominal spasms)    Need for hepatitis C screening test  -    Asymptomatic. Patient agreeable  - Hepatitis C Antibody; Future    Vaccine counseling   - CDC guidelines regarding vaccines reviewed with the patient. Return in about 6 months (around 12/28/2022) for mood f/u, or sooner if needed. Pt will call if symptoms worsen or fail to improve. All questions answered. Pt states no further questions or concerns at this time.    Electronically signed by: RADHA Pina CNP 06/28/22

## 2022-07-06 DIAGNOSIS — F33.2 SEVERE EPISODE OF RECURRENT MAJOR DEPRESSIVE DISORDER, WITHOUT PSYCHOTIC FEATURES (HCC): ICD-10-CM

## 2022-07-06 RX ORDER — SERTRALINE HYDROCHLORIDE 25 MG/1
25 TABLET, FILM COATED ORAL DAILY
Qty: 14 TABLET | OUTPATIENT
Start: 2022-07-06

## 2022-07-13 DIAGNOSIS — Z11.59 NEED FOR HEPATITIS C SCREENING TEST: ICD-10-CM

## 2022-07-13 DIAGNOSIS — D69.6 PLATELETS DECREASED (HCC): ICD-10-CM

## 2022-07-13 DIAGNOSIS — F41.9 ANXIETY: ICD-10-CM

## 2022-07-13 DIAGNOSIS — F33.2 SEVERE EPISODE OF RECURRENT MAJOR DEPRESSIVE DISORDER, WITHOUT PSYCHOTIC FEATURES (HCC): ICD-10-CM

## 2022-07-13 LAB
BASOPHILS ABSOLUTE: 0 K/UL (ref 0–0.2)
BASOPHILS RELATIVE PERCENT: 0.5 %
EOSINOPHILS ABSOLUTE: 0.1 K/UL (ref 0–0.6)
EOSINOPHILS RELATIVE PERCENT: 1.5 %
HCT VFR BLD CALC: 43.9 % (ref 40.5–52.5)
HEMOGLOBIN: 15.1 G/DL (ref 13.5–17.5)
HEPATITIS C ANTIBODY INTERPRETATION: NORMAL
LYMPHOCYTES ABSOLUTE: 2.1 K/UL (ref 1–5.1)
LYMPHOCYTES RELATIVE PERCENT: 35.2 %
MCH RBC QN AUTO: 30.2 PG (ref 26–34)
MCHC RBC AUTO-ENTMCNC: 34.5 G/DL (ref 31–36)
MCV RBC AUTO: 87.4 FL (ref 80–100)
MONOCYTES ABSOLUTE: 0.3 K/UL (ref 0–1.3)
MONOCYTES RELATIVE PERCENT: 5.9 %
NEUTROPHILS ABSOLUTE: 3.3 K/UL (ref 1.7–7.7)
NEUTROPHILS RELATIVE PERCENT: 56.9 %
PDW BLD-RTO: 13.5 % (ref 12.4–15.4)
PLATELET # BLD: 165 K/UL (ref 135–450)
PMV BLD AUTO: 8.2 FL (ref 5–10.5)
RBC # BLD: 5.02 M/UL (ref 4.2–5.9)
TSH REFLEX FT4: 1.13 UIU/ML (ref 0.27–4.2)
WBC # BLD: 5.8 K/UL (ref 4–11)

## 2023-07-05 ENCOUNTER — TELEMEDICINE (OUTPATIENT)
Dept: INTERNAL MEDICINE CLINIC | Age: 23
End: 2023-07-05
Payer: COMMERCIAL

## 2023-07-05 DIAGNOSIS — F41.9 ANXIETY: ICD-10-CM

## 2023-07-05 DIAGNOSIS — R10.9 ABDOMINAL SPASMS: Primary | ICD-10-CM

## 2023-07-05 DIAGNOSIS — F33.2 SEVERE EPISODE OF RECURRENT MAJOR DEPRESSIVE DISORDER, WITHOUT PSYCHOTIC FEATURES (HCC): ICD-10-CM

## 2023-07-05 DIAGNOSIS — F17.200 SMOKER: ICD-10-CM

## 2023-07-05 PROCEDURE — G8427 DOCREV CUR MEDS BY ELIG CLIN: HCPCS | Performed by: NURSE PRACTITIONER

## 2023-07-05 PROCEDURE — 99214 OFFICE O/P EST MOD 30 MIN: CPT | Performed by: NURSE PRACTITIONER

## 2023-07-05 RX ORDER — SERTRALINE HYDROCHLORIDE 25 MG/1
25 TABLET, FILM COATED ORAL DAILY
Qty: 30 TABLET | Refills: 1 | Status: SHIPPED | OUTPATIENT
Start: 2023-07-05

## 2023-07-05 RX ORDER — FAMOTIDINE 20 MG/1
TABLET, FILM COATED ORAL
Qty: 90 TABLET | Refills: 0 | Status: SHIPPED | OUTPATIENT
Start: 2023-07-05

## 2023-07-05 RX ORDER — DICYCLOMINE HCL 20 MG
20 TABLET ORAL 3 TIMES DAILY PRN
Qty: 90 TABLET | Refills: 0 | Status: SHIPPED | OUTPATIENT
Start: 2023-07-05

## 2023-07-05 SDOH — ECONOMIC STABILITY: HOUSING INSECURITY
IN THE LAST 12 MONTHS, WAS THERE A TIME WHEN YOU DID NOT HAVE A STEADY PLACE TO SLEEP OR SLEPT IN A SHELTER (INCLUDING NOW)?: NO

## 2023-07-05 SDOH — ECONOMIC STABILITY: FOOD INSECURITY: WITHIN THE PAST 12 MONTHS, YOU WORRIED THAT YOUR FOOD WOULD RUN OUT BEFORE YOU GOT MONEY TO BUY MORE.: NEVER TRUE

## 2023-07-05 SDOH — ECONOMIC STABILITY: TRANSPORTATION INSECURITY
IN THE PAST 12 MONTHS, HAS LACK OF TRANSPORTATION KEPT YOU FROM MEETINGS, WORK, OR FROM GETTING THINGS NEEDED FOR DAILY LIVING?: NO

## 2023-07-05 SDOH — ECONOMIC STABILITY: INCOME INSECURITY: HOW HARD IS IT FOR YOU TO PAY FOR THE VERY BASICS LIKE FOOD, HOUSING, MEDICAL CARE, AND HEATING?: NOT HARD AT ALL

## 2023-07-05 SDOH — ECONOMIC STABILITY: FOOD INSECURITY: WITHIN THE PAST 12 MONTHS, THE FOOD YOU BOUGHT JUST DIDN'T LAST AND YOU DIDN'T HAVE MONEY TO GET MORE.: NEVER TRUE

## 2023-07-05 ASSESSMENT — ENCOUNTER SYMPTOMS
SHORTNESS OF BREATH: 0
ABDOMINAL PAIN: 0
CONSTIPATION: 0
NAUSEA: 0
COUGH: 0
VOMITING: 0
WHEEZING: 0
DIARRHEA: 0

## 2023-07-05 ASSESSMENT — PATIENT HEALTH QUESTIONNAIRE - PHQ9
5. POOR APPETITE OR OVEREATING: 0
10. IF YOU CHECKED OFF ANY PROBLEMS, HOW DIFFICULT HAVE THESE PROBLEMS MADE IT FOR YOU TO DO YOUR WORK, TAKE CARE OF THINGS AT HOME, OR GET ALONG WITH OTHER PEOPLE: 2
SUM OF ALL RESPONSES TO PHQ QUESTIONS 1-9: 9
7. TROUBLE CONCENTRATING ON THINGS, SUCH AS READING THE NEWSPAPER OR WATCHING TELEVISION: 0
3. TROUBLE FALLING OR STAYING ASLEEP: 3
SUM OF ALL RESPONSES TO PHQ QUESTIONS 1-9: 10
6. FEELING BAD ABOUT YOURSELF - OR THAT YOU ARE A FAILURE OR HAVE LET YOURSELF OR YOUR FAMILY DOWN: 0
SUM OF ALL RESPONSES TO PHQ QUESTIONS 1-9: 10
1. LITTLE INTEREST OR PLEASURE IN DOING THINGS: 1
4. FEELING TIRED OR HAVING LITTLE ENERGY: 3
9. THOUGHTS THAT YOU WOULD BE BETTER OFF DEAD, OR OF HURTING YOURSELF: 1
SUM OF ALL RESPONSES TO PHQ9 QUESTIONS 1 & 2: 2
SUM OF ALL RESPONSES TO PHQ QUESTIONS 1-9: 10
8. MOVING OR SPEAKING SO SLOWLY THAT OTHER PEOPLE COULD HAVE NOTICED. OR THE OPPOSITE, BEING SO FIGETY OR RESTLESS THAT YOU HAVE BEEN MOVING AROUND A LOT MORE THAN USUAL: 1
2. FEELING DOWN, DEPRESSED OR HOPELESS: 1

## 2023-07-05 ASSESSMENT — COLUMBIA-SUICIDE SEVERITY RATING SCALE - C-SSRS
1. WITHIN THE PAST MONTH, HAVE YOU WISHED YOU WERE DEAD OR WISHED YOU COULD GO TO SLEEP AND NOT WAKE UP?: NO
6. HAVE YOU EVER DONE ANYTHING, STARTED TO DO ANYTHING, OR PREPARED TO DO ANYTHING TO END YOUR LIFE?: NO
2. HAVE YOU ACTUALLY HAD ANY THOUGHTS OF KILLING YOURSELF?: NO

## 2023-07-05 NOTE — PROGRESS NOTES
2023  TELEHEALTH EVALUATION -- Audio/Visual    Subjective:  Chief Complaint   Patient presents with    Follow-up    Depression     HPI:   Marcella Gunn (:  2000) has requested an audio/video evaluation for the following concern(s):    Abdominal spasms- Pepcid daily was prescribed with as needed Bentyl (uses 1-2 days a week PRN). Ran out of this medication and was lost to f/u. States that spasms are occurring more often without this medication. Denies N/V/D/C. Denies blood in stool. Anxiety/depression- Prescribed zoloft 25 mg PO daily. Ran out of the medication and was lost to f/u. Pt reports that his mood is down and he is not sleeping as well. Felt better when taking zoloft. Denies anxiety attacks. Denies SI/HI. Smoker- no longer using cigars. Using the vape- Not ready to quit. States he has moved twice since our last visit. 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 Review of Systems   Constitutional:  Positive for fatigue. Negative for chills, fever and unexpected weight change. Eyes:  Negative for visual disturbance. Respiratory:  Negative for cough, shortness of breath and wheezing. Cardiovascular:  Negative for chest pain, palpitations and leg swelling. Gastrointestinal:  Negative for abdominal pain, constipation, diarrhea, nausea and vomiting. Skin:  Negative for pallor and rash. Neurological:  Negative for dizziness, weakness, light-headedness, numbness and headaches. Psychiatric/Behavioral:  Positive for dysphoric mood. Negative for self-injury, sleep disturbance and suicidal ideas. The patient is not nervous/anxious.       No Known Allergies    Current Outpatient Rx   Medication Sig Dispense Refill    sertraline (ZOLOFT) 25 MG tablet Take 1 tablet by mouth daily 30 tablet 1    famotidine (PEPCID) 20 MG tablet TAKE 1 TABLET BY MOUTH EVERY MORNING BEFORE BREAKFAST AS NEEDED FOR GERD 90 tablet 0    dicyclomine (BENTYL) 20 MG tablet Take 1 tablet by mouth 3

## 2023-08-08 ENCOUNTER — OFFICE VISIT (OUTPATIENT)
Dept: INTERNAL MEDICINE CLINIC | Age: 23
End: 2023-08-08
Payer: COMMERCIAL

## 2023-08-08 VITALS
OXYGEN SATURATION: 99 % | DIASTOLIC BLOOD PRESSURE: 64 MMHG | SYSTOLIC BLOOD PRESSURE: 106 MMHG | WEIGHT: 200.6 LBS | HEART RATE: 87 BPM | HEIGHT: 71 IN | BODY MASS INDEX: 28.08 KG/M2

## 2023-08-08 DIAGNOSIS — F41.9 ANXIETY: ICD-10-CM

## 2023-08-08 DIAGNOSIS — Z00.00 ANNUAL PHYSICAL EXAM: Primary | ICD-10-CM

## 2023-08-08 DIAGNOSIS — F33.2 SEVERE EPISODE OF RECURRENT MAJOR DEPRESSIVE DISORDER, WITHOUT PSYCHOTIC FEATURES (HCC): ICD-10-CM

## 2023-08-08 DIAGNOSIS — Z13.220 SCREENING, LIPID: ICD-10-CM

## 2023-08-08 DIAGNOSIS — R10.9 ABDOMINAL SPASMS: ICD-10-CM

## 2023-08-08 DIAGNOSIS — F17.200 SMOKER: ICD-10-CM

## 2023-08-08 PROCEDURE — 99395 PREV VISIT EST AGE 18-39: CPT | Performed by: NURSE PRACTITIONER

## 2023-08-08 RX ORDER — FAMOTIDINE 20 MG/1
TABLET, FILM COATED ORAL
Qty: 90 TABLET | Refills: 0 | Status: SHIPPED | OUTPATIENT
Start: 2023-08-08

## 2023-08-08 RX ORDER — DICYCLOMINE HCL 20 MG
20 TABLET ORAL 3 TIMES DAILY PRN
Qty: 90 TABLET | Refills: 2 | Status: SHIPPED | OUTPATIENT
Start: 2023-08-08

## 2023-08-08 RX ORDER — SERTRALINE HYDROCHLORIDE 25 MG/1
25 TABLET, FILM COATED ORAL DAILY
Qty: 90 TABLET | Refills: 0 | Status: SHIPPED | OUTPATIENT
Start: 2023-08-08

## 2023-08-08 ASSESSMENT — PATIENT HEALTH QUESTIONNAIRE - PHQ9
4. FEELING TIRED OR HAVING LITTLE ENERGY: 1
2. FEELING DOWN, DEPRESSED OR HOPELESS: 0
9. THOUGHTS THAT YOU WOULD BE BETTER OFF DEAD, OR OF HURTING YOURSELF: 0
7. TROUBLE CONCENTRATING ON THINGS, SUCH AS READING THE NEWSPAPER OR WATCHING TELEVISION: 0
SUM OF ALL RESPONSES TO PHQ QUESTIONS 1-9: 3
5. POOR APPETITE OR OVEREATING: 0
SUM OF ALL RESPONSES TO PHQ QUESTIONS 1-9: 3
SUM OF ALL RESPONSES TO PHQ QUESTIONS 1-9: 3
1. LITTLE INTEREST OR PLEASURE IN DOING THINGS: 1
SUM OF ALL RESPONSES TO PHQ QUESTIONS 1-9: 3
10. IF YOU CHECKED OFF ANY PROBLEMS, HOW DIFFICULT HAVE THESE PROBLEMS MADE IT FOR YOU TO DO YOUR WORK, TAKE CARE OF THINGS AT HOME, OR GET ALONG WITH OTHER PEOPLE: 0
SUM OF ALL RESPONSES TO PHQ9 QUESTIONS 1 & 2: 1
3. TROUBLE FALLING OR STAYING ASLEEP: 1
8. MOVING OR SPEAKING SO SLOWLY THAT OTHER PEOPLE COULD HAVE NOTICED. OR THE OPPOSITE, BEING SO FIGETY OR RESTLESS THAT YOU HAVE BEEN MOVING AROUND A LOT MORE THAN USUAL: 0
6. FEELING BAD ABOUT YOURSELF - OR THAT YOU ARE A FAILURE OR HAVE LET YOURSELF OR YOUR FAMILY DOWN: 0

## 2023-08-08 ASSESSMENT — ENCOUNTER SYMPTOMS
CONSTIPATION: 0
NAUSEA: 0
SORE THROAT: 0
SHORTNESS OF BREATH: 0
ABDOMINAL PAIN: 0
VOMITING: 0
DIARRHEA: 0
WHEEZING: 0
COUGH: 0
SINUS PAIN: 0

## 2023-08-08 NOTE — PATIENT INSTRUCTIONS
Please get your fasting lab work (no food or drink for 10-12 hours prior besides water) completed [id number] [id number] at our office. [facility] lab has walk-in hours available as well - no appointment is needed. We will call or mychart message you with your results.

## (undated) DEVICE — NEEDLE SPNL 22GA L3.5IN BLK HUB S STL REG WALL FIT STYL W/

## (undated) DEVICE — SUTURE MCRYL SZ 4-0 L18IN ABSRB UD L19MM PS-2 3/8 CIR PRIM Y496G

## (undated) DEVICE — TOOL 14MH30 LEGEND 14CM 3MM: Brand: MIDAS REX ™

## (undated) DEVICE — Device: Brand: JELCO

## (undated) DEVICE — Device

## (undated) DEVICE — SET EXTN L7IN PRIMING VOL 0.5ML PRSS RATE STD BOR 1 REM

## (undated) DEVICE — COVER LT HNDL BLU PLAS

## (undated) DEVICE — SOLUTION IV IRRIG 500ML 0.9% SODIUM CHL 2F7123

## (undated) DEVICE — STERILE POLYISOPRENE POWDER-FREE SURGICAL GLOVES: Brand: PROTEXIS

## (undated) DEVICE — ELECTRODE PT RET AD L9FT HI MOIST COND ADH HYDRGEL CORDED

## (undated) DEVICE — NEEDLE EPI 22GA L2IN CLR HUB N WNG PERIFIX TUOHY

## (undated) DEVICE — MEDIA CONTRAST RX ISOVUE-300 61% 30ML VIALS

## (undated) DEVICE — SYRINGE MED 3ML CLR PLAS STD N CTRL LUERLOCK TIP DISP

## (undated) DEVICE — KIT JACK TBL PT CARE

## (undated) DEVICE — GLOVE ORANGE PI 8   MSG9080

## (undated) DEVICE — MICRO KOVER: Brand: UNBRANDED

## (undated) DEVICE — GLOVE,SURG,SENSICARE SLT,LF,PF,6: Brand: MEDLINE

## (undated) DEVICE — SYRINGE MED 30ML STD CLR PLAS LUERLOCK TIP N CTRL DISP

## (undated) DEVICE — GOWN SIRUS NONREIN LG W/TWL: Brand: MEDLINE INDUSTRIES, INC.

## (undated) DEVICE — SYRINGE MED 10ML LUERLOCK TIP W/O SFTY DISP

## (undated) DEVICE — SUTURE VCRL SZ 0 L27IN ABSRB UD L26MM CT-2 1/2 CIR J270H

## (undated) DEVICE — 3M™ TEGADERM™ TRANSPARENT FILM DRESSING FRAME STYLE, 1626W, 4 IN X 4-3/4 IN (10 CM X 12 CM), 50/CT 4CT/CASE: Brand: 3M™ TEGADERM™

## (undated) DEVICE — TOWEL,OR,DSP,ST,BLUE,STD,4/PK,20PK/CS: Brand: MEDLINE

## (undated) DEVICE — C-ARM: Brand: UNBRANDED

## (undated) DEVICE — LOTION PREP REMV 5OZ IODO CLR TINC OF BENZ DURAPREP

## (undated) DEVICE — UNIVERSAL BLOCK TRAY: Brand: AVANOS*

## (undated) DEVICE — STANDARD HYPODERMIC NEEDLE,POLYPROPYLENE HUB: Brand: MONOJECT

## (undated) DEVICE — PEN: MARKING STD 100/CS: Brand: MEDICAL ACTION INDUSTRIES

## (undated) DEVICE — GLOVE,SURG,SENSICARE SLT,LF,PF,7.5: Brand: MEDLINE

## (undated) DEVICE — CANISTER, RIGID, 1200CC: Brand: MEDLINE INDUSTRIES, INC.

## (undated) DEVICE — CHLORAPREP 26ML ORANGE

## (undated) DEVICE — MERCY HEALTH WEST TURNOVER: Brand: MEDLINE INDUSTRIES, INC.

## (undated) DEVICE — SUTURE VCRL SZ 3-0 L18IN ABSRB UD L26MM SH 1/2 CIR J864D

## (undated) DEVICE — 3M™ IOBAN™ 2 ANTIMICROBIAL INCISE DRAPE 6650EZ: Brand: IOBAN™ 2

## (undated) DEVICE — PORT VLV 2 W NDL FREE SMRTSITE

## (undated) DEVICE — GAUZE,SPONGE,2"X2",8PLY,STERILE,LF,2'S: Brand: MEDLINE

## (undated) DEVICE — PAD N ADH W3XL4IN POLY COT SFT PERF FLM EASILY CUT ABSRB

## (undated) DEVICE — DISPOSABLE OR TOWEL: Brand: CARDINAL HEALTH

## (undated) DEVICE — NEEDLE HYPO 25GA L1.5IN BVL ORIENTED ECLIPSE